# Patient Record
Sex: FEMALE | Race: WHITE | NOT HISPANIC OR LATINO | Employment: OTHER | URBAN - NONMETROPOLITAN AREA
[De-identification: names, ages, dates, MRNs, and addresses within clinical notes are randomized per-mention and may not be internally consistent; named-entity substitution may affect disease eponyms.]

---

## 2023-03-28 PROBLEM — M79.10 MYALGIA: Status: ACTIVE | Noted: 2023-03-28

## 2023-03-28 PROBLEM — M25.542 JOINT PAIN IN FINGERS OF BOTH HANDS: Status: ACTIVE | Noted: 2023-03-28

## 2023-03-28 PROBLEM — J30.9 ALLERGIC RHINITIS: Status: ACTIVE | Noted: 2023-03-28

## 2023-03-28 PROBLEM — M25.572 LEFT ANKLE PAIN: Status: ACTIVE | Noted: 2023-03-28

## 2023-03-28 PROBLEM — M81.0 OSTEOPOROSIS: Status: ACTIVE | Noted: 2023-03-28

## 2023-03-28 PROBLEM — R11.0 NAUSEA IN ADULT: Status: ACTIVE | Noted: 2023-03-28

## 2023-03-28 PROBLEM — E11.40 NEUROPATHY IN DIABETES (MULTI): Status: ACTIVE | Noted: 2023-03-28

## 2023-03-28 PROBLEM — M85.80 OSTEOPENIA: Status: ACTIVE | Noted: 2023-03-28

## 2023-03-28 PROBLEM — M19.019 ARTHRITIS OF SHOULDER: Status: ACTIVE | Noted: 2023-03-28

## 2023-03-28 PROBLEM — M79.605 PAIN OF LEFT LOWER EXTREMITY: Status: ACTIVE | Noted: 2023-03-28

## 2023-03-28 PROBLEM — M17.12 ARTHRITIS OF KNEE, LEFT: Status: ACTIVE | Noted: 2023-03-28

## 2023-03-28 PROBLEM — M54.16 LUMBAR RADICULITIS: Status: ACTIVE | Noted: 2023-03-28

## 2023-03-28 PROBLEM — E78.2 MIXED HYPERLIPIDEMIA: Status: ACTIVE | Noted: 2023-03-28

## 2023-03-28 PROBLEM — M54.50 BACK PAIN, LUMBOSACRAL: Status: ACTIVE | Noted: 2023-03-28

## 2023-03-28 PROBLEM — G56.03 BILATERAL CARPAL TUNNEL SYNDROME: Status: ACTIVE | Noted: 2023-03-28

## 2023-03-28 PROBLEM — E11.65 TYPE 2 DIABETES MELLITUS WITH HYPERGLYCEMIA, WITH LONG-TERM CURRENT USE OF INSULIN (MULTI): Status: ACTIVE | Noted: 2023-03-28

## 2023-03-28 PROBLEM — E11.9 DIABETES MELLITUS (MULTI): Status: ACTIVE | Noted: 2023-03-28

## 2023-03-28 PROBLEM — E03.9 HYPOTHYROIDISM, ADULT: Status: ACTIVE | Noted: 2023-03-28

## 2023-03-28 PROBLEM — E04.2 NONTOXIC MULTINODULAR GOITER: Status: ACTIVE | Noted: 2023-03-28

## 2023-03-28 PROBLEM — M50.20 CERVICAL DISC HERNIATION: Status: ACTIVE | Noted: 2023-03-28

## 2023-03-28 PROBLEM — R25.9 INVOLUNTARY MOVEMENTS: Status: ACTIVE | Noted: 2023-03-28

## 2023-03-28 PROBLEM — M75.51 BURSITIS OF BOTH SHOULDERS: Status: ACTIVE | Noted: 2023-03-28

## 2023-03-28 PROBLEM — M79.89 LEG SWELLING: Status: ACTIVE | Noted: 2023-03-28

## 2023-03-28 PROBLEM — M47.817 LUMBOSACRAL SPONDYLOSIS: Status: ACTIVE | Noted: 2023-03-28

## 2023-03-28 PROBLEM — R51.9 HEADACHE: Status: ACTIVE | Noted: 2023-03-28

## 2023-03-28 PROBLEM — M19.041 ARTHRITIS OF BOTH HANDS: Status: ACTIVE | Noted: 2023-03-28

## 2023-03-28 PROBLEM — F32.A DEPRESSION: Status: ACTIVE | Noted: 2023-03-28

## 2023-03-28 PROBLEM — I10 HTN (HYPERTENSION): Status: ACTIVE | Noted: 2023-03-28

## 2023-03-28 PROBLEM — M79.602 PAIN IN BOTH UPPER EXTREMITIES: Status: ACTIVE | Noted: 2023-03-28

## 2023-03-28 PROBLEM — H54.7 BLINDNESS: Status: ACTIVE | Noted: 2023-03-28

## 2023-03-28 PROBLEM — I10: Status: ACTIVE | Noted: 2023-03-28

## 2023-03-28 PROBLEM — M25.562 LEFT KNEE PAIN: Status: ACTIVE | Noted: 2023-03-28

## 2023-03-28 PROBLEM — M48.061 LUMBAR SPINAL STENOSIS: Status: ACTIVE | Noted: 2023-03-28

## 2023-03-28 PROBLEM — M50.10 CERVICAL DISC DISORDER WITH RADICULOPATHY: Status: ACTIVE | Noted: 2023-03-28

## 2023-03-28 PROBLEM — E66.812 CLASS 2 SEVERE OBESITY WITH SERIOUS COMORBIDITY AND BODY MASS INDEX (BMI) OF 36.0 TO 36.9 IN ADULT: Status: ACTIVE | Noted: 2023-03-28

## 2023-03-28 PROBLEM — F32.5 DEPRESSION, MAJOR, IN REMISSION (CMS-HCC): Status: ACTIVE | Noted: 2023-03-28

## 2023-03-28 PROBLEM — R06.83 PRIMARY SNORING: Status: ACTIVE | Noted: 2023-03-28

## 2023-03-28 PROBLEM — M25.541 JOINT PAIN IN FINGERS OF BOTH HANDS: Status: ACTIVE | Noted: 2023-03-28

## 2023-03-28 PROBLEM — H35.30 MACULAR DEGENERATION: Status: ACTIVE | Noted: 2023-03-28

## 2023-03-28 PROBLEM — M75.52 BURSITIS OF BOTH SHOULDERS: Status: ACTIVE | Noted: 2023-03-28

## 2023-03-28 PROBLEM — M79.89 RIGHT LEG SWELLING: Status: ACTIVE | Noted: 2023-03-28

## 2023-03-28 PROBLEM — E78.00 HYPERCHOLESTEROLEMIA: Status: ACTIVE | Noted: 2023-03-28

## 2023-03-28 PROBLEM — I26.99 PULMONARY EMBOLISM, BILATERAL (MULTI): Status: ACTIVE | Noted: 2023-03-28

## 2023-03-28 PROBLEM — M54.12 CERVICAL NEURITIS: Status: ACTIVE | Noted: 2023-03-28

## 2023-03-28 PROBLEM — E78.1 HYPERTRIGLYCERIDEMIA: Status: ACTIVE | Noted: 2023-03-28

## 2023-03-28 PROBLEM — M19.042 ARTHRITIS OF BOTH HANDS: Status: ACTIVE | Noted: 2023-03-28

## 2023-03-28 PROBLEM — M25.511 ARTHRALGIA OF RIGHT SHOULDER REGION: Status: ACTIVE | Noted: 2023-03-28

## 2023-03-28 PROBLEM — M54.16 RIGHT LUMBAR RADICULOPATHY: Status: ACTIVE | Noted: 2023-03-28

## 2023-03-28 PROBLEM — R07.89 ATYPICAL CHEST PAIN: Status: ACTIVE | Noted: 2023-03-28

## 2023-03-28 PROBLEM — T14.8XXA MUSCLE STRAIN: Status: ACTIVE | Noted: 2023-03-28

## 2023-03-28 PROBLEM — E16.2 HYPOGLYCEMIA: Status: ACTIVE | Noted: 2023-03-28

## 2023-03-28 PROBLEM — Z79.4 TYPE 2 DIABETES MELLITUS WITH HYPERGLYCEMIA, WITH LONG-TERM CURRENT USE OF INSULIN (MULTI): Status: ACTIVE | Noted: 2023-03-28

## 2023-03-28 PROBLEM — R07.1 CHEST PAIN VARYING WITH BREATHING: Status: ACTIVE | Noted: 2023-03-28

## 2023-03-28 PROBLEM — M25.552 LEFT HIP PAIN: Status: ACTIVE | Noted: 2023-03-28

## 2023-03-28 PROBLEM — M79.601 PAIN IN BOTH UPPER EXTREMITIES: Status: ACTIVE | Noted: 2023-03-28

## 2023-03-28 PROBLEM — R39.9 URINARY SYMPTOM OR SIGN: Status: ACTIVE | Noted: 2023-03-28

## 2023-03-28 PROBLEM — E53.8 VITAMIN B12 DEFICIENCY: Status: ACTIVE | Noted: 2023-03-28

## 2023-03-28 PROBLEM — R26.81 UNSTEADINESS: Status: ACTIVE | Noted: 2023-03-28

## 2023-03-28 PROBLEM — M43.9 WEDGE DEFORMITY ON X-RAY OF SPINE: Status: ACTIVE | Noted: 2023-03-28

## 2023-03-28 PROBLEM — H81.10 BPV (BENIGN POSITIONAL VERTIGO): Status: ACTIVE | Noted: 2023-03-28

## 2023-03-28 PROBLEM — E66.01 CLASS 2 SEVERE OBESITY WITH SERIOUS COMORBIDITY AND BODY MASS INDEX (BMI) OF 36.0 TO 36.9 IN ADULT (MULTI): Status: ACTIVE | Noted: 2023-03-28

## 2023-03-28 PROBLEM — G45.9 TIA (TRANSIENT ISCHEMIC ATTACK): Status: ACTIVE | Noted: 2023-03-28

## 2023-03-28 PROBLEM — K30 STOMACH BURNING: Status: ACTIVE | Noted: 2023-03-28

## 2023-03-28 PROBLEM — E55.9 VITAMIN D DEFICIENCY: Status: ACTIVE | Noted: 2023-03-28

## 2023-03-28 RX ORDER — FOLIC ACID 1 MG/1
1 TABLET ORAL DAILY
COMMUNITY
Start: 2015-03-31 | End: 2023-05-10 | Stop reason: SDUPTHER

## 2023-03-28 RX ORDER — GLIMEPIRIDE 2 MG/1
1 TABLET ORAL 2 TIMES DAILY
COMMUNITY
Start: 2012-07-31 | End: 2023-05-10 | Stop reason: SDUPTHER

## 2023-03-28 RX ORDER — CALCIUM CARB/VITAMIN D3/VIT K1 500-100-40
TABLET,CHEWABLE ORAL DAILY
COMMUNITY
End: 2023-05-10 | Stop reason: SDUPTHER

## 2023-03-28 RX ORDER — VIT C/E/ZN/COPPR/LUTEIN/ZEAXAN 250MG-90MG
CAPSULE ORAL
COMMUNITY
End: 2023-05-10 | Stop reason: SDUPTHER

## 2023-03-28 RX ORDER — LEVOTHYROXINE SODIUM 25 UG/1
1 TABLET ORAL DAILY
COMMUNITY
Start: 2018-02-16 | End: 2023-05-10 | Stop reason: SDUPTHER

## 2023-03-28 RX ORDER — ALENDRONATE SODIUM 70 MG/1
1 TABLET ORAL
COMMUNITY
Start: 2020-09-02 | End: 2023-05-10 | Stop reason: SDUPTHER

## 2023-03-28 RX ORDER — LISINOPRIL 20 MG/1
1 TABLET ORAL DAILY
COMMUNITY
Start: 2020-07-27 | End: 2023-05-10 | Stop reason: SDUPTHER

## 2023-03-28 RX ORDER — DULOXETIN HYDROCHLORIDE 30 MG/1
1 CAPSULE, DELAYED RELEASE ORAL DAILY
COMMUNITY
Start: 2020-02-17 | End: 2023-05-10 | Stop reason: SDUPTHER

## 2023-03-28 RX ORDER — INSULIN GLARGINE 100 [IU]/ML
30 INJECTION, SOLUTION SUBCUTANEOUS NIGHTLY
COMMUNITY
Start: 2018-05-21 | End: 2023-05-10 | Stop reason: SDUPTHER

## 2023-03-28 RX ORDER — ASPIRIN 325 MG
TABLET ORAL
COMMUNITY
Start: 2022-07-11 | End: 2023-05-10 | Stop reason: ALTCHOICE

## 2023-03-28 RX ORDER — BLOOD-GLUCOSE METER
EACH MISCELLANEOUS DAILY
COMMUNITY
Start: 2020-02-17 | End: 2023-11-16 | Stop reason: SDUPTHER

## 2023-03-28 RX ORDER — ROSUVASTATIN CALCIUM 20 MG/1
1 TABLET, COATED ORAL DAILY
COMMUNITY
Start: 2012-10-29 | End: 2023-05-10 | Stop reason: SDUPTHER

## 2023-03-28 RX ORDER — FLUTICASONE PROPIONATE 50 MCG
2 SPRAY, SUSPENSION (ML) NASAL DAILY
COMMUNITY
Start: 2017-01-05 | End: 2023-05-10 | Stop reason: SDUPTHER

## 2023-03-28 RX ORDER — AMLODIPINE BESYLATE 10 MG/1
1 TABLET ORAL DAILY
COMMUNITY
Start: 2016-02-18 | End: 2023-05-10 | Stop reason: SDUPTHER

## 2023-03-28 RX ORDER — INSULIN PUMP SYRINGE, 3 ML
1 EACH MISCELLANEOUS AS NEEDED
COMMUNITY

## 2023-03-28 RX ORDER — OMEPRAZOLE 20 MG/1
20 CAPSULE, DELAYED RELEASE ORAL DAILY
COMMUNITY
End: 2023-05-10 | Stop reason: SDUPTHER

## 2023-03-30 ENCOUNTER — APPOINTMENT (OUTPATIENT)
Dept: PRIMARY CARE | Facility: CLINIC | Age: 88
End: 2023-03-30
Payer: MEDICARE

## 2023-03-30 ENCOUNTER — OFFICE VISIT (OUTPATIENT)
Dept: PRIMARY CARE | Facility: CLINIC | Age: 88
End: 2023-03-30
Payer: MEDICARE

## 2023-03-30 VITALS
TEMPERATURE: 97.4 F | SYSTOLIC BLOOD PRESSURE: 126 MMHG | HEART RATE: 82 BPM | DIASTOLIC BLOOD PRESSURE: 64 MMHG | BODY MASS INDEX: 36.86 KG/M2 | WEIGHT: 164.4 LBS | OXYGEN SATURATION: 98 %

## 2023-03-30 DIAGNOSIS — E11.65 TYPE 2 DIABETES MELLITUS WITH HYPERGLYCEMIA, WITH LONG-TERM CURRENT USE OF INSULIN (MULTI): ICD-10-CM

## 2023-03-30 DIAGNOSIS — F32.5 DEPRESSION, MAJOR, IN REMISSION (CMS-HCC): ICD-10-CM

## 2023-03-30 DIAGNOSIS — E11.9 COMPREHENSIVE DIABETIC FOOT EXAMINATION, TYPE 2 DM, ENCOUNTER FOR (MULTI): Primary | ICD-10-CM

## 2023-03-30 DIAGNOSIS — M46.1 SACROILIITIS, NOT ELSEWHERE CLASSIFIED (CMS-HCC): ICD-10-CM

## 2023-03-30 DIAGNOSIS — E66.01 CLASS 2 SEVERE OBESITY DUE TO EXCESS CALORIES WITH SERIOUS COMORBIDITY AND BODY MASS INDEX (BMI) OF 36.0 TO 36.9 IN ADULT (MULTI): ICD-10-CM

## 2023-03-30 DIAGNOSIS — Z79.4 TYPE 2 DIABETES MELLITUS WITH HYPERGLYCEMIA, WITH LONG-TERM CURRENT USE OF INSULIN (MULTI): ICD-10-CM

## 2023-03-30 DIAGNOSIS — I26.99 PULMONARY EMBOLISM, BILATERAL (MULTI): ICD-10-CM

## 2023-03-30 PROCEDURE — 1036F TOBACCO NON-USER: CPT | Performed by: INTERNAL MEDICINE

## 2023-03-30 PROCEDURE — 99214 OFFICE O/P EST MOD 30 MIN: CPT | Performed by: INTERNAL MEDICINE

## 2023-03-30 PROCEDURE — 3078F DIAST BP <80 MM HG: CPT | Performed by: INTERNAL MEDICINE

## 2023-03-30 PROCEDURE — 1160F RVW MEDS BY RX/DR IN RCRD: CPT | Performed by: INTERNAL MEDICINE

## 2023-03-30 PROCEDURE — 3074F SYST BP LT 130 MM HG: CPT | Performed by: INTERNAL MEDICINE

## 2023-03-30 PROCEDURE — 1159F MED LIST DOCD IN RCRD: CPT | Performed by: INTERNAL MEDICINE

## 2023-03-30 ASSESSMENT — ENCOUNTER SYMPTOMS
WHEEZING: 0
DIARRHEA: 0
BRUISES/BLEEDS EASILY: 0
FATIGUE: 0
BLOOD IN STOOL: 0
DIZZINESS: 0
SORE THROAT: 0
PALPITATIONS: 0
FEVER: 0
DIFFICULTY URINATING: 0
SINUS PAIN: 0
ARTHRALGIAS: 0
ABDOMINAL PAIN: 0
COUGH: 0
UNEXPECTED WEIGHT CHANGE: 0
HEADACHES: 0

## 2023-03-30 NOTE — PROGRESS NOTES
Subjective   Patient ID: Zhanna Perez is a 89 y.o. female who presents for Diabetes (Need rx for diabetic shoes sent to Dr. Link).    Patient comes today for evaluation for diabetic foot exam  Physical exam noted for dry skin weak peripheral pulsations and deformities patient will benefit from diabetic shoes later obtain copy in the chart  - Diabetes not controlled counseled about diabetic control follow-up hemoglobin A1c next appointment continue current medication  - History of bilateral pulmonary embolism need to continue Eliquis indefinitely  - Hypertension controlled continue with current medication  -Hypercholesterolemia continue with current meds and low-fat diet  - Osteoporosis continue with current medication calcium and vitamin D  - History of macular degeneration stable  -Counseled about BMI and weight loss        Diabetes  Pertinent negatives for hypoglycemia include no dizziness or headaches. Pertinent negatives for diabetes include no chest pain and no fatigue.          Review of Systems   Constitutional:  Negative for fatigue, fever and unexpected weight change.   HENT:  Negative for congestion, ear discharge, ear pain, mouth sores, sinus pain and sore throat.    Eyes:  Negative for visual disturbance.   Respiratory:  Negative for cough and wheezing.    Cardiovascular:  Negative for chest pain, palpitations and leg swelling.   Gastrointestinal:  Negative for abdominal pain, blood in stool and diarrhea.   Genitourinary:  Negative for difficulty urinating.   Musculoskeletal:  Negative for arthralgias.   Skin:  Negative for rash.   Neurological:  Negative for dizziness and headaches.   Hematological:  Does not bruise/bleed easily.   Psychiatric/Behavioral:  Negative for behavioral problems.    All other systems reviewed and are negative.      Objective   No results found for: HGBA1C   /64   Pulse 82   Temp 36.3 °C (97.4 °F)   Wt 74.6 kg (164 lb 6.4 oz)   SpO2 98%   BMI 36.86 kg/m²      Physical Exam  Vitals and nursing note reviewed.   Constitutional:       Appearance: Normal appearance.   HENT:      Head: Normocephalic.      Nose: Nose normal.   Eyes:      Conjunctiva/sclera: Conjunctivae normal.      Pupils: Pupils are equal, round, and reactive to light.   Cardiovascular:      Rate and Rhythm: Regular rhythm.      Pulses:           Dorsalis pedis pulses are 1+ on the right side and 1+ on the left side.        Posterior tibial pulses are 1+ on the right side and 1+ on the left side.   Pulmonary:      Effort: Pulmonary effort is normal.      Breath sounds: Normal breath sounds.   Abdominal:      General: Abdomen is flat.      Palpations: Abdomen is soft.   Musculoskeletal:      Cervical back: Neck supple.      Right foot: Decreased range of motion. Deformity and bunion present.      Left foot: Decreased range of motion. Deformity and bunion present.   Feet:      Right foot:      Protective Sensation: 5 sites tested.  5 sites sensed.      Skin integrity: Dry skin present.      Toenail Condition: Right toenails are abnormally thick and ingrown.      Left foot:      Protective Sensation: 5 sites tested.  5 sites sensed.      Skin integrity: Dry skin present.      Toenail Condition: Left toenails are abnormally thick.   Skin:     General: Skin is warm.   Neurological:      General: No focal deficit present.      Mental Status: She is oriented to person, place, and time.   Psychiatric:         Mood and Affect: Mood normal.         Assessment/Plan   Zhanna was seen today for diabetes.  Diagnoses and all orders for this visit:  Comprehensive diabetic foot examination, type 2 DM, encounter for (CMS/[facility]) (Primary)  Sacroiliitis, not elsewhere classified (CMS/[facility])  Pulmonary embolism, bilateral (CMS/[facility])  Depression, major, in remission (CMS/[facility])  Type 2 diabetes mellitus with hyperglycemia, with long-term current use of insulin (CMS/[facility])  Class 2 severe obesity due to excess calories with serious  comorbidity and body mass index (BMI) of 36.0 to 36.9 in adult (CMS/[facility])   Patient comes today for evaluation for diabetic foot exam  Physical exam noted for dry skin weak peripheral pulsations and deformities patient will benefit from diabetic shoes later obtain copy in the chart  - Diabetes not controlled counseled about diabetic control follow-up hemoglobin A1c next appointment continue current medication  - History of bilateral pulmonary embolism need to continue Eliquis indefinitely  - Hypertension controlled continue with current medication  -Hypercholesterolemia continue with current meds and low-fat diet  - Osteoporosis continue with current medication calcium and vitamin D  - History of macular degeneration stable  -Counseled about BMI and weight loss

## 2023-05-10 ENCOUNTER — OFFICE VISIT (OUTPATIENT)
Dept: PRIMARY CARE | Facility: CLINIC | Age: 88
End: 2023-05-10
Payer: MEDICARE

## 2023-05-10 VITALS
BODY MASS INDEX: 38.29 KG/M2 | DIASTOLIC BLOOD PRESSURE: 72 MMHG | SYSTOLIC BLOOD PRESSURE: 138 MMHG | WEIGHT: 170.8 LBS | HEART RATE: 73 BPM | TEMPERATURE: 97.6 F | OXYGEN SATURATION: 99 %

## 2023-05-10 DIAGNOSIS — E55.9 VITAMIN D DEFICIENCY: ICD-10-CM

## 2023-05-10 DIAGNOSIS — F32.A DEPRESSION, UNSPECIFIED DEPRESSION TYPE: ICD-10-CM

## 2023-05-10 DIAGNOSIS — I10 PRIMARY HYPERTENSION: ICD-10-CM

## 2023-05-10 DIAGNOSIS — L03.119 CELLULITIS OF FOOT: ICD-10-CM

## 2023-05-10 DIAGNOSIS — I26.99 PULMONARY EMBOLISM, BILATERAL (MULTI): ICD-10-CM

## 2023-05-10 DIAGNOSIS — E03.9 HYPOTHYROIDISM, ADULT: ICD-10-CM

## 2023-05-10 DIAGNOSIS — Z79.4 TYPE 2 DIABETES MELLITUS WITH HYPERGLYCEMIA, WITH LONG-TERM CURRENT USE OF INSULIN (MULTI): Primary | ICD-10-CM

## 2023-05-10 DIAGNOSIS — M81.0 OSTEOPOROSIS, UNSPECIFIED OSTEOPOROSIS TYPE, UNSPECIFIED PATHOLOGICAL FRACTURE PRESENCE: ICD-10-CM

## 2023-05-10 DIAGNOSIS — E78.00 HYPERCHOLESTEROLEMIA: ICD-10-CM

## 2023-05-10 DIAGNOSIS — E11.65 TYPE 2 DIABETES MELLITUS WITH HYPERGLYCEMIA, WITH LONG-TERM CURRENT USE OF INSULIN (MULTI): Primary | ICD-10-CM

## 2023-05-10 DIAGNOSIS — E66.01 CLASS 2 SEVERE OBESITY WITH SERIOUS COMORBIDITY AND BODY MASS INDEX (BMI) OF 38.0 TO 38.9 IN ADULT, UNSPECIFIED OBESITY TYPE (MULTI): ICD-10-CM

## 2023-05-10 DIAGNOSIS — K21.9 GASTROESOPHAGEAL REFLUX DISEASE, UNSPECIFIED WHETHER ESOPHAGITIS PRESENT: ICD-10-CM

## 2023-05-10 DIAGNOSIS — T78.40XD ALLERGY, SUBSEQUENT ENCOUNTER: ICD-10-CM

## 2023-05-10 PROCEDURE — 99214 OFFICE O/P EST MOD 30 MIN: CPT | Performed by: NURSE PRACTITIONER

## 2023-05-10 PROCEDURE — 1036F TOBACCO NON-USER: CPT | Performed by: NURSE PRACTITIONER

## 2023-05-10 PROCEDURE — 3075F SYST BP GE 130 - 139MM HG: CPT | Performed by: NURSE PRACTITIONER

## 2023-05-10 PROCEDURE — 1159F MED LIST DOCD IN RCRD: CPT | Performed by: NURSE PRACTITIONER

## 2023-05-10 PROCEDURE — 3078F DIAST BP <80 MM HG: CPT | Performed by: NURSE PRACTITIONER

## 2023-05-10 PROCEDURE — 1160F RVW MEDS BY RX/DR IN RCRD: CPT | Performed by: NURSE PRACTITIONER

## 2023-05-10 RX ORDER — CALCIUM CARB/VITAMIN D3/VIT K1 500-100-40
1 TABLET,CHEWABLE ORAL DAILY
Qty: 90 EACH | Refills: 0 | Status: SHIPPED | OUTPATIENT
Start: 2023-05-10 | End: 2023-11-16 | Stop reason: WASHOUT

## 2023-05-10 RX ORDER — ALENDRONATE SODIUM 70 MG/1
70 TABLET ORAL
Qty: 12 TABLET | Refills: 0 | Status: SHIPPED | OUTPATIENT
Start: 2023-05-10 | End: 2023-08-10 | Stop reason: SDUPTHER

## 2023-05-10 RX ORDER — LISINOPRIL 20 MG/1
20 TABLET ORAL DAILY
Qty: 90 TABLET | Refills: 0 | Status: SHIPPED | OUTPATIENT
Start: 2023-05-10 | End: 2023-07-27 | Stop reason: SDUPTHER

## 2023-05-10 RX ORDER — FOLIC ACID 1 MG/1
1 TABLET ORAL DAILY
Qty: 90 TABLET | Refills: 0 | Status: SHIPPED | OUTPATIENT
Start: 2023-05-10 | End: 2023-08-10 | Stop reason: SDUPTHER

## 2023-05-10 RX ORDER — GLIMEPIRIDE 2 MG/1
2 TABLET ORAL 2 TIMES DAILY
Qty: 180 TABLET | Refills: 0 | Status: SHIPPED | OUTPATIENT
Start: 2023-05-10 | End: 2023-08-10 | Stop reason: SDUPTHER

## 2023-05-10 RX ORDER — OMEPRAZOLE 20 MG/1
20 CAPSULE, DELAYED RELEASE ORAL
Qty: 90 CAPSULE | Refills: 0 | Status: SHIPPED | OUTPATIENT
Start: 2023-05-10 | End: 2023-08-10 | Stop reason: SDUPTHER

## 2023-05-10 RX ORDER — AMLODIPINE BESYLATE 10 MG/1
10 TABLET ORAL DAILY
Qty: 90 TABLET | Refills: 0 | Status: SHIPPED | OUTPATIENT
Start: 2023-05-10 | End: 2023-06-30 | Stop reason: SDUPTHER

## 2023-05-10 RX ORDER — INSULIN GLARGINE 100 [IU]/ML
30 INJECTION, SOLUTION SUBCUTANEOUS NIGHTLY
Qty: 30 ML | Refills: 0 | Status: SHIPPED | OUTPATIENT
Start: 2023-05-10 | End: 2023-08-10 | Stop reason: SDUPTHER

## 2023-05-10 RX ORDER — LEVOTHYROXINE SODIUM 25 UG/1
25 TABLET ORAL DAILY
Qty: 90 TABLET | Refills: 0 | Status: SHIPPED | OUTPATIENT
Start: 2023-05-10 | End: 2023-08-10 | Stop reason: SDUPTHER

## 2023-05-10 RX ORDER — DULOXETIN HYDROCHLORIDE 30 MG/1
30 CAPSULE, DELAYED RELEASE ORAL DAILY
Qty: 90 CAPSULE | Refills: 0 | Status: SHIPPED | OUTPATIENT
Start: 2023-05-10 | End: 2023-08-10 | Stop reason: SDUPTHER

## 2023-05-10 RX ORDER — ROSUVASTATIN CALCIUM 20 MG/1
20 TABLET, COATED ORAL DAILY
Qty: 90 TABLET | Refills: 0 | Status: SHIPPED | OUTPATIENT
Start: 2023-05-10 | End: 2023-08-10 | Stop reason: SDUPTHER

## 2023-05-10 RX ORDER — DOXYCYCLINE 100 MG/1
100 TABLET ORAL 2 TIMES DAILY
Qty: 20 TABLET | Refills: 0 | Status: SHIPPED | OUTPATIENT
Start: 2023-05-10 | End: 2023-05-20

## 2023-05-10 RX ORDER — FLUTICASONE PROPIONATE 50 MCG
2 SPRAY, SUSPENSION (ML) NASAL DAILY
Qty: 16 G | Refills: 0 | Status: SHIPPED | OUTPATIENT
Start: 2023-05-10 | End: 2023-05-16

## 2023-05-10 RX ORDER — VIT C/E/ZN/COPPR/LUTEIN/ZEAXAN 250MG-90MG
25 CAPSULE ORAL DAILY
Qty: 90 CAPSULE | Refills: 0 | Status: SHIPPED | OUTPATIENT
Start: 2023-05-10 | End: 2023-11-16 | Stop reason: SDUPTHER

## 2023-05-10 ASSESSMENT — ENCOUNTER SYMPTOMS
PSYCHIATRIC NEGATIVE: 1
HEMATOLOGIC/LYMPHATIC NEGATIVE: 1
HEADACHES: 0
FATIGUE: 0
WOUND: 1
NAUSEA: 0
ENDOCRINE NEGATIVE: 1
WHEEZING: 0
COUGH: 0
CHEST TIGHTNESS: 0
CONSTIPATION: 0
SHORTNESS OF BREATH: 0
ABDOMINAL DISTENTION: 0
NUMBNESS: 0
ARTHRALGIAS: 1
VOMITING: 0
CHILLS: 0
DIZZINESS: 0
LIGHT-HEADEDNESS: 0
RHINORRHEA: 0
WEAKNESS: 0
DIARRHEA: 0
ACTIVITY CHANGE: 0
SORE THROAT: 0
ALLERGIC/IMMUNOLOGIC NEGATIVE: 1
PALPITATIONS: 0
FEVER: 0
SINUS PRESSURE: 0
ABDOMINAL PAIN: 0
SINUS PAIN: 0

## 2023-05-10 NOTE — ASSESSMENT & PLAN NOTE
-Avoid sweets and sugary drinks  -Drink plenty of water  -Avoid processed foods and refined foods  -Eat fruits, vegetables, lean protein, whole grains  -Increase your activity level

## 2023-05-10 NOTE — ASSESSMENT & PLAN NOTE
Continue current meds  -Low flat/cholesterol, low sodium, carbohydrate controlled diet  -Exercise as tolerated 150 minutes/week, gradually increase activity  -Weight loss  -Regular follow-up with ophthalmology and podiatry

## 2023-05-10 NOTE — PROGRESS NOTES
Subjective   Patient ID: Zhanna Perez is a 89 y.o. female who presents for Hypertension, Hyperlipidemia, Diabetes, and Foot Injury (Left foot pain, dropped boiling water 1 week ago, second toe severe pain ).    Burned dorsal left foot 1-1/2 weeks ago with boiling water. Had blister. Blister is healing but there is some surrounding cellulitis. Start doxycycline. Upcoming appointment with podiatry, Dr. Do.  Diabetes, not controlled, improving A1c 8.3%, taking Lantus to 30 units at bedtime, glimepiride 4 mg twice a day. Macular degeneration, she is having trouble seeing to prick her finger and check her blood sugar. She also has bilateral hand arthritis which makes it difficult for her to manipulate the lancets, meter, and test strips. Needs CGM with a large display in order for her to check her sugar daily without pricking her finger. She occasionally has low blood sugars and needs to be able to check at that time. Continue to check blood sugar daily and bring record to next visit.   GERD, improved with omeprazole 20 mg every morning.   Bilateral pulmonary emboli in August 2021. Patient saw hematology, further testing for coagulopathy was done. Hematology does not believe she has thrombophilia. Believes PE was provoked related to spinal stenosis and decreased mobility. Recommends staying on Eliquis long-term due to her risk of recurrent immobility. Hematology cleared her for spinal injections with holding Eliquis for 3 days prior to the procedure and resuming the day after.   Lumbar spondylosis, lumbar radiculitis. Was following with pain management, Dr. Banks. Has not seen a new provider since he left.  Hypertension, controlled, continue amlodipine 10 mg daily, lisinopril 20 mg daily.  Osteoporosis, taking alendronate 70 mg weekly. Taking Os-João daily. Vitamin D deficiency taking vitamin D 25 mcg daily.  Dyslipidemia, taking rosuvastatin 20 mg daily.  Levothyroxine, controlled, taking levothyroxine 25 mcg  daily.  Taking Cymbalta 30 mg daily for arthritis and depression, controlled    Preventive:  CRC screen: Aged out  Mammogram: 10/2022 negative  DEXA scan: 11/2022 osteoporosis   Ophthalmology: [facility]  Podiatry: [facility]  Urine albumin: 10/2022             Review of Systems   Constitutional:  Negative for activity change, chills, fatigue and fever.   HENT:  Negative for congestion, rhinorrhea, sinus pressure, sinus pain and sore throat.    Eyes:  Positive for visual disturbance.   Respiratory:  Negative for cough, chest tightness, shortness of breath and wheezing.    Cardiovascular:  Negative for chest pain, palpitations and leg swelling.   Gastrointestinal:  Negative for abdominal distention, abdominal pain, constipation, diarrhea, nausea and vomiting.   Endocrine: Negative.    Genitourinary: Negative.    Musculoskeletal:  Positive for arthralgias.   Skin:  Positive for wound.   Allergic/Immunologic: Negative.    Neurological:  Negative for dizziness, syncope, weakness, light-headedness, numbness and headaches.   Hematological: Negative.    Psychiatric/Behavioral: Negative.     All other systems reviewed and are negative.      Objective   /72   Pulse 73   Temp 36.4 °C (97.6 °F)   Wt 77.5 kg (170 lb 12.8 oz)   SpO2 99%   BMI 38.29 kg/m²     Physical Exam  Vitals and nursing note reviewed.   Constitutional:       General: She is not in acute distress.     Appearance: Normal appearance. She is obese. She is not ill-appearing.   HENT:      Head: Normocephalic and atraumatic.      Right Ear: Tympanic membrane, ear canal and external ear normal.      Left Ear: Tympanic membrane, ear canal and external ear normal.      Nose: Nose normal.      Mouth/Throat:      Mouth: Mucous membranes are moist.      Pharynx: Oropharynx is clear.   Eyes:      Pupils: Pupils are equal, round, and reactive to light.   Cardiovascular:      Rate and Rhythm: Normal rate and regular rhythm.      Pulses: Normal pulses.      Heart sounds:  Normal heart sounds. No murmur heard.  Pulmonary:      Effort: Pulmonary effort is normal. No respiratory distress.      Breath sounds: Normal breath sounds. No wheezing.   Abdominal:      General: Bowel sounds are normal. There is no distension.      Palpations: Abdomen is soft.      Tenderness: There is no abdominal tenderness.   Musculoskeletal:         General: No tenderness. Normal range of motion.      Cervical back: Normal range of motion and neck supple.      Right lower leg: No edema.      Left lower leg: No edema.      Comments: Uses walker   Skin:     General: Skin is warm and dry.      Capillary Refill: Capillary refill takes less than 2 seconds.      Coloration: Skin is not jaundiced.      Comments: Scabbed area dorsal L foot, healing blister, surrounding mild cellulitis   Neurological:      General: No focal deficit present.      Mental Status: She is alert and oriented to person, place, and time.      Motor: No weakness.   Psychiatric:         Mood and Affect: Mood normal.         Behavior: Behavior normal.         Thought Content: Thought content normal.         Judgment: Judgment normal.         Assessment/Plan   Problem List Items Addressed This Visit          Circulatory    HTN (hypertension)     Controlled. Continue current medications.  -Low fat/cholesterol, low sodium, low carbohydrate diet  -Exercise as tolerated 150 minutes/week, increase activity slowly  -Weight loss         Relevant Medications    amLODIPine (Norvasc) 10 mg tablet    lisinopril 20 mg tablet    Pulmonary embolism, bilateral (CMS/HCC)     Continue Eliquis indefinitely         Relevant Medications    apixaban (Eliquis) 5 mg tablet    Other Relevant Orders    Follow Up In Primary Care       Musculoskeletal    Osteoporosis     Controlled. Continue current medications.  Monitor bone density         Relevant Medications    alendronate (Fosamax) 70 mg tablet    Other Relevant Orders    Follow Up In Primary Care        "Endocrine/Metabolic    Hypothyroidism, adult     Controlled. Continue current medications.  Monitor TSH         Relevant Medications    levothyroxine (Synthroid, Levoxyl) 25 mcg tablet    Vitamin D deficiency    Relevant Medications    cholecalciferol (Vitamin D-3) 25 MCG (1000 UT) capsule    Class 2 severe obesity with serious comorbidity and body mass index (BMI) of 38.0 to 38.9 in adult (CMS/[facility])     -Avoid sweets and sugary drinks  -Drink plenty of water  -Avoid processed foods and refined foods  -Eat fruits, vegetables, lean protein, whole grains  -Increase your activity level         Type 2 diabetes mellitus with hyperglycemia, with long-term current use of insulin (CMS/[facility]) - Primary     Continue current meds  -Low flat/cholesterol, low sodium, carbohydrate controlled diet  -Exercise as tolerated 150 minutes/week, gradually increase activity  -Weight loss  -Regular follow-up with ophthalmology and podiatry         Relevant Medications    glimepiride (Amaryl) 2 mg tablet    insulin glargine (Lantus) 100 unit/mL (3 mL) pen    insulin syringe-needle U-100 31G X 5/16\" 0.3 mL syringe    Other Relevant Orders    Follow Up In Primary Care       Other    Depression     Controlled. Continue current medications.         Relevant Medications    DULoxetine (Cymbalta) 30 mg DR capsule    Hypercholesterolemia     Controlled. Continue current medications.  -Low fat/low cholesterol diet  -Eat more fresh foods  -Avoid processed/prepackaged/fast foods  -Gradually increase physical activity  -Weight loss         Relevant Medications    rosuvastatin (Crestor) 20 mg tablet     Other Visit Diagnoses       Gastroesophageal reflux disease, unspecified whether esophagitis present        Relevant Medications    omeprazole (PriLOSEC) 20 mg DR capsule    Allergy, subsequent encounter        Relevant Medications    fluticasone (Flonase) 50 mcg/actuation nasal spray    folic acid (Folvite) 1 mg tablet    Cellulitis of foot        Relevant " Medications    doxycycline (Adoxa) 100 mg tablet

## 2023-05-16 DIAGNOSIS — T78.40XD ALLERGY, SUBSEQUENT ENCOUNTER: ICD-10-CM

## 2023-05-16 RX ORDER — FLUTICASONE PROPIONATE 50 MCG
SPRAY, SUSPENSION (ML) NASAL
Qty: 48 ML | Refills: 1 | Status: SHIPPED | OUTPATIENT
Start: 2023-05-16 | End: 2023-12-15 | Stop reason: SDUPTHER

## 2023-05-19 LAB
AMPHETAMINE (PRESENCE) IN URINE BY SCREEN METHOD: NORMAL
BARBITURATES PRESENCE IN URINE BY SCREEN METHOD: NORMAL
BENZODIAZEPINE (PRESENCE) IN URINE BY SCREEN METHOD: NORMAL
CANNABINOIDS IN URINE BY SCREEN METHOD: NORMAL
COCAINE (PRESENCE) IN URINE BY SCREEN METHOD: NORMAL
DRUG SCREEN COMMENT URINE: NORMAL
FENTANYL URINE: NORMAL
METHADONE (PRESENCE) IN URINE BY SCREEN METHOD: NORMAL
OPIATES (PRESENCE) IN URINE BY SCREEN METHOD: NORMAL
OXYCODONE (PRESENCE) IN URINE BY SCREEN METHOD: NORMAL
PHENCYCLIDINE (PRESENCE) IN URINE BY SCREEN METHOD: NORMAL

## 2023-05-22 ENCOUNTER — OFFICE VISIT (OUTPATIENT)
Dept: PRIMARY CARE | Facility: CLINIC | Age: 88
End: 2023-05-22
Payer: MEDICARE

## 2023-05-22 VITALS
HEART RATE: 85 BPM | BODY MASS INDEX: 37.7 KG/M2 | SYSTOLIC BLOOD PRESSURE: 130 MMHG | DIASTOLIC BLOOD PRESSURE: 64 MMHG | TEMPERATURE: 97 F | HEIGHT: 56 IN | OXYGEN SATURATION: 96 % | WEIGHT: 167.6 LBS

## 2023-05-22 DIAGNOSIS — I10 PRIMARY HYPERTENSION: ICD-10-CM

## 2023-05-22 DIAGNOSIS — M46.1 SACROILIITIS, NOT ELSEWHERE CLASSIFIED (CMS-HCC): ICD-10-CM

## 2023-05-22 DIAGNOSIS — I26.99 PULMONARY EMBOLISM, BILATERAL (MULTI): ICD-10-CM

## 2023-05-22 DIAGNOSIS — M79.89 LEG SWELLING: Primary | ICD-10-CM

## 2023-05-22 PROCEDURE — 1159F MED LIST DOCD IN RCRD: CPT | Performed by: NURSE PRACTITIONER

## 2023-05-22 PROCEDURE — 1036F TOBACCO NON-USER: CPT | Performed by: NURSE PRACTITIONER

## 2023-05-22 PROCEDURE — 3078F DIAST BP <80 MM HG: CPT | Performed by: NURSE PRACTITIONER

## 2023-05-22 PROCEDURE — 1160F RVW MEDS BY RX/DR IN RCRD: CPT | Performed by: NURSE PRACTITIONER

## 2023-05-22 PROCEDURE — 3075F SYST BP GE 130 - 139MM HG: CPT | Performed by: NURSE PRACTITIONER

## 2023-05-22 PROCEDURE — 99214 OFFICE O/P EST MOD 30 MIN: CPT | Performed by: NURSE PRACTITIONER

## 2023-05-22 RX ORDER — FUROSEMIDE 20 MG/1
20 TABLET ORAL DAILY
Qty: 30 TABLET | Refills: 2 | Status: SHIPPED | OUTPATIENT
Start: 2023-05-22 | End: 2023-06-30 | Stop reason: SDUPTHER

## 2023-05-22 ASSESSMENT — PATIENT HEALTH QUESTIONNAIRE - PHQ9
1. LITTLE INTEREST OR PLEASURE IN DOING THINGS: NOT AT ALL
SUM OF ALL RESPONSES TO PHQ9 QUESTIONS 1 AND 2: 0
2. FEELING DOWN, DEPRESSED OR HOPELESS: NOT AT ALL

## 2023-05-22 ASSESSMENT — ENCOUNTER SYMPTOMS
DEPRESSION: 0
LOSS OF SENSATION IN FEET: 0
OCCASIONAL FEELINGS OF UNSTEADINESS: 0

## 2023-05-22 NOTE — ASSESSMENT & PLAN NOTE
Low-sodium diet  Elevate legs several times a day  Compression stockings  Instructed to call the office if symptoms worsen or do not improve.

## 2023-05-22 NOTE — PROGRESS NOTES
Subjective   Patient ID: Zhanna Perez is a 89 y.o. female who presents for Leg Swelling (X2 months/Pain in back/Needs a letter so she can stop her eliquis before pain shot with Dr Amezquita).    Bilateral lower leg edema worsening. Start furosemide 20 mg daily. Check BMP next week. If resolves, may cut back to as needed. Educated regarding low-sodium diet and compression stockings.  Upcoming appointment with podiatry, Dr. Do.  Diabetes, not controlled, improving A1c 8.3%, taking Lantus to 30 units at bedtime, glimepiride 4 mg twice a day. Macular degeneration, she is having trouble seeing to prick her finger and check her blood sugar. She also has bilateral hand arthritis which makes it difficult for her to manipulate the lancets, meter, and test strips. Needs CGM with a large display in order for her to check her sugar daily without pricking her finger. She occasionally has low blood sugars and needs to be able to check at that time. Continue to check blood sugar daily and bring record to next visit.   GERD, improved with omeprazole 20 mg every morning.   Bilateral pulmonary emboli in August 2021. Patient saw hematology, further testing for coagulopathy was done. Hematology does not believe she has thrombophilia. Believes PE was provoked related to spinal stenosis and decreased mobility. Recommends staying on Eliquis long-term due to her risk of recurrent immobility. Hematology cleared her for spinal injections with holding Eliquis for 3 days prior to the procedure and resuming the day after.   Lumbar spondylosis, lumbar radiculitis. Seeing new pain management, saw Rosaura Ferro CNP recently  Hypertension, controlled, continue amlodipine 10 mg daily, lisinopril 20 mg daily.  Osteoporosis, taking alendronate 70 mg weekly. Taking Os-João daily. Vitamin D deficiency taking vitamin D 25 mcg daily.  Dyslipidemia, taking rosuvastatin 20 mg daily.  Levothyroxine, controlled, taking levothyroxine 25 mcg daily.  Taking  "Cymbalta 30 mg daily for arthritis and depression, controlled    Preventive:  CRC screen: Aged out  Mammogram: 10/2022 negative  DEXA scan: 11/2022 osteoporosis   Ophthalmology: [facility]  Podiatry: [facility]  Urine albumin: 10/2022             Review of Systems   Constitutional:  Negative for activity change, chills, fatigue and fever.   HENT:  Negative for congestion, rhinorrhea, sinus pressure, sinus pain and sore throat.    Eyes:  Positive for visual disturbance.   Respiratory:  Negative for cough, chest tightness, shortness of breath and wheezing.    Cardiovascular:  Positive for leg swelling. Negative for chest pain and palpitations.   Gastrointestinal:  Negative for abdominal distention, abdominal pain, constipation, diarrhea, nausea and vomiting.   Endocrine: Negative.    Genitourinary: Negative.    Musculoskeletal:  Positive for arthralgias.   Allergic/Immunologic: Negative.    Neurological:  Negative for dizziness, syncope, weakness, light-headedness, numbness and headaches.   Hematological: Negative.    Psychiatric/Behavioral: Negative.     All other systems reviewed and are negative.      Objective   /64   Pulse 85   Temp 36.1 °C (97 °F)   Ht 1.422 m (4' 8\")   Wt 76 kg (167 lb 9.6 oz)   SpO2 96%   BMI 37.58 kg/m²     Physical Exam  Vitals and nursing note reviewed.   Constitutional:       General: She is not in acute distress.     Appearance: Normal appearance. She is obese. She is not ill-appearing.   HENT:      Head: Normocephalic and atraumatic.      Right Ear: Tympanic membrane, ear canal and external ear normal.      Left Ear: Tympanic membrane, ear canal and external ear normal.      Nose: Nose normal.      Mouth/Throat:      Mouth: Mucous membranes are moist.      Pharynx: Oropharynx is clear.   Eyes:      Pupils: Pupils are equal, round, and reactive to light.   Cardiovascular:      Rate and Rhythm: Normal rate and regular rhythm.      Pulses: Normal pulses.      Heart sounds: Normal heart " sounds. No murmur heard.  Pulmonary:      Effort: Pulmonary effort is normal. No respiratory distress.      Breath sounds: Normal breath sounds. No wheezing.   Abdominal:      General: Bowel sounds are normal. There is no distension.      Palpations: Abdomen is soft.      Tenderness: There is no abdominal tenderness.   Musculoskeletal:         General: No tenderness. Normal range of motion.      Cervical back: Normal range of motion and neck supple.      Right lower le+ Edema present.      Left lower le+ Edema present.      Comments: Uses walker   Skin:     General: Skin is warm and dry.      Capillary Refill: Capillary refill takes less than 2 seconds.      Coloration: Skin is not jaundiced.   Neurological:      General: No focal deficit present.      Mental Status: She is alert and oriented to person, place, and time.      Motor: No weakness.   Psychiatric:         Mood and Affect: Mood normal.         Behavior: Behavior normal.         Thought Content: Thought content normal.         Judgment: Judgment normal.         Assessment/Plan   Problem List Items Addressed This Visit          Circulatory    HTN (hypertension)     Controlled. Continue current medications.  -Low fat/cholesterol, low sodium, low carbohydrate diet  -Exercise as tolerated 150 minutes/week, increase activity slowly  -Weight loss         Pulmonary embolism, bilateral (CMS/HCC)     Controlled. Continue current medications.            Musculoskeletal    Leg swelling - Primary     Low-sodium diet  Elevate legs several times a day  Compression stockings  Instructed to call the office if symptoms worsen or do not improve.         Relevant Medications    furosemide (Lasix) 20 mg tablet    Other Relevant Orders    Basic Metabolic Panel    Sacroiliitis, not elsewhere classified (CMS/HCC)     Follow-up with pain management  Patient may hold Eliquis for 3 days prior to injections/procedures

## 2023-05-23 ASSESSMENT — ENCOUNTER SYMPTOMS
DIARRHEA: 0
VOMITING: 0
HEMATOLOGIC/LYMPHATIC NEGATIVE: 1
NAUSEA: 0
COUGH: 0
CHEST TIGHTNESS: 0
ACTIVITY CHANGE: 0
WEAKNESS: 0
ARTHRALGIAS: 1
SORE THROAT: 0
CHILLS: 0
ALLERGIC/IMMUNOLOGIC NEGATIVE: 1
SHORTNESS OF BREATH: 0
PSYCHIATRIC NEGATIVE: 1
ABDOMINAL PAIN: 0
DIZZINESS: 0
HEADACHES: 0
RHINORRHEA: 0
NUMBNESS: 0
FEVER: 0
ENDOCRINE NEGATIVE: 1
LIGHT-HEADEDNESS: 0
FATIGUE: 0
CONSTIPATION: 0
ABDOMINAL DISTENTION: 0
WHEEZING: 0
PALPITATIONS: 0
SINUS PAIN: 0
SINUS PRESSURE: 0

## 2023-05-23 NOTE — ASSESSMENT & PLAN NOTE
Follow-up with pain management  Patient may hold Eliquis for 3 days prior to injections/procedures

## 2023-05-31 ENCOUNTER — LAB (OUTPATIENT)
Dept: LAB | Facility: LAB | Age: 88
End: 2023-05-31
Payer: MEDICARE

## 2023-05-31 DIAGNOSIS — M79.89 LEG SWELLING: ICD-10-CM

## 2023-05-31 LAB
ANION GAP IN SER/PLAS: 14 MMOL/L (ref 10–20)
CALCIUM (MG/DL) IN SER/PLAS: 9.4 MG/DL (ref 8.6–10.3)
CARBON DIOXIDE, TOTAL (MMOL/L) IN SER/PLAS: 26 MMOL/L (ref 21–32)
CHLORIDE (MMOL/L) IN SER/PLAS: 102 MMOL/L (ref 98–107)
CREATININE (MG/DL) IN SER/PLAS: 0.98 MG/DL (ref 0.5–1.05)
GFR FEMALE: 55 ML/MIN/1.73M2
GLUCOSE (MG/DL) IN SER/PLAS: 217 MG/DL (ref 74–99)
POTASSIUM (MMOL/L) IN SER/PLAS: 4.3 MMOL/L (ref 3.5–5.3)
SODIUM (MMOL/L) IN SER/PLAS: 138 MMOL/L (ref 136–145)
UREA NITROGEN (MG/DL) IN SER/PLAS: 14 MG/DL (ref 6–23)

## 2023-05-31 PROCEDURE — 36415 COLL VENOUS BLD VENIPUNCTURE: CPT

## 2023-05-31 PROCEDURE — 80048 BASIC METABOLIC PNL TOTAL CA: CPT

## 2023-06-05 DIAGNOSIS — I26.99 PULMONARY EMBOLISM, BILATERAL (MULTI): ICD-10-CM

## 2023-06-05 RX ORDER — APIXABAN 5 MG/1
TABLET, FILM COATED ORAL
Qty: 180 TABLET | Refills: 0 | Status: SHIPPED | OUTPATIENT
Start: 2023-06-05 | End: 2023-06-30 | Stop reason: SDUPTHER

## 2023-06-30 ENCOUNTER — OFFICE VISIT (OUTPATIENT)
Dept: PRIMARY CARE | Facility: CLINIC | Age: 88
End: 2023-06-30
Payer: MEDICARE

## 2023-06-30 VITALS
BODY MASS INDEX: 37.69 KG/M2 | TEMPERATURE: 97.2 F | SYSTOLIC BLOOD PRESSURE: 134 MMHG | WEIGHT: 168.1 LBS | DIASTOLIC BLOOD PRESSURE: 60 MMHG | HEART RATE: 80 BPM | OXYGEN SATURATION: 97 %

## 2023-06-30 DIAGNOSIS — I26.99 PULMONARY EMBOLISM, BILATERAL (MULTI): ICD-10-CM

## 2023-06-30 DIAGNOSIS — M79.89 LEG SWELLING: Primary | ICD-10-CM

## 2023-06-30 DIAGNOSIS — I10 PRIMARY HYPERTENSION: ICD-10-CM

## 2023-06-30 PROCEDURE — 1159F MED LIST DOCD IN RCRD: CPT | Performed by: NURSE PRACTITIONER

## 2023-06-30 PROCEDURE — 99214 OFFICE O/P EST MOD 30 MIN: CPT | Performed by: NURSE PRACTITIONER

## 2023-06-30 PROCEDURE — 1036F TOBACCO NON-USER: CPT | Performed by: NURSE PRACTITIONER

## 2023-06-30 PROCEDURE — 3078F DIAST BP <80 MM HG: CPT | Performed by: NURSE PRACTITIONER

## 2023-06-30 PROCEDURE — 3075F SYST BP GE 130 - 139MM HG: CPT | Performed by: NURSE PRACTITIONER

## 2023-06-30 PROCEDURE — 1160F RVW MEDS BY RX/DR IN RCRD: CPT | Performed by: NURSE PRACTITIONER

## 2023-06-30 RX ORDER — ACETAMINOPHEN 500 MG
TABLET ORAL
Qty: 1 KIT | Refills: 0 | Status: SHIPPED | OUTPATIENT
Start: 2023-06-30

## 2023-06-30 RX ORDER — AMLODIPINE BESYLATE 5 MG/1
5 TABLET ORAL DAILY
Qty: 90 TABLET | Refills: 0 | Status: SHIPPED | OUTPATIENT
Start: 2023-06-30 | End: 2023-07-27 | Stop reason: ALTCHOICE

## 2023-06-30 RX ORDER — FUROSEMIDE 20 MG/1
TABLET ORAL
Qty: 180 TABLET | Refills: 0 | Status: SHIPPED | OUTPATIENT
Start: 2023-06-30 | End: 2023-08-10 | Stop reason: SDUPTHER

## 2023-06-30 ASSESSMENT — ENCOUNTER SYMPTOMS
WHEEZING: 0
HEMATOLOGIC/LYMPHATIC NEGATIVE: 1
VOMITING: 0
SINUS PRESSURE: 0
ABDOMINAL PAIN: 0
WEAKNESS: 0
NAUSEA: 0
ALLERGIC/IMMUNOLOGIC NEGATIVE: 1
SORE THROAT: 0
COUGH: 0
ACTIVITY CHANGE: 0
ENDOCRINE NEGATIVE: 1
ARTHRALGIAS: 1
FATIGUE: 0
HEADACHES: 0
PALPITATIONS: 0
LIGHT-HEADEDNESS: 0
CHILLS: 0
SHORTNESS OF BREATH: 0
CONSTIPATION: 0
CHEST TIGHTNESS: 0
ABDOMINAL DISTENTION: 0
PSYCHIATRIC NEGATIVE: 1
FEVER: 0
NUMBNESS: 0
DIZZINESS: 0
DIARRHEA: 0
RHINORRHEA: 0
SINUS PAIN: 0

## 2023-06-30 NOTE — PROGRESS NOTES
Subjective   Patient ID: Zhanna Perez is a 89 y.o. female who presents for Edema (Bilateral legs) and Med Refill.    Bilateral lower leg edema, no significant improvement with furosemide 20 mg daily. We will decrease amlodipine to 5 mg daily. Increase furosemide to 40 mg daily. Educated regarding low-sodium diet, elevating legs throughout the day. May need to increase lisinopril with decrease of amlodipine. BP monitor order sent to pharmacy so patient or family will be able to check BP at home. Follow-up in 1 month.  Lab Results       Component                Value               Date                       GLUCOSE                  217 (H)             05/31/2023                 CALCIUM                  9.4                 05/31/2023                 NA                       138                 05/31/2023                 K                        4.3                 05/31/2023                 CO2                      26                  05/31/2023                 CL                       102                 05/31/2023                 BUN                      14                  05/31/2023                 CREATININE               0.98                05/31/2023                      Review of Systems   Constitutional:  Negative for activity change, chills, fatigue and fever.   HENT:  Negative for congestion, rhinorrhea, sinus pressure, sinus pain and sore throat.    Eyes:  Positive for visual disturbance.   Respiratory:  Negative for cough, chest tightness, shortness of breath and wheezing.    Cardiovascular:  Positive for leg swelling. Negative for chest pain and palpitations.   Gastrointestinal:  Negative for abdominal distention, abdominal pain, constipation, diarrhea, nausea and vomiting.   Endocrine: Negative.    Genitourinary: Negative.    Musculoskeletal:  Positive for arthralgias.   Allergic/Immunologic: Negative.    Neurological:  Negative for dizziness, syncope, weakness, light-headedness, numbness and  headaches.   Hematological: Negative.    Psychiatric/Behavioral: Negative.     All other systems reviewed and are negative.      Objective   /60   Pulse 80   Temp 36.2 °C (97.2 °F)   Wt 76.2 kg (168 lb 1.6 oz)   SpO2 97%   BMI 37.69 kg/m²     Physical Exam  Vitals and nursing note reviewed.   Constitutional:       General: She is not in acute distress.     Appearance: Normal appearance. She is obese. She is not ill-appearing.   HENT:      Head: Normocephalic and atraumatic.      Right Ear: Tympanic membrane, ear canal and external ear normal.      Left Ear: Tympanic membrane, ear canal and external ear normal.      Nose: Nose normal.      Mouth/Throat:      Mouth: Mucous membranes are moist.      Pharynx: Oropharynx is clear.   Eyes:      Pupils: Pupils are equal, round, and reactive to light.   Cardiovascular:      Rate and Rhythm: Normal rate and regular rhythm.      Pulses: Normal pulses.      Heart sounds: Normal heart sounds. No murmur heard.  Pulmonary:      Effort: Pulmonary effort is normal. No respiratory distress.      Breath sounds: Normal breath sounds. No wheezing.   Abdominal:      General: Bowel sounds are normal. There is no distension.      Palpations: Abdomen is soft.      Tenderness: There is no abdominal tenderness.   Musculoskeletal:         General: No tenderness. Normal range of motion.      Cervical back: Normal range of motion and neck supple.      Right lower le+ Edema present.      Left lower le+ Edema present.      Comments: Uses cane   Skin:     General: Skin is warm and dry.      Capillary Refill: Capillary refill takes less than 2 seconds.      Coloration: Skin is not jaundiced.   Neurological:      General: No focal deficit present.      Mental Status: She is alert and oriented to person, place, and time.      Motor: No weakness.   Psychiatric:         Mood and Affect: Mood normal.         Behavior: Behavior normal.         Thought Content: Thought content normal.          Judgment: Judgment normal.         Assessment/Plan     # Lower leg edema  -Increase furosemide to 40 mg daily  -Decrease amlodipine to 5 mg daily  -Monitor blood pressure at home, bring record to next visit  -2 g sodium diet  -Elevate legs several times throughout the day  # Hypertension, controlled  -Continue lisinopril 20 mg daily, decrease amlodipine to 5 mg daily due to leg edema  -Lisinopril may need to be increased if BP elevates  -Increase furosemide to 40 mg daily  -Low fat/cholesterol, low sodium, low carbohydrate diet  -Exercise as tolerated 150 minutes/week, increase activity slowly  -Weight loss  -Monitor blood pressure at home, record and bring record to next visit    Follow-up in 1 month and as needed

## 2023-07-18 ENCOUNTER — HOSPITAL ENCOUNTER (OUTPATIENT)
Dept: DATA CONVERSION | Facility: HOSPITAL | Age: 88
End: 2023-07-18
Attending: ANESTHESIOLOGY | Admitting: ANESTHESIOLOGY
Payer: MEDICARE

## 2023-07-18 DIAGNOSIS — M54.17 RADICULOPATHY, LUMBOSACRAL REGION: ICD-10-CM

## 2023-07-18 DIAGNOSIS — M48.062 SPINAL STENOSIS, LUMBAR REGION WITH NEUROGENIC CLAUDICATION: ICD-10-CM

## 2023-07-27 ENCOUNTER — LAB (OUTPATIENT)
Dept: LAB | Facility: LAB | Age: 88
End: 2023-07-27
Payer: MEDICARE

## 2023-07-27 ENCOUNTER — OFFICE VISIT (OUTPATIENT)
Dept: PRIMARY CARE | Facility: CLINIC | Age: 88
End: 2023-07-27
Payer: MEDICARE

## 2023-07-27 VITALS
OXYGEN SATURATION: 96 % | WEIGHT: 165.5 LBS | DIASTOLIC BLOOD PRESSURE: 60 MMHG | HEART RATE: 88 BPM | BODY MASS INDEX: 37.1 KG/M2 | TEMPERATURE: 96.9 F | SYSTOLIC BLOOD PRESSURE: 136 MMHG

## 2023-07-27 DIAGNOSIS — R39.9 URINARY SYMPTOM OR SIGN: ICD-10-CM

## 2023-07-27 DIAGNOSIS — I10 PRIMARY HYPERTENSION: Primary | ICD-10-CM

## 2023-07-27 DIAGNOSIS — R61 NIGHT SWEATS: ICD-10-CM

## 2023-07-27 DIAGNOSIS — M79.89 LEG SWELLING: ICD-10-CM

## 2023-07-27 LAB
APPEARANCE, URINE: CLEAR
BILIRUBIN, URINE: NEGATIVE
BLOOD, URINE: NEGATIVE
COLOR, URINE: NORMAL
GLUCOSE, URINE: NEGATIVE MG/DL
KETONES, URINE: NEGATIVE MG/DL
LEUKOCYTE ESTERASE, URINE: NEGATIVE
NITRITE, URINE: NEGATIVE
PH, URINE: 7 (ref 5–8)
PROTEIN, URINE: NEGATIVE MG/DL
SPECIFIC GRAVITY, URINE: 1 (ref 1–1.03)
UROBILINOGEN, URINE: <2 MG/DL (ref 0–1.9)

## 2023-07-27 PROCEDURE — 3075F SYST BP GE 130 - 139MM HG: CPT | Performed by: NURSE PRACTITIONER

## 2023-07-27 PROCEDURE — 99213 OFFICE O/P EST LOW 20 MIN: CPT | Performed by: NURSE PRACTITIONER

## 2023-07-27 PROCEDURE — 1159F MED LIST DOCD IN RCRD: CPT | Performed by: NURSE PRACTITIONER

## 2023-07-27 PROCEDURE — 1160F RVW MEDS BY RX/DR IN RCRD: CPT | Performed by: NURSE PRACTITIONER

## 2023-07-27 PROCEDURE — 1125F AMNT PAIN NOTED PAIN PRSNT: CPT | Performed by: NURSE PRACTITIONER

## 2023-07-27 PROCEDURE — 1036F TOBACCO NON-USER: CPT | Performed by: NURSE PRACTITIONER

## 2023-07-27 PROCEDURE — 3078F DIAST BP <80 MM HG: CPT | Performed by: NURSE PRACTITIONER

## 2023-07-27 PROCEDURE — 81003 URINALYSIS AUTO W/O SCOPE: CPT

## 2023-07-27 RX ORDER — LISINOPRIL 30 MG/1
30 TABLET ORAL DAILY
Qty: 90 TABLET | Refills: 0 | Status: SHIPPED | OUTPATIENT
Start: 2023-07-27 | End: 2023-08-10 | Stop reason: SDUPTHER

## 2023-07-27 ASSESSMENT — ENCOUNTER SYMPTOMS
ACTIVITY CHANGE: 0
CONSTIPATION: 0
FATIGUE: 0
CHILLS: 0
HEADACHES: 0
DIZZINESS: 0
LIGHT-HEADEDNESS: 0
PALPITATIONS: 0
FEVER: 0
SINUS PAIN: 0
SORE THROAT: 0
WHEEZING: 0
CHEST TIGHTNESS: 0
HEMATOLOGIC/LYMPHATIC NEGATIVE: 1
PSYCHIATRIC NEGATIVE: 1
NUMBNESS: 0
WEAKNESS: 0
ABDOMINAL PAIN: 0
COUGH: 0
DIARRHEA: 0
VOMITING: 0
ABDOMINAL DISTENTION: 0
ENDOCRINE NEGATIVE: 1
RHINORRHEA: 0
ALLERGIC/IMMUNOLOGIC NEGATIVE: 1
SHORTNESS OF BREATH: 0
NAUSEA: 0
ARTHRALGIAS: 1
SINUS PRESSURE: 0

## 2023-07-27 NOTE — PROGRESS NOTES
Subjective   Patient ID: Zhanna Perez is a 89 y.o. female who presents for Edema (Continues in feet/ankles-bilateral).    Bilateral lower leg edema, improved.  Taking furosemide 20 mg 1 to 2 tablets depending on edema.  We will stop amlodipine 5 mg daily and increase lisinopril to 30 mg daily.  Educated regarding elevating legs.  C/o night sweats since last week.  Denies fever, chills, n/v, diarrhea, chest pain, dyspnea.  Denies sinus congestion or drainage, denies cough.  Needs UA today.  Follow-up as scheduled in August                     Review of Systems   Constitutional:  Negative for activity change, chills, fatigue and fever.   HENT:  Negative for congestion, rhinorrhea, sinus pressure, sinus pain and sore throat.    Eyes:  Positive for visual disturbance.   Respiratory:  Negative for cough, chest tightness, shortness of breath and wheezing.    Cardiovascular:  Positive for leg swelling. Negative for chest pain and palpitations.   Gastrointestinal:  Negative for abdominal distention, abdominal pain, constipation, diarrhea, nausea and vomiting.   Endocrine: Negative.    Genitourinary: Negative.    Musculoskeletal:  Positive for arthralgias.   Allergic/Immunologic: Negative.    Neurological:  Negative for dizziness, syncope, weakness, light-headedness, numbness and headaches.   Hematological: Negative.    Psychiatric/Behavioral: Negative.     All other systems reviewed and are negative.      Objective   /60   Pulse 88   Temp 36.1 °C (96.9 °F)   Wt 75.1 kg (165 lb 8 oz)   SpO2 96%   BMI 37.10 kg/m²     Physical Exam  Vitals and nursing note reviewed.   Constitutional:       General: She is not in acute distress.     Appearance: Normal appearance. She is obese. She is not ill-appearing.   HENT:      Head: Normocephalic and atraumatic.      Right Ear: Tympanic membrane, ear canal and external ear normal.      Left Ear: Tympanic membrane, ear canal and external ear normal.      Nose: Nose normal.       Mouth/Throat:      Mouth: Mucous membranes are moist.      Pharynx: Oropharynx is clear.   Eyes:      Pupils: Pupils are equal, round, and reactive to light.   Cardiovascular:      Rate and Rhythm: Normal rate and regular rhythm.      Pulses: Normal pulses.      Heart sounds: Normal heart sounds. No murmur heard.  Pulmonary:      Effort: Pulmonary effort is normal. No respiratory distress.      Breath sounds: Normal breath sounds. No wheezing.   Abdominal:      General: Bowel sounds are normal. There is no distension.      Palpations: Abdomen is soft.      Tenderness: There is no abdominal tenderness.   Musculoskeletal:         General: No tenderness. Normal range of motion.      Cervical back: Normal range of motion and neck supple.      Right lower le+ Edema present.      Left lower le+ Edema present.      Comments: Uses cane   Skin:     General: Skin is warm and dry.      Capillary Refill: Capillary refill takes less than 2 seconds.      Coloration: Skin is not jaundiced.   Neurological:      General: No focal deficit present.      Mental Status: She is alert and oriented to person, place, and time.      Motor: No weakness.   Psychiatric:         Mood and Affect: Mood normal.         Behavior: Behavior normal.         Thought Content: Thought content normal.         Judgment: Judgment normal.         Assessment/Plan     # Lower leg edema  -Continue furosemide 20 mg to 40 mg daily as needed  -Stop amlodipine 5 mg daily  -Monitor blood pressure at home, bring record to next visit  -2 g sodium diet  -Elevate legs several times throughout the day  # Hypertension, controlled  -Stop amlodipine  -Increase lisinopril to 30 mg daily  -Furosemide 20 to 40 mg as needed for leg swelling  -Low fat/cholesterol, low sodium, low carbohydrate diet  -Exercise as tolerated 150 minutes/week, increase activity slowly  -Weight loss  -Monitor blood pressure at home, record and bring record to next visit  #Night sweats  -Check  UA today     Follow-up as scheduled in August and as needed

## 2023-08-10 ENCOUNTER — OFFICE VISIT (OUTPATIENT)
Dept: PRIMARY CARE | Facility: CLINIC | Age: 88
End: 2023-08-10
Payer: MEDICARE

## 2023-08-10 VITALS
SYSTOLIC BLOOD PRESSURE: 140 MMHG | TEMPERATURE: 97.6 F | HEART RATE: 76 BPM | WEIGHT: 165.4 LBS | DIASTOLIC BLOOD PRESSURE: 80 MMHG | OXYGEN SATURATION: 98 % | BODY MASS INDEX: 37.08 KG/M2

## 2023-08-10 DIAGNOSIS — E78.00 HYPERCHOLESTEROLEMIA: ICD-10-CM

## 2023-08-10 DIAGNOSIS — I26.99 PULMONARY EMBOLISM, BILATERAL (MULTI): ICD-10-CM

## 2023-08-10 DIAGNOSIS — M79.89 LEG SWELLING: ICD-10-CM

## 2023-08-10 DIAGNOSIS — I10 PRIMARY HYPERTENSION: ICD-10-CM

## 2023-08-10 DIAGNOSIS — Z79.4 TYPE 2 DIABETES MELLITUS WITH HYPERGLYCEMIA, WITH LONG-TERM CURRENT USE OF INSULIN (MULTI): Primary | ICD-10-CM

## 2023-08-10 DIAGNOSIS — T78.40XD ALLERGY, SUBSEQUENT ENCOUNTER: ICD-10-CM

## 2023-08-10 DIAGNOSIS — F32.A DEPRESSION, UNSPECIFIED DEPRESSION TYPE: ICD-10-CM

## 2023-08-10 DIAGNOSIS — M25.432 PAIN AND SWELLING OF LEFT WRIST: ICD-10-CM

## 2023-08-10 DIAGNOSIS — E03.9 HYPOTHYROIDISM, ADULT: ICD-10-CM

## 2023-08-10 DIAGNOSIS — F32.5 DEPRESSION, MAJOR, IN REMISSION (CMS-HCC): ICD-10-CM

## 2023-08-10 DIAGNOSIS — M54.50 BACK PAIN, LUMBOSACRAL: ICD-10-CM

## 2023-08-10 DIAGNOSIS — K21.9 GASTROESOPHAGEAL REFLUX DISEASE, UNSPECIFIED WHETHER ESOPHAGITIS PRESENT: ICD-10-CM

## 2023-08-10 DIAGNOSIS — M25.532 PAIN AND SWELLING OF LEFT WRIST: ICD-10-CM

## 2023-08-10 DIAGNOSIS — E11.65 TYPE 2 DIABETES MELLITUS WITH HYPERGLYCEMIA, WITH LONG-TERM CURRENT USE OF INSULIN (MULTI): Primary | ICD-10-CM

## 2023-08-10 DIAGNOSIS — M81.0 OSTEOPOROSIS, UNSPECIFIED OSTEOPOROSIS TYPE, UNSPECIFIED PATHOLOGICAL FRACTURE PRESENCE: ICD-10-CM

## 2023-08-10 DIAGNOSIS — E55.9 VITAMIN D DEFICIENCY: ICD-10-CM

## 2023-08-10 DIAGNOSIS — E66.01 CLASS 2 SEVERE OBESITY WITH SERIOUS COMORBIDITY AND BODY MASS INDEX (BMI) OF 37.0 TO 37.9 IN ADULT, UNSPECIFIED OBESITY TYPE (MULTI): ICD-10-CM

## 2023-08-10 DIAGNOSIS — M54.16 LUMBAR RADICULITIS: ICD-10-CM

## 2023-08-10 PROBLEM — E66.812 CLASS 2 SEVERE OBESITY WITH SERIOUS COMORBIDITY AND BODY MASS INDEX (BMI) OF 38.0 TO 38.9 IN ADULT: Status: RESOLVED | Noted: 2023-03-28 | Resolved: 2023-08-10

## 2023-08-10 LAB
POC FINGERSTICK BLOOD GLUCOSE: 128 MG/DL (ref 70–100)
POC HEMOGLOBIN A1C: 8.3 % (ref 4.2–6.5)

## 2023-08-10 PROCEDURE — 82962 GLUCOSE BLOOD TEST: CPT | Performed by: NURSE PRACTITIONER

## 2023-08-10 PROCEDURE — 99214 OFFICE O/P EST MOD 30 MIN: CPT | Performed by: NURSE PRACTITIONER

## 2023-08-10 PROCEDURE — 1036F TOBACCO NON-USER: CPT | Performed by: NURSE PRACTITIONER

## 2023-08-10 PROCEDURE — 1159F MED LIST DOCD IN RCRD: CPT | Performed by: NURSE PRACTITIONER

## 2023-08-10 PROCEDURE — 83036 HEMOGLOBIN GLYCOSYLATED A1C: CPT | Performed by: NURSE PRACTITIONER

## 2023-08-10 PROCEDURE — 3077F SYST BP >= 140 MM HG: CPT | Performed by: NURSE PRACTITIONER

## 2023-08-10 PROCEDURE — 1125F AMNT PAIN NOTED PAIN PRSNT: CPT | Performed by: NURSE PRACTITIONER

## 2023-08-10 PROCEDURE — 3079F DIAST BP 80-89 MM HG: CPT | Performed by: NURSE PRACTITIONER

## 2023-08-10 PROCEDURE — 1160F RVW MEDS BY RX/DR IN RCRD: CPT | Performed by: NURSE PRACTITIONER

## 2023-08-10 RX ORDER — ROSUVASTATIN CALCIUM 20 MG/1
20 TABLET, COATED ORAL DAILY
Qty: 90 TABLET | Refills: 0 | Status: SHIPPED | OUTPATIENT
Start: 2023-08-10 | End: 2023-11-16 | Stop reason: SDUPTHER

## 2023-08-10 RX ORDER — GLIMEPIRIDE 2 MG/1
2 TABLET ORAL 2 TIMES DAILY
Qty: 180 TABLET | Refills: 0 | Status: SHIPPED | OUTPATIENT
Start: 2023-08-10 | End: 2023-11-16 | Stop reason: SDUPTHER

## 2023-08-10 RX ORDER — FOLIC ACID 1 MG/1
1 TABLET ORAL DAILY
Qty: 90 TABLET | Refills: 0 | Status: SHIPPED | OUTPATIENT
Start: 2023-08-10 | End: 2023-11-16 | Stop reason: SDUPTHER

## 2023-08-10 RX ORDER — LEVOTHYROXINE SODIUM 25 UG/1
25 TABLET ORAL DAILY
Qty: 90 TABLET | Refills: 0 | Status: SHIPPED | OUTPATIENT
Start: 2023-08-10 | End: 2023-11-16 | Stop reason: SDUPTHER

## 2023-08-10 RX ORDER — OMEPRAZOLE 20 MG/1
20 CAPSULE, DELAYED RELEASE ORAL
Qty: 90 CAPSULE | Refills: 0 | Status: SHIPPED | OUTPATIENT
Start: 2023-08-10 | End: 2023-11-06

## 2023-08-10 RX ORDER — ALENDRONATE SODIUM 70 MG/1
70 TABLET ORAL
Qty: 12 TABLET | Refills: 0 | Status: SHIPPED | OUTPATIENT
Start: 2023-08-10 | End: 2023-11-16 | Stop reason: SDUPTHER

## 2023-08-10 RX ORDER — FUROSEMIDE 20 MG/1
TABLET ORAL
Qty: 180 TABLET | Refills: 0 | Status: SHIPPED | OUTPATIENT
Start: 2023-08-10 | End: 2023-10-02

## 2023-08-10 RX ORDER — DULOXETIN HYDROCHLORIDE 30 MG/1
30 CAPSULE, DELAYED RELEASE ORAL DAILY
Qty: 90 CAPSULE | Refills: 0 | Status: SHIPPED | OUTPATIENT
Start: 2023-08-10 | End: 2023-11-16 | Stop reason: SDUPTHER

## 2023-08-10 RX ORDER — INSULIN GLARGINE 100 [IU]/ML
30 INJECTION, SOLUTION SUBCUTANEOUS NIGHTLY
Qty: 30 ML | Refills: 0 | Status: SHIPPED | OUTPATIENT
Start: 2023-08-10 | End: 2023-08-24 | Stop reason: CLARIF

## 2023-08-10 RX ORDER — LISINOPRIL 30 MG/1
30 TABLET ORAL DAILY
Qty: 90 TABLET | Refills: 0 | Status: SHIPPED | OUTPATIENT
Start: 2023-08-10 | End: 2023-11-16 | Stop reason: SDUPTHER

## 2023-08-10 ASSESSMENT — ENCOUNTER SYMPTOMS
PALPITATIONS: 0
ALLERGIC/IMMUNOLOGIC NEGATIVE: 1
CHILLS: 0
ABDOMINAL DISTENTION: 0
WHEEZING: 0
FATIGUE: 1
COUGH: 0
ARTHRALGIAS: 1
SINUS PRESSURE: 0
SINUS PAIN: 0
ENDOCRINE NEGATIVE: 1
SHORTNESS OF BREATH: 0
RHINORRHEA: 0
JOINT SWELLING: 1
DIZZINESS: 0
PSYCHIATRIC NEGATIVE: 1
ACTIVITY CHANGE: 0
DIARRHEA: 0
LIGHT-HEADEDNESS: 0
SORE THROAT: 0
HEMATOLOGIC/LYMPHATIC NEGATIVE: 1
CONSTIPATION: 0
WEAKNESS: 0
CHEST TIGHTNESS: 0

## 2023-08-10 NOTE — PATIENT INSTRUCTIONS

## 2023-08-10 NOTE — PROGRESS NOTES
Subjective   Patient ID: Zhanna Perez is a 89 y.o. female who presents for Diabetes (A1C, random), Hyperlipidemia, Hypertension, Hypothyroidism, Anxiety, and Fall (Fell last week, hurt left wrist painful, swollen when happened, hit head knot on left side).    Fall 1 week ago.  Patient was sitting on her walker watching her kitchen floor.  Leaned too much to reach, fell from walker onto her left side.  Hit the left side of her head and her left wrist.  Patient takes Eliquis.  Did not lose consciousness.  No headache, no nausea vomiting.  Had a small bump on her left head which is now resolved.  She declines CT of her head.  Left wrist swollen, tender.  Needs x-ray wrist.  Offered home health care for PT, balance training, skilled nursing.  Patient declines home health.  Bilateral lower leg edema improved with furosemide and stopping amlodipine.  Educated regarding low-sodium diet, elevating legs throughout the day, compression socks.  Diabetes, not controlled, improving A1c 8.3%, taking Lantus to 30 units at bedtime, glimepiride 4 mg twice a day. Continue to check blood sugar daily and bring record to next visit.   GERD, controlled with omeprazole 20 mg every morning.   Bilateral pulmonary emboli in August 2021. Patient saw hematology, further testing for coagulopathy was done. Hematology does not believe she has thrombophilia. Believes PE was provoked related to spinal stenosis and decreased mobility. Recommends staying on Eliquis long-term due to her risk of recurrent immobility. Hematology cleared her for spinal injections with holding Eliquis for 3 days prior to the procedure and resuming the day after.   Lumbar spondylosis, lumbar radiculitis.  Following with pain management, receiving injections.  Hypertension, controlled, continue lisinopril 30 mg daily.  Osteoporosis, taking alendronate 70 mg weekly. Taking Os-João daily. Vitamin D deficiency taking vitamin D 25 mcg daily.  DEXA due 2024.  Dyslipidemia,  taking rosuvastatin 20 mg daily.  Levothyroxine, controlled, taking levothyroxine 25 mcg daily.  Taking Cymbalta 30 mg daily for arthritis and depression, controlled  Due for complete labs before next visit    Preventive:  CRC screen: Aged out  Mammogram: 10/2022 negative, declines  DEXA scan: 11/2022 osteoporosis   Ophthalmology: [email or web address]  Podiatry: [facility] [[email or web address]]  Urine albumin: 10/2022       Review of Systems   Constitutional:  Positive for fatigue. Negative for activity change and chills.   HENT:  Negative for congestion, rhinorrhea, sinus pressure, sinus pain and sore throat.    Eyes:  Positive for visual disturbance.   Respiratory:  Negative for cough, chest tightness, shortness of breath and wheezing.    Cardiovascular:  Positive for leg swelling. Negative for chest pain and palpitations.   Gastrointestinal:  Negative for abdominal distention, constipation and diarrhea.   Endocrine: Negative.    Genitourinary: Negative.    Musculoskeletal:  Positive for arthralgias and joint swelling (L wrist pain and swelling).   Allergic/Immunologic: Negative.    Neurological:  Negative for dizziness, syncope, weakness and light-headedness.   Hematological: Negative.    Psychiatric/Behavioral: Negative.     All other systems reviewed and are negative.      Objective   /80   Pulse 76   Temp 36.4 °C (97.6 °F)   Wt 75 kg (165 lb 6.4 oz)   SpO2 98%   BMI 37.08 kg/m²     Physical Exam  Vitals and nursing note reviewed.   Constitutional:       General: She is not in acute distress.     Appearance: Normal appearance. She is obese. She is not ill-appearing.   HENT:      Head: Normocephalic and atraumatic.      Right Ear: Tympanic membrane, ear canal and external ear normal.      Left Ear: Tympanic membrane, ear canal and external ear normal.      Nose: Nose normal.      Mouth/Throat:      Mouth: Mucous membranes are moist.      Pharynx: Oropharynx is clear.   Eyes:      Pupils: Pupils are equal, round, and reactive to light.    Cardiovascular:      Rate and Rhythm: Normal rate and regular rhythm.      Pulses: Normal pulses.      Heart sounds: Normal heart sounds. No murmur heard.  Pulmonary:      Effort: Pulmonary effort is normal. No respiratory distress.      Breath sounds: Normal breath sounds. No wheezing.   Abdominal:      General: Bowel sounds are normal. There is no distension.      Palpations: Abdomen is soft.      Tenderness: There is no abdominal tenderness.   Musculoskeletal:         General: Normal range of motion.      Left wrist: Swelling and tenderness present.      Cervical back: Normal range of motion and neck supple.      Right lower le+ Edema present.      Left lower le+ Edema present.      Comments: Uses walker   Skin:     General: Skin is warm and dry.      Capillary Refill: Capillary refill takes less than 2 seconds.      Coloration: Skin is not jaundiced.   Neurological:      General: No focal deficit present.      Mental Status: She is alert and oriented to person, place, and time.      Motor: No weakness.   Psychiatric:         Mood and Affect: Mood normal.         Behavior: Behavior normal.         Thought Content: Thought content normal.         Judgment: Judgment normal.         Assessment/Plan     # Fall  -declines PT and HHC  # Left wrist pain and swelling, injury  -X-ray left wrist  -Follow-up results for further recommendations  -Tylenol 650-1000 mg every 8 hours as needed for pain  -Rest, elevation  -Call the office if symptoms worsen or do not improve  # Diabetes, not controlled, A1c 8.3%  -Continue glimepiride 4 mg 2 x daily and increase Lantus to 30 units at bedtime  -Check FBS daily, bring record to next visit; check BS as needed for low BS or other concerns  -Low fat/cholesterol, low sodium, carbohydrate controlled diet  # Bilateral PEs  -Continue Eliquis 5 mg twice a day  -follow with hematology as needed  # Hypertension, controlled  -taking lisinopril 30 mg daily  -Low fat/cholesterol, low  sodium, low carbohydrate diet  -Exercise as tolerated 150 minutes/week, increase activity slowly  -Weight loss  # Osteoporosis  -continue alendronate 70 mg weekly  -weight bearing exercises  -DEXA due 2024  # Lumbar stenosis, back pain, Neuropathy  -follow with pain management  # Macular degeneration, legally blind  -follow with opthalmology  # Depression, chronic musculoskeletal pain, controlled  -Continue Cymbalta 30 mg daily  # Hypothyroidism  -Continue levothyroxine 25 Âµg daily  -monitor TSH  # Obesity, BMI 37  -Avoid sweets and sugary drinks  -Drink plenty of water  -Avoid processed foods and refined foods  -Eat fruits, vegetables, lean protein, whole grains  -Increase your activity level  # GERD, proved  -Continue omeprazole 20 mg daily  -avoid alcohol, caffeine, acidic foods, other foods that tend to bother your stomach  -Elevate head of bed 4 to 6 inches on blocks  -Avoid eating 2 to 3 hours prior to bedtime  -Do not lay down after eating  -Do not wear constricting clothing around your middle     Follow-up in 3 months for Medicare Physical and as needed     Patient was identified as a fall risk. Risk prevention instructions provided.

## 2023-08-24 DIAGNOSIS — Z79.4 TYPE 2 DIABETES MELLITUS WITH HYPERGLYCEMIA, WITH LONG-TERM CURRENT USE OF INSULIN (MULTI): Primary | ICD-10-CM

## 2023-08-24 DIAGNOSIS — E11.65 TYPE 2 DIABETES MELLITUS WITH HYPERGLYCEMIA, WITH LONG-TERM CURRENT USE OF INSULIN (MULTI): Primary | ICD-10-CM

## 2023-08-24 RX ORDER — INSULIN GLARGINE 100 [IU]/ML
30 INJECTION, SOLUTION SUBCUTANEOUS NIGHTLY
Qty: 30 ML | Refills: 0 | Status: SHIPPED | OUTPATIENT
Start: 2023-08-24 | End: 2023-11-16 | Stop reason: SDUPTHER

## 2023-08-25 LAB
AMPHETAMINE (PRESENCE) IN URINE BY SCREEN METHOD: NORMAL
BARBITURATES PRESENCE IN URINE BY SCREEN METHOD: NORMAL
BENZODIAZEPINE (PRESENCE) IN URINE BY SCREEN METHOD: NORMAL
CANNABINOIDS IN URINE BY SCREEN METHOD: NORMAL
COCAINE (PRESENCE) IN URINE BY SCREEN METHOD: NORMAL
DRUG SCREEN COMMENT URINE: NORMAL
FENTANYL URINE: NORMAL
OPIATES (PRESENCE) IN URINE BY SCREEN METHOD: NORMAL
OXYCODONE (PRESENCE) IN URINE BY SCREEN METHOD: NORMAL
PHENCYCLIDINE (PRESENCE) IN URINE BY SCREEN METHOD: NORMAL

## 2023-09-18 ENCOUNTER — OFFICE VISIT (OUTPATIENT)
Dept: PRIMARY CARE | Facility: CLINIC | Age: 88
End: 2023-09-18
Payer: MEDICARE

## 2023-09-18 VITALS
HEART RATE: 84 BPM | TEMPERATURE: 97.3 F | SYSTOLIC BLOOD PRESSURE: 132 MMHG | OXYGEN SATURATION: 95 % | DIASTOLIC BLOOD PRESSURE: 58 MMHG

## 2023-09-18 DIAGNOSIS — S01.01XS LACERATION OF SCALP, SEQUELA: Primary | ICD-10-CM

## 2023-09-18 DIAGNOSIS — Z79.4 TYPE 2 DIABETES MELLITUS WITH HYPERGLYCEMIA, WITH LONG-TERM CURRENT USE OF INSULIN (MULTI): ICD-10-CM

## 2023-09-18 DIAGNOSIS — E11.65 TYPE 2 DIABETES MELLITUS WITH HYPERGLYCEMIA, WITH LONG-TERM CURRENT USE OF INSULIN (MULTI): ICD-10-CM
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
## 2023-09-18 DIAGNOSIS — R53.81 PHYSICAL DECONDITIONING: ICD-10-CM

## 2023-09-18 DIAGNOSIS — I10 PRIMARY HYPERTENSION: ICD-10-CM

## 2023-09-18 DIAGNOSIS — H54.3 BLINDNESS OF BOTH EYES: ICD-10-CM

## 2023-09-18 DIAGNOSIS — R26.81 UNSTEADINESS: ICD-10-CM

## 2023-09-18 DIAGNOSIS — M54.50 BACK PAIN, LUMBOSACRAL: ICD-10-CM

## 2023-09-18 PROBLEM — S01.01XA SCALP LACERATION: Status: ACTIVE | Noted: 2023-09-18

## 2023-09-18 PROCEDURE — 1159F MED LIST DOCD IN RCRD: CPT | Performed by: NURSE PRACTITIONER

## 2023-09-18 PROCEDURE — 1036F TOBACCO NON-USER: CPT | Performed by: NURSE PRACTITIONER

## 2023-09-18 PROCEDURE — 99214 OFFICE O/P EST MOD 30 MIN: CPT | Performed by: NURSE PRACTITIONER

## 2023-09-18 PROCEDURE — 1125F AMNT PAIN NOTED PAIN PRSNT: CPT | Performed by: NURSE PRACTITIONER

## 2023-09-18 PROCEDURE — 3075F SYST BP GE 130 - 139MM HG: CPT | Performed by: NURSE PRACTITIONER

## 2023-09-18 PROCEDURE — 3078F DIAST BP <80 MM HG: CPT | Performed by: NURSE PRACTITIONER

## 2023-09-18 PROCEDURE — 1160F RVW MEDS BY RX/DR IN RCRD: CPT | Performed by: NURSE PRACTITIONER

## 2023-09-18 RX ORDER — CEPHALEXIN 500 MG/1
TABLET ORAL
COMMUNITY
Start: 2023-09-16 | End: 2023-11-16 | Stop reason: ALTCHOICE

## 2023-09-18 NOTE — PROGRESS NOTES
Subjective   Patient ID: Zhanna Perez is a 89 y.o. female who presents for Follow-up ( x 4 staples, went back to er on Friday for swelling and pain had an infection did not  antibiotic yet), clearance for steroid injection tomorrow, and requests home health.    Here for ER follow up.  Went to ED 9/12 after falling out of bed and striking the back of her head on the bedside stand.  Laceration, needed for staples to close.  Eliquis for history of PE.  CT the head was negative for acute intracranial process, no C-spine fracture.  She return to the ED 9/15 for pain in her head.  CT of the head was again negative for acute intracranial process.  She was prescribed antibiotics for localized infection in the area of the laceration.  She has not picked up the antibiotics yet.  Needs to get the antibiotics today.  She is scheduled for steroid injection with Dr. Way tomorrow.  She has been holding her Eliquis for 3 days.  She may proceed with the injection.  Patient would like home health care, she is homebound, she has blind does not drive.  Uses a walker.  Patient needs nursing for disease management and teaching, PT for chronic back pain, physical deconditioning.  Patient will return on Thursday for staple removal.         Review of Systems   Constitutional:  Positive for fatigue. Negative for activity change and chills.   HENT:  Negative for congestion, rhinorrhea, sinus pressure, sinus pain and sore throat.    Eyes:  Positive for visual disturbance.   Respiratory:  Negative for cough, chest tightness, shortness of breath and wheezing.    Cardiovascular:  Positive for leg swelling. Negative for chest pain and palpitations.   Gastrointestinal:  Negative for abdominal distention, constipation and diarrhea.   Endocrine: Negative.    Genitourinary: Negative.    Musculoskeletal:  Positive for arthralgias and back pain.   Skin:  Positive for wound.   Allergic/Immunologic: Negative.    Neurological:  Positive for  weakness. Negative for dizziness, syncope and light-headedness.   Hematological: Negative.    Psychiatric/Behavioral: Negative.     All other systems reviewed and are negative.      Objective   /58   Pulse 84   Temp 36.3 °C (97.3 °F)   SpO2 95%     Physical Exam  Vitals and nursing note reviewed.   Constitutional:       General: She is not in acute distress.     Appearance: Normal appearance. She is obese. She is not ill-appearing.   HENT:      Head: Normocephalic and atraumatic.      Right Ear: Tympanic membrane, ear canal and external ear normal.      Left Ear: Tympanic membrane, ear canal and external ear normal.      Nose: Nose normal.      Mouth/Throat:      Mouth: Mucous membranes are moist.      Pharynx: Oropharynx is clear.   Eyes:      Pupils: Pupils are equal, round, and reactive to light.   Cardiovascular:      Rate and Rhythm: Normal rate and regular rhythm.      Pulses: Normal pulses.      Heart sounds: Normal heart sounds. No murmur heard.  Pulmonary:      Effort: Pulmonary effort is normal. No respiratory distress.      Breath sounds: Normal breath sounds. No wheezing.   Abdominal:      General: Bowel sounds are normal. There is no distension.      Palpations: Abdomen is soft.      Tenderness: There is no abdominal tenderness.   Musculoskeletal:         General: Normal range of motion.      Left wrist: Swelling and tenderness present.      Cervical back: Normal range of motion and neck supple.      Right lower le+ Edema present.      Left lower le+ Edema present.      Comments: Uses walker   Skin:     General: Skin is warm and dry.      Capillary Refill: Capillary refill takes less than 2 seconds.      Coloration: Skin is not jaundiced.      Findings: Wound (posterior scalp, 4 staples intact, hematoma, large scab) present.   Neurological:      General: No focal deficit present.      Mental Status: She is alert and oriented to person, place, and time.      Motor: No weakness.    Psychiatric:         Mood and Affect: Mood normal.         Behavior: Behavior normal.         Thought Content: Thought content normal.         Judgment: Judgment normal.         Assessment/Plan     #Scalp laceration  -Return Thursday for staple removal  #Physical deconditioning  -Needs home health care for PT  # Diabetes, not controlled, A1c 8.3%  -Continue glimepiride 4 mg 2 x daily and increase Lantus to 30 units at bedtime  -Check FBS daily, bring record to next visit; check BS as needed for low BS or other concerns  -Low fat/cholesterol, low sodium, carbohydrate controlled diet  # Bilateral PEs  -Continue Eliquis 5 mg twice a day  -follow with hematology as needed  # Hypertension, controlled  -taking lisinopril 30 mg daily  -Low fat/cholesterol, low sodium, low carbohydrate diet  -Exercise as tolerated 150 minutes/week, increase activity slowly  -Weight loss  # Osteoporosis  -continue alendronate 70 mg weekly  -weight bearing exercises  -DEXA due 2024  # Lumbar stenosis, back pain, Neuropathy  -Patient may proceed with injection tomorrow  -follow with pain management  # Macular degeneration, legally blind  -follow with opthalmology  # Depression, chronic musculoskeletal pain, controlled  -Continue Cymbalta 30 mg daily  -Needs PT  # Hypothyroidism  -Continue levothyroxine 25 mcg daily  -monitor TSH  # Obesity, BMI 37  -Avoid sweets and sugary drinks  -Drink plenty of water  -Avoid processed foods and refined foods  -Eat fruits, vegetables, lean protein, whole grains  -Increase your activity level  # GERD, improved  -Continue omeprazole 20 mg daily  -avoid alcohol, caffeine, acidic foods, other foods that tend to bother your stomach  -Elevate head of bed 4 to 6 inches on blocks  -Avoid eating 2 to 3 hours prior to bedtime  -Do not lay down after eating  -Do not wear constricting clothing around your middle     Follow-up Thursday for staple removal and in November for Medicare Physical and as needed     Patient  was identified as a fall risk. Risk prevention instructions provided.

## 2023-09-18 NOTE — PATIENT INSTRUCTIONS

## 2023-09-19 ASSESSMENT — ENCOUNTER SYMPTOMS
PSYCHIATRIC NEGATIVE: 1
SHORTNESS OF BREATH: 0
ABDOMINAL DISTENTION: 0
CHEST TIGHTNESS: 0
RHINORRHEA: 0
SORE THROAT: 0
COUGH: 0
ALLERGIC/IMMUNOLOGIC NEGATIVE: 1
ARTHRALGIAS: 1
PALPITATIONS: 0
DIZZINESS: 0
LIGHT-HEADEDNESS: 0
ENDOCRINE NEGATIVE: 1
CONSTIPATION: 0
DIARRHEA: 0
CHILLS: 0
SINUS PRESSURE: 0
WHEEZING: 0
BACK PAIN: 1
HEMATOLOGIC/LYMPHATIC NEGATIVE: 1
FATIGUE: 1
WEAKNESS: 1
WOUND: 1
SINUS PAIN: 0
ACTIVITY CHANGE: 0

## 2023-09-21 ENCOUNTER — OFFICE VISIT (OUTPATIENT)
Dept: PRIMARY CARE | Facility: CLINIC | Age: 88
End: 2023-09-21
Payer: MEDICARE

## 2023-09-21 VITALS
HEART RATE: 80 BPM | OXYGEN SATURATION: 96 % | DIASTOLIC BLOOD PRESSURE: 72 MMHG | SYSTOLIC BLOOD PRESSURE: 164 MMHG | TEMPERATURE: 97.3 F

## 2023-09-21 DIAGNOSIS — H54.3 BLINDNESS OF BOTH EYES: ICD-10-CM

## 2023-09-21 DIAGNOSIS — H35.30 MACULAR DEGENERATION OF BOTH EYES, UNSPECIFIED TYPE: ICD-10-CM

## 2023-09-21 DIAGNOSIS — S01.01XS LACERATION OF SCALP, SEQUELA: Primary | ICD-10-CM

## 2023-09-21 DIAGNOSIS — M46.1 SACROILIITIS, NOT ELSEWHERE CLASSIFIED (CMS-HCC): ICD-10-CM

## 2023-09-21 PROCEDURE — 3078F DIAST BP <80 MM HG: CPT | Performed by: NURSE PRACTITIONER

## 2023-09-21 PROCEDURE — 1125F AMNT PAIN NOTED PAIN PRSNT: CPT | Performed by: NURSE PRACTITIONER

## 2023-09-21 PROCEDURE — 3077F SYST BP >= 140 MM HG: CPT | Performed by: NURSE PRACTITIONER

## 2023-09-21 PROCEDURE — 1160F RVW MEDS BY RX/DR IN RCRD: CPT | Performed by: NURSE PRACTITIONER

## 2023-09-21 PROCEDURE — 1159F MED LIST DOCD IN RCRD: CPT | Performed by: NURSE PRACTITIONER

## 2023-09-21 PROCEDURE — 1036F TOBACCO NON-USER: CPT | Performed by: NURSE PRACTITIONER

## 2023-09-21 PROCEDURE — 99213 OFFICE O/P EST LOW 20 MIN: CPT | Performed by: NURSE PRACTITIONER

## 2023-09-21 ASSESSMENT — ENCOUNTER SYMPTOMS
SINUS PAIN: 0
LIGHT-HEADEDNESS: 0
CONSTIPATION: 0
FATIGUE: 1
CHEST TIGHTNESS: 0
DIZZINESS: 0
ENDOCRINE NEGATIVE: 1
WEAKNESS: 1
ACTIVITY CHANGE: 0
HEMATOLOGIC/LYMPHATIC NEGATIVE: 1
COUGH: 0
ARTHRALGIAS: 1
SINUS PRESSURE: 0
SORE THROAT: 0
PALPITATIONS: 0
ABDOMINAL DISTENTION: 0
SHORTNESS OF BREATH: 0
WOUND: 1
RHINORRHEA: 0
ALLERGIC/IMMUNOLOGIC NEGATIVE: 1
DIARRHEA: 0
CHILLS: 0
WHEEZING: 0
PSYCHIATRIC NEGATIVE: 1
BACK PAIN: 1

## 2023-09-21 NOTE — PATIENT INSTRUCTIONS

## 2023-09-21 NOTE — PROGRESS NOTES
Subjective   Patient ID: Zhanna Perez is a 89 y.o. female who presents for Suture / Staple Removal (4 staples on back of scalp), Eye Problem (Eye sight worsening), and needs note for Rosaura Ferro/Siddhartha to have bilateral facet (Had to cancel Tues. inj).    Removed 4 staples posterior scalp without incident.  Patient educated to keep the wound clean and dry, do not remove scabs.  Patient's eyesight is worsening.  Her family is concerned about preparing meals.  I suggested meal delivery such as Meals on Wheels.  She states she is not interested.  States she is fine it just takes her longer to prepare her food.  Patient may proceed with injections by pain management after holding Eliquis for 3 days.           Review of Systems   Constitutional:  Positive for fatigue. Negative for activity change and chills.   HENT:  Negative for congestion, rhinorrhea, sinus pressure, sinus pain and sore throat.    Eyes:  Positive for visual disturbance.   Respiratory:  Negative for cough, chest tightness, shortness of breath and wheezing.    Cardiovascular:  Positive for leg swelling. Negative for chest pain and palpitations.   Gastrointestinal:  Negative for abdominal distention, constipation and diarrhea.   Endocrine: Negative.    Genitourinary: Negative.    Musculoskeletal:  Positive for arthralgias and back pain.   Skin:  Positive for wound.   Allergic/Immunologic: Negative.    Neurological:  Positive for weakness. Negative for dizziness, syncope and light-headedness.   Hematological: Negative.    Psychiatric/Behavioral: Negative.     All other systems reviewed and are negative.      Objective   /72   Pulse 80   Temp 36.3 °C (97.3 °F)   SpO2 96%     Physical Exam  Vitals and nursing note reviewed.   Constitutional:       General: She is not in acute distress.     Appearance: Normal appearance. She is obese. She is not ill-appearing.   HENT:      Head: Normocephalic and atraumatic.      Right Ear: Tympanic membrane,  ear canal and external ear normal.      Left Ear: Tympanic membrane, ear canal and external ear normal.      Nose: Nose normal.      Mouth/Throat:      Mouth: Mucous membranes are moist.      Pharynx: Oropharynx is clear.   Eyes:      Pupils: Pupils are equal, round, and reactive to light.   Cardiovascular:      Rate and Rhythm: Normal rate and regular rhythm.      Pulses: Normal pulses.      Heart sounds: Normal heart sounds. No murmur heard.  Pulmonary:      Effort: Pulmonary effort is normal. No respiratory distress.      Breath sounds: Normal breath sounds. No wheezing.   Abdominal:      General: Bowel sounds are normal. There is no distension.      Palpations: Abdomen is soft.      Tenderness: There is no abdominal tenderness.   Musculoskeletal:         General: Normal range of motion.      Left wrist: Swelling and tenderness present.      Cervical back: Normal range of motion and neck supple.      Right lower le+ Edema present.      Left lower le+ Edema present.      Comments: Uses walker   Skin:     General: Skin is warm and dry.      Capillary Refill: Capillary refill takes less than 2 seconds.      Coloration: Skin is not jaundiced.      Findings: Wound (posterior scalp, hematoma, large scab) present.   Neurological:      General: No focal deficit present.      Mental Status: She is alert and oriented to person, place, and time.      Motor: No weakness.   Psychiatric:         Mood and Affect: Mood normal.         Behavior: Behavior normal.         Thought Content: Thought content normal.         Judgment: Judgment normal.         Assessment/Plan     #Scalp laceration  -Staples removed  -Keep the wound clean and dry  -Do not remove scabs  #Physical deconditioning  -Needs home health care for PT  # Diabetes, not controlled, A1c 8.3%  -Continue glimepiride 4 mg 2 x daily and increase Lantus to 30 units at bedtime  -Check FBS daily, bring record to next visit; check BS as needed for low BS or other  concerns  -Low fat/cholesterol, low sodium, carbohydrate controlled diet  # Bilateral PEs  -Continue Eliquis 5 mg twice a day  -follow with hematology as needed  # Hypertension, controlled  -taking lisinopril 30 mg daily  -Low fat/cholesterol, low sodium, low carbohydrate diet  -Exercise as tolerated 150 minutes/week, increase activity slowly  -Weight loss  # Osteoporosis  -continue alendronate 70 mg weekly  -weight bearing exercises  -DEXA due 2024  # Lumbar stenosis, back pain, Neuropathy  -Patient may proceed with injection after holding Eliquis for 3 days  -follow with pain management  # Macular degeneration, legally blind  -follow with opthalmology  # Depression, chronic musculoskeletal pain, controlled  -Continue Cymbalta 30 mg daily  -Needs PT  # Hypothyroidism  -Continue levothyroxine 25 mcg daily  -monitor TSH  # Obesity, BMI 37  -Avoid sweets and sugary drinks  -Drink plenty of water  -Avoid processed foods and refined foods  -Eat fruits, vegetables, lean protein, whole grains  -Increase your activity level  # GERD, improved  -Continue omeprazole 20 mg daily  -avoid alcohol, caffeine, acidic foods, other foods that tend to bother your stomach  -Elevate head of bed 4 to 6 inches on blocks  -Avoid eating 2 to 3 hours prior to bedtime  -Do not lay down after eating  -Do not wear constricting clothing around your middle     Follow-up in November for Medicare Physical and as needed     Patient was identified as a fall risk. Risk prevention instructions provided.

## 2023-09-22 ENCOUNTER — APPOINTMENT (OUTPATIENT)
Dept: PRIMARY CARE | Facility: CLINIC | Age: 88
End: 2023-09-22
Payer: MEDICARE

## 2023-09-29 VITALS — WEIGHT: 166.45 LBS | BODY MASS INDEX: 37.44 KG/M2 | HEIGHT: 56 IN

## 2023-10-02 DIAGNOSIS — M79.89 LEG SWELLING: ICD-10-CM

## 2023-10-02 RX ORDER — FUROSEMIDE 20 MG/1
TABLET ORAL
Qty: 90 TABLET | Refills: 0 | Status: SHIPPED | OUTPATIENT
Start: 2023-10-02 | End: 2023-11-16 | Stop reason: SDUPTHER

## 2023-10-05 DIAGNOSIS — M46.1 SACROILIITIS, NOT ELSEWHERE CLASSIFIED (CMS-HCC): Primary | ICD-10-CM

## 2023-10-06 DIAGNOSIS — M47.817 FACET ARTHRITIS OF LUMBOSACRAL REGION: Primary | ICD-10-CM

## 2023-10-10 ENCOUNTER — APPOINTMENT (OUTPATIENT)
Dept: PAIN MEDICINE | Facility: HOSPITAL | Age: 88
End: 2023-10-10
Payer: MEDICARE

## 2023-10-10 ENCOUNTER — HOSPITAL ENCOUNTER (OUTPATIENT)
Dept: PAIN MEDICINE | Facility: HOSPITAL | Age: 88
Discharge: HOME | End: 2023-10-10
Payer: MEDICARE

## 2023-10-10 ENCOUNTER — HOSPITAL ENCOUNTER (OUTPATIENT)
Dept: RADIOLOGY | Facility: HOSPITAL | Age: 88
Discharge: HOME | End: 2023-10-10
Payer: MEDICARE

## 2023-10-10 VITALS
RESPIRATION RATE: 16 BRPM | BODY MASS INDEX: 34.52 KG/M2 | WEIGHT: 160 LBS | TEMPERATURE: 96.8 F | HEIGHT: 57 IN | HEART RATE: 74 BPM | DIASTOLIC BLOOD PRESSURE: 82 MMHG | SYSTOLIC BLOOD PRESSURE: 178 MMHG | OXYGEN SATURATION: 95 %

## 2023-10-10 DIAGNOSIS — M54.50 BACK PAIN, LUMBOSACRAL: Primary | ICD-10-CM

## 2023-10-10 DIAGNOSIS — M46.1 SACROILIITIS, NOT ELSEWHERE CLASSIFIED (CMS-HCC): ICD-10-CM

## 2023-10-10 DIAGNOSIS — R52 PAIN: ICD-10-CM

## 2023-10-10 PROCEDURE — 27096 INJECT SACROILIAC JOINT: CPT | Mod: 50

## 2023-10-10 PROCEDURE — G0260 INJ FOR SACROILIAC JT ANESTH: HCPCS | Mod: 50

## 2023-10-10 PROCEDURE — 7100000010 HC PHASE TWO TIME - EACH INCREMENTAL 1 MINUTE: Performed by: ANESTHESIOLOGY

## 2023-10-10 PROCEDURE — 77003 FLUOROGUIDE FOR SPINE INJECT: CPT

## 2023-10-10 PROCEDURE — 77003 FLUOROGUIDE FOR SPINE INJECT: CPT | Performed by: ANESTHESIOLOGY

## 2023-10-10 PROCEDURE — 2500000001 HC RX 250 WO HCPCS SELF ADMINISTERED DRUGS (ALT 637 FOR MEDICARE OP): Performed by: PHYSICIAN ASSISTANT

## 2023-10-10 PROCEDURE — G0260 INJ FOR SACROILIAC JT ANESTH: HCPCS | Performed by: ANESTHESIOLOGY

## 2023-10-10 PROCEDURE — 27096 INJECT SACROILIAC JOINT: CPT | Performed by: ANESTHESIOLOGY

## 2023-10-10 PROCEDURE — 7100000009 HC PHASE TWO TIME - INITIAL BASE CHARGE: Performed by: ANESTHESIOLOGY

## 2023-10-10 RX ORDER — FENTANYL CITRATE 50 UG/ML
100 INJECTION, SOLUTION INTRAMUSCULAR; INTRAVENOUS ONCE
Status: DISCONTINUED | OUTPATIENT
Start: 2023-10-10 | End: 2023-10-20 | Stop reason: HOSPADM

## 2023-10-10 RX ORDER — MIDAZOLAM HYDROCHLORIDE 1 MG/ML
2 INJECTION INTRAMUSCULAR; INTRAVENOUS AS NEEDED
Status: DISCONTINUED | OUTPATIENT
Start: 2023-10-10 | End: 2023-10-20 | Stop reason: HOSPADM

## 2023-10-10 RX ORDER — CIPROFLOXACIN 500 MG/1
TABLET ORAL
Status: DISCONTINUED
Start: 2023-10-10 | End: 2023-10-10 | Stop reason: HOSPADM

## 2023-10-10 RX ORDER — CIPROFLOXACIN 500 MG/1
500 TABLET ORAL ONCE
Status: COMPLETED | OUTPATIENT
Start: 2023-10-10 | End: 2023-10-10

## 2023-10-10 RX ADMIN — CIPROFLOXACIN 500 MG: 500 TABLET, FILM COATED ORAL at 10:16

## 2023-10-10 ASSESSMENT — PAIN SCALES - GENERAL
PAINLEVEL_OUTOF10: 7
PAINLEVEL_OUTOF10: 0 - NO PAIN

## 2023-10-10 ASSESSMENT — PAIN - FUNCTIONAL ASSESSMENT
PAIN_FUNCTIONAL_ASSESSMENT: 0-10
PAIN_FUNCTIONAL_ASSESSMENT: 0-10

## 2023-10-10 ASSESSMENT — COLUMBIA-SUICIDE SEVERITY RATING SCALE - C-SSRS
1. IN THE PAST MONTH, HAVE YOU WISHED YOU WERE DEAD OR WISHED YOU COULD GO TO SLEEP AND NOT WAKE UP?: NO
2. HAVE YOU ACTUALLY HAD ANY THOUGHTS OF KILLING YOURSELF?: NO
2. HAVE YOU ACTUALLY HAD ANY THOUGHTS OF KILLING YOURSELF?: NO
6. HAVE YOU EVER DONE ANYTHING, STARTED TO DO ANYTHING, OR PREPARED TO DO ANYTHING TO END YOUR LIFE?: NO
6. HAVE YOU EVER DONE ANYTHING, STARTED TO DO ANYTHING, OR PREPARED TO DO ANYTHING TO END YOUR LIFE?: NO
1. IN THE PAST MONTH, HAVE YOU WISHED YOU WERE DEAD OR WISHED YOU COULD GO TO SLEEP AND NOT WAKE UP?: NO

## 2023-10-10 NOTE — DISCHARGE INSTRUCTIONS
No heavy lifting or strenuous activity today.    Keep bandage on for 24 hours.  Keep injection site clean and dry, it may be sore for up to 48 hours.  For relief of pain and swelling, you may apply ice to the injection site area.  If pain persist you may apply moist heat.  Common side effects of steroids include insomnia, facial flushing, increased appetite, headaches, sweating, fluid retention. If you have diabetes your blood sugar may increase. Please monitor your diet and your blood sugar should return to normal in a few days. If your sugar becomes dangerously high, please contact your physician that manages your diabetes for further guidance.  Rare side effects include infection, bleeding, nerve damage. If you have fever, redness or swelling near site, severe pain, please go to the nearest emergency room. For other questions please call office at [phone]. Local anesthetics wear off in several hours and duration of relief vary from person to person.

## 2023-10-10 NOTE — PROCEDURES
Sacroiliac joint injection  Preoperative/postop diagnosis: Sacroiliitis; osteoarthritis lumbosacral spine; lumbago  Anesthesia: local  Complications: None  Estimated blood loss: None  Preoperative/postop diagnosis: Sacroiliitis; osteoarthritis lumbosacral spine; lumbago  Patient is a 89 y.o. year-old  female who is here today to undergo a sacroiliac joint injection with fluoroscopy.  A sterile prep and drape ×3 was done with Betadine and ChloraPrep.  I then used a sterile 8 inch ring forceps and identified mid and the inferior border of the SI joint on the right.  3 cc 1% Xylocaine plain was then injected via 25-gauge 1-1/2 sterile needle for local anesthesia. This was followed by placement of two sterile 22-gauge 3-1/2  spinal needles in the mid point and inferior border of the joint space. Negative aspiration was noted of fluids/blood and there were no paresthesias.  A total of 2 cc of Isovue outlined the joint space which was immediately followed by injection of 40 mg of Kenalog +3 cc of 0.25% Marcaine plain. The needles were then removed and a sterile bandage and Neosporin ointment applied over the puncture sites . A total of two needles were used per side.   The patient tolerated the procedure well and was taken to the recovery room in awake stable condition with no neurologic deficit or pain.  A followup visit is scheduled in 4-6 weeks.    Joint Aspiration/Injection    Date/Time: 10/10/2023 10:31 AM    Performed by: Todd Way DO  Authorized by: Todd Way DO    Consent:     Consent obtained:  Written and verbal    Consent given by:  Patient    Risks, benefits, and alternatives were discussed: yes      Risks discussed:  Bleeding, infection, pain and nerve damage    Alternatives discussed:  No treatment  Universal protocol:     Procedure explained and questions answered to patient or proxy's satisfaction: yes      Relevant documents present and verified: yes      Test results  available: yes      Imaging studies available: yes      Required blood products, implants, devices, and special equipment available: no      Site/side marked: yes      Immediately prior to procedure, a time out was called: yes      Patient identity confirmed:  Verbally with patient, arm band and hospital-assigned identification number  Location:     Location: Lumbar spine.  Anesthesia:     Anesthesia method:  Local infiltration    Local anesthetic:  Lidocaine 2% w/o epi  Procedure details:     Preparation: Patient was prepped and draped in usual sterile fashion      Needle gauge:  22 G    Approach:  Posterior    Steroid injected: yes      Specimen collected: no    Post-procedure details:     Dressing:  Sterile dressing and adhesive bandage    Procedure completion:  Tolerated well, no immediate complications

## 2023-10-10 NOTE — Clinical Note
Prepped with ChloraPrep, a minimum of 3 minute dry time, longer if needed, no pooling noted, patient draped in sterile fashion. Back

## 2023-10-11 ASSESSMENT — PAIN SCALES - GENERAL: PAINLEVEL_OUTOF10: 0 - NO PAIN

## 2023-10-23 ENCOUNTER — DOCUMENTATION (OUTPATIENT)
Dept: PAIN MEDICINE | Facility: HOSPITAL | Age: 88
End: 2023-10-23
Payer: MEDICARE

## 2023-10-23 DIAGNOSIS — M47.817 FACET ARTHROPATHY, LUMBOSACRAL: Primary | ICD-10-CM

## 2023-10-23 NOTE — PROGRESS NOTES
Zhanna is a 89-year-old  female who continues to have lower back pain.  She has pain to both sides but with the right worse than the left.  Back pain increases with certain activities.  Prolonged standing or walking aggravates back pain.  Doing household chores aggravates her pain.  She continues to deny pain, and numbness or tingling sensation to her legs.  She denies leg weakness or change in balance.    She takes Tylenol for pain relief.  She continues to take duloxetine.  She is taking gabapentin 100 mg at bedtime.  She continues to be on Eliquis.  She denies side effects from her medications.    Patient was scheduled for bilateral L4-L5, L5-S1 facet injection.  However this was denied by her insurance.  Patient had right L4-L5, L5-S1 RFA with Dr. Banks on 9/16/2022.  We will schedule her for bilateral L4-L5, L5-S1 RFA pending insurance approval.      Patient will need to stop Eliquis for 2 days and we have clearance from her provider.  She needs to continue taking aspirin and stop on 10/31/2023.  The office will call her for further instructions. She will receive Cipro before your procedure due to history of right total knee replacement. I will then see her for her postprocedure follow-up in 4 to 6 weeks in [place].

## 2023-10-24 ENCOUNTER — APPOINTMENT (OUTPATIENT)
Dept: PAIN MEDICINE | Facility: HOSPITAL | Age: 88
End: 2023-10-24
Payer: MEDICARE

## 2023-10-30 RX ORDER — CIPROFLOXACIN 500 MG/1
500 TABLET ORAL ONCE
Status: CANCELLED | OUTPATIENT
Start: 2023-10-30 | End: 2023-10-30

## 2023-10-31 ENCOUNTER — APPOINTMENT (OUTPATIENT)
Dept: PAIN MEDICINE | Facility: HOSPITAL | Age: 88
End: 2023-10-31
Payer: MEDICARE

## 2023-10-31 DIAGNOSIS — M46.1 SACROILIITIS, NOT ELSEWHERE CLASSIFIED (CMS-HCC): Primary | ICD-10-CM

## 2023-11-04 DIAGNOSIS — K21.9 GASTROESOPHAGEAL REFLUX DISEASE, UNSPECIFIED WHETHER ESOPHAGITIS PRESENT: ICD-10-CM

## 2023-11-04 DIAGNOSIS — E11.9 TYPE 2 DIABETES MELLITUS WITHOUT COMPLICATIONS (MULTI): ICD-10-CM

## 2023-11-06 RX ORDER — PEN NEEDLE, DIABETIC 31 GX5/16"
NEEDLE, DISPOSABLE MISCELLANEOUS
Qty: 90 EACH | Refills: 0 | Status: SHIPPED | OUTPATIENT
Start: 2023-11-06 | End: 2023-11-16 | Stop reason: SDUPTHER

## 2023-11-06 RX ORDER — OMEPRAZOLE 20 MG/1
20 CAPSULE, DELAYED RELEASE ORAL
Qty: 90 CAPSULE | Refills: 0 | Status: SHIPPED | OUTPATIENT
Start: 2023-11-06 | End: 2023-11-16 | Stop reason: SDUPTHER

## 2023-11-14 ENCOUNTER — APPOINTMENT (OUTPATIENT)
Dept: PAIN MEDICINE | Facility: HOSPITAL | Age: 88
End: 2023-11-14
Payer: MEDICARE

## 2023-11-16 ENCOUNTER — LAB (OUTPATIENT)
Dept: LAB | Facility: LAB | Age: 88
End: 2023-11-16
Payer: MEDICARE

## 2023-11-16 ENCOUNTER — OFFICE VISIT (OUTPATIENT)
Dept: PRIMARY CARE | Facility: CLINIC | Age: 88
End: 2023-11-16
Payer: MEDICARE

## 2023-11-16 VITALS
HEART RATE: 76 BPM | SYSTOLIC BLOOD PRESSURE: 160 MMHG | HEIGHT: 57 IN | OXYGEN SATURATION: 96 % | DIASTOLIC BLOOD PRESSURE: 80 MMHG | WEIGHT: 164.7 LBS | BODY MASS INDEX: 35.53 KG/M2 | TEMPERATURE: 97.6 F

## 2023-11-16 DIAGNOSIS — Z79.4 TYPE 2 DIABETES MELLITUS WITHOUT COMPLICATION, WITH LONG-TERM CURRENT USE OF INSULIN (MULTI): ICD-10-CM

## 2023-11-16 DIAGNOSIS — E03.9 HYPOTHYROIDISM, ADULT: ICD-10-CM

## 2023-11-16 DIAGNOSIS — R41.3 MEMORY DEFICIT: ICD-10-CM

## 2023-11-16 DIAGNOSIS — M54.50 BACK PAIN, LUMBOSACRAL: ICD-10-CM

## 2023-11-16 DIAGNOSIS — E11.42 DIABETIC POLYNEUROPATHY ASSOCIATED WITH TYPE 2 DIABETES MELLITUS (MULTI): ICD-10-CM

## 2023-11-16 DIAGNOSIS — E11.65 TYPE 2 DIABETES MELLITUS WITH HYPERGLYCEMIA, WITH LONG-TERM CURRENT USE OF INSULIN (MULTI): ICD-10-CM

## 2023-11-16 DIAGNOSIS — Z79.4 TYPE 2 DIABETES MELLITUS WITH HYPERGLYCEMIA, WITH LONG-TERM CURRENT USE OF INSULIN (MULTI): ICD-10-CM

## 2023-11-16 DIAGNOSIS — K21.9 GASTROESOPHAGEAL REFLUX DISEASE, UNSPECIFIED WHETHER ESOPHAGITIS PRESENT: ICD-10-CM

## 2023-11-16 DIAGNOSIS — M79.89 LEG SWELLING: ICD-10-CM

## 2023-11-16 DIAGNOSIS — M19.041 ARTHRITIS OF BOTH HANDS: ICD-10-CM

## 2023-11-16 DIAGNOSIS — Z00.00 ROUTINE GENERAL MEDICAL EXAMINATION AT HEALTH CARE FACILITY: Primary | ICD-10-CM

## 2023-11-16 DIAGNOSIS — T78.40XD ALLERGY, SUBSEQUENT ENCOUNTER: ICD-10-CM

## 2023-11-16 DIAGNOSIS — Z13.31 DEPRESSION SCREENING: ICD-10-CM

## 2023-11-16 DIAGNOSIS — M19.042 ARTHRITIS OF BOTH HANDS: ICD-10-CM

## 2023-11-16 DIAGNOSIS — E66.01 CLASS 2 SEVERE OBESITY WITH SERIOUS COMORBIDITY AND BODY MASS INDEX (BMI) OF 35.0 TO 35.9 IN ADULT, UNSPECIFIED OBESITY TYPE (MULTI): ICD-10-CM

## 2023-11-16 DIAGNOSIS — E55.9 VITAMIN D DEFICIENCY: ICD-10-CM

## 2023-11-16 DIAGNOSIS — E11.9 TYPE 2 DIABETES MELLITUS WITHOUT COMPLICATION, WITH LONG-TERM CURRENT USE OF INSULIN (MULTI): ICD-10-CM

## 2023-11-16 DIAGNOSIS — M81.0 OSTEOPOROSIS, UNSPECIFIED OSTEOPOROSIS TYPE, UNSPECIFIED PATHOLOGICAL FRACTURE PRESENCE: ICD-10-CM

## 2023-11-16 DIAGNOSIS — E11.9 TYPE 2 DIABETES MELLITUS WITHOUT COMPLICATIONS (MULTI): ICD-10-CM

## 2023-11-16 DIAGNOSIS — H35.30 MACULAR DEGENERATION OF BOTH EYES, UNSPECIFIED TYPE: ICD-10-CM

## 2023-11-16 DIAGNOSIS — F32.A DEPRESSION, UNSPECIFIED DEPRESSION TYPE: ICD-10-CM

## 2023-11-16 DIAGNOSIS — E78.00 HYPERCHOLESTEROLEMIA: ICD-10-CM

## 2023-11-16 DIAGNOSIS — I10 PRIMARY HYPERTENSION: ICD-10-CM

## 2023-11-16 DIAGNOSIS — Z23 FLU VACCINE NEED: ICD-10-CM

## 2023-11-16 DIAGNOSIS — H54.3 BLINDNESS OF BOTH EYES: ICD-10-CM

## 2023-11-16 DIAGNOSIS — Z71.89 ADVANCE DIRECTIVE DISCUSSED WITH PATIENT: ICD-10-CM

## 2023-11-16 DIAGNOSIS — R26.81 UNSTEADINESS: ICD-10-CM

## 2023-11-16 LAB
25(OH)D3 SERPL-MCNC: 33 NG/ML (ref 30–100)
ALBUMIN SERPL BCP-MCNC: 4.2 G/DL (ref 3.4–5)
ALP SERPL-CCNC: 74 U/L (ref 33–136)
ALT SERPL W P-5'-P-CCNC: 13 U/L (ref 7–45)
ANION GAP SERPL CALC-SCNC: 13 MMOL/L (ref 10–20)
AST SERPL W P-5'-P-CCNC: 16 U/L (ref 9–39)
BASOPHILS # BLD AUTO: 0.05 X10*3/UL (ref 0–0.1)
BASOPHILS NFR BLD AUTO: 0.7 %
BILIRUB SERPL-MCNC: 0.3 MG/DL (ref 0–1.2)
BUN SERPL-MCNC: 15 MG/DL (ref 6–23)
CALCIUM SERPL-MCNC: 9.8 MG/DL (ref 8.6–10.3)
CHLORIDE SERPL-SCNC: 105 MMOL/L (ref 98–107)
CHOLEST SERPL-MCNC: 145 MG/DL (ref 0–199)
CHOLESTEROL/HDL RATIO: 3.3
CO2 SERPL-SCNC: 30 MMOL/L (ref 21–32)
CREAT SERPL-MCNC: 0.84 MG/DL (ref 0.5–1.05)
CREAT UR-MCNC: 39.5 MG/DL (ref 20–320)
EOSINOPHIL # BLD AUTO: 0.14 X10*3/UL (ref 0–0.4)
EOSINOPHIL NFR BLD AUTO: 1.8 %
ERYTHROCYTE [DISTWIDTH] IN BLOOD BY AUTOMATED COUNT: 16 % (ref 11.5–14.5)
GFR SERPL CREATININE-BSD FRML MDRD: 66 ML/MIN/1.73M*2
GLUCOSE SERPL-MCNC: 104 MG/DL (ref 74–99)
HCT VFR BLD AUTO: 37.2 % (ref 36–46)
HDLC SERPL-MCNC: 44.6 MG/DL
HGB BLD-MCNC: 10.8 G/DL (ref 12–16)
IMM GRANULOCYTES # BLD AUTO: 0.03 X10*3/UL (ref 0–0.5)
IMM GRANULOCYTES NFR BLD AUTO: 0.4 % (ref 0–0.9)
LDLC SERPL CALC-MCNC: 53 MG/DL
LYMPHOCYTES # BLD AUTO: 1.85 X10*3/UL (ref 0.8–3)
LYMPHOCYTES NFR BLD AUTO: 24.3 %
MCH RBC QN AUTO: 25.3 PG (ref 26–34)
MCHC RBC AUTO-ENTMCNC: 29 G/DL (ref 32–36)
MCV RBC AUTO: 87 FL (ref 80–100)
MICROALBUMIN UR-MCNC: 24.6 MG/L
MICROALBUMIN/CREAT UR: 62.3 UG/MG CREAT
MONOCYTES # BLD AUTO: 0.65 X10*3/UL (ref 0.05–0.8)
MONOCYTES NFR BLD AUTO: 8.5 %
NEUTROPHILS # BLD AUTO: 4.89 X10*3/UL (ref 1.6–5.5)
NEUTROPHILS NFR BLD AUTO: 64.3 %
NON HDL CHOLESTEROL: 100 MG/DL (ref 0–149)
NRBC BLD-RTO: 0 /100 WBCS (ref 0–0)
PLATELET # BLD AUTO: 305 X10*3/UL (ref 150–450)
POC FINGERSTICK BLOOD GLUCOSE: 123 MG/DL (ref 70–100)
POC HEMOGLOBIN A1C: 8.1 % (ref 4.2–6.5)
POTASSIUM SERPL-SCNC: 4.6 MMOL/L (ref 3.5–5.3)
PROT SERPL-MCNC: 6.7 G/DL (ref 6.4–8.2)
RBC # BLD AUTO: 4.27 X10*6/UL (ref 4–5.2)
SODIUM SERPL-SCNC: 143 MMOL/L (ref 136–145)
TRIGL SERPL-MCNC: 236 MG/DL (ref 0–149)
TSH SERPL-ACNC: 2.75 MIU/L (ref 0.44–3.98)
VLDL: 47 MG/DL (ref 0–40)
WBC # BLD AUTO: 7.6 X10*3/UL (ref 4.4–11.3)

## 2023-11-16 PROCEDURE — 82306 VITAMIN D 25 HYDROXY: CPT

## 2023-11-16 PROCEDURE — G0439 PPPS, SUBSEQ VISIT: HCPCS | Performed by: NURSE PRACTITIONER

## 2023-11-16 PROCEDURE — 1170F FXNL STATUS ASSESSED: CPT | Performed by: NURSE PRACTITIONER

## 2023-11-16 PROCEDURE — 1123F ACP DISCUSS/DSCN MKR DOCD: CPT | Performed by: NURSE PRACTITIONER

## 2023-11-16 PROCEDURE — 83036 HEMOGLOBIN GLYCOSYLATED A1C: CPT | Performed by: NURSE PRACTITIONER

## 2023-11-16 PROCEDURE — G0444 DEPRESSION SCREEN ANNUAL: HCPCS | Performed by: NURSE PRACTITIONER

## 2023-11-16 PROCEDURE — 82570 ASSAY OF URINE CREATININE: CPT

## 2023-11-16 PROCEDURE — 99214 OFFICE O/P EST MOD 30 MIN: CPT | Performed by: NURSE PRACTITIONER

## 2023-11-16 PROCEDURE — 3077F SYST BP >= 140 MM HG: CPT | Performed by: NURSE PRACTITIONER

## 2023-11-16 PROCEDURE — 36415 COLL VENOUS BLD VENIPUNCTURE: CPT

## 2023-11-16 PROCEDURE — 90662 IIV NO PRSV INCREASED AG IM: CPT | Performed by: NURSE PRACTITIONER

## 2023-11-16 PROCEDURE — G0447 BEHAVIOR COUNSEL OBESITY 15M: HCPCS | Performed by: NURSE PRACTITIONER

## 2023-11-16 PROCEDURE — G0008 ADMIN INFLUENZA VIRUS VAC: HCPCS | Performed by: NURSE PRACTITIONER

## 2023-11-16 PROCEDURE — 1125F AMNT PAIN NOTED PAIN PRSNT: CPT | Performed by: NURSE PRACTITIONER

## 2023-11-16 PROCEDURE — 3079F DIAST BP 80-89 MM HG: CPT | Performed by: NURSE PRACTITIONER

## 2023-11-16 PROCEDURE — 82962 GLUCOSE BLOOD TEST: CPT | Performed by: NURSE PRACTITIONER

## 2023-11-16 PROCEDURE — 1159F MED LIST DOCD IN RCRD: CPT | Performed by: NURSE PRACTITIONER

## 2023-11-16 PROCEDURE — 82043 UR ALBUMIN QUANTITATIVE: CPT

## 2023-11-16 PROCEDURE — 1160F RVW MEDS BY RX/DR IN RCRD: CPT | Performed by: NURSE PRACTITIONER

## 2023-11-16 PROCEDURE — 1036F TOBACCO NON-USER: CPT | Performed by: NURSE PRACTITIONER

## 2023-11-16 RX ORDER — DULOXETIN HYDROCHLORIDE 30 MG/1
30 CAPSULE, DELAYED RELEASE ORAL DAILY
Qty: 90 CAPSULE | Refills: 0 | Status: SHIPPED | OUTPATIENT
Start: 2023-11-16 | End: 2024-02-12 | Stop reason: SDUPTHER

## 2023-11-16 RX ORDER — FOLIC ACID 1 MG/1
1 TABLET ORAL DAILY
Qty: 90 TABLET | Refills: 0 | Status: SHIPPED | OUTPATIENT
Start: 2023-11-16 | End: 2024-02-12 | Stop reason: SDUPTHER

## 2023-11-16 RX ORDER — LEVOTHYROXINE SODIUM 25 UG/1
25 TABLET ORAL DAILY
Qty: 90 TABLET | Refills: 0 | Status: SHIPPED | OUTPATIENT
Start: 2023-11-16 | End: 2024-02-12 | Stop reason: SDUPTHER

## 2023-11-16 RX ORDER — VIT C/E/ZN/COPPR/LUTEIN/ZEAXAN 250MG-90MG
25 CAPSULE ORAL DAILY
Qty: 90 CAPSULE | Refills: 0 | Status: SHIPPED | OUTPATIENT
Start: 2023-11-16 | End: 2024-02-12 | Stop reason: SDUPTHER

## 2023-11-16 RX ORDER — FUROSEMIDE 20 MG/1
TABLET ORAL
Qty: 90 TABLET | Refills: 0 | Status: SHIPPED | OUTPATIENT
Start: 2023-11-16 | End: 2023-11-27 | Stop reason: ALTCHOICE

## 2023-11-16 RX ORDER — ROSUVASTATIN CALCIUM 20 MG/1
20 TABLET, COATED ORAL DAILY
Qty: 90 TABLET | Refills: 0 | Status: SHIPPED | OUTPATIENT
Start: 2023-11-16 | End: 2024-02-12 | Stop reason: SDUPTHER

## 2023-11-16 RX ORDER — INSULIN GLARGINE 100 [IU]/ML
30 INJECTION, SOLUTION SUBCUTANEOUS NIGHTLY
Qty: 30 ML | Refills: 0 | Status: SHIPPED | OUTPATIENT
Start: 2023-11-16 | End: 2024-02-12 | Stop reason: SDUPTHER

## 2023-11-16 RX ORDER — GLIMEPIRIDE 2 MG/1
2 TABLET ORAL 2 TIMES DAILY
Qty: 180 TABLET | Refills: 0 | Status: SHIPPED | OUTPATIENT
Start: 2023-11-16 | End: 2024-02-12 | Stop reason: SDUPTHER

## 2023-11-16 RX ORDER — ALENDRONATE SODIUM 70 MG/1
70 TABLET ORAL
Qty: 12 TABLET | Refills: 0 | Status: SHIPPED | OUTPATIENT
Start: 2023-11-16 | End: 2024-02-12 | Stop reason: SDUPTHER

## 2023-11-16 RX ORDER — BLOOD-GLUCOSE METER
EACH MISCELLANEOUS
Qty: 100 EACH | Refills: 0 | Status: SHIPPED | OUTPATIENT
Start: 2023-11-16 | End: 2024-02-12 | Stop reason: SDUPTHER

## 2023-11-16 RX ORDER — PEN NEEDLE, DIABETIC 30 GX3/16"
NEEDLE, DISPOSABLE MISCELLANEOUS
Qty: 90 EACH | Refills: 0 | Status: SHIPPED | OUTPATIENT
Start: 2023-11-16 | End: 2024-02-12 | Stop reason: SDUPTHER

## 2023-11-16 RX ORDER — OMEPRAZOLE 20 MG/1
20 CAPSULE, DELAYED RELEASE ORAL
Qty: 90 CAPSULE | Refills: 0 | Status: SHIPPED | OUTPATIENT
Start: 2023-11-16 | End: 2024-02-12 | Stop reason: SDUPTHER

## 2023-11-16 RX ORDER — LISINOPRIL 40 MG/1
40 TABLET ORAL DAILY
Qty: 90 TABLET | Refills: 0 | Status: SHIPPED | OUTPATIENT
Start: 2023-11-16 | End: 2024-02-12 | Stop reason: SDUPTHER

## 2023-11-16 ASSESSMENT — ACTIVITIES OF DAILY LIVING (ADL)
DOING_HOUSEWORK: NEEDS ASSISTANCE
GROCERY_SHOPPING: NEEDS ASSISTANCE
MANAGING_FINANCES: TOTAL CARE
BATHING: INDEPENDENT
TAKING_MEDICATION: NEEDS ASSISTANCE
DRESSING: INDEPENDENT

## 2023-11-16 ASSESSMENT — PATIENT HEALTH QUESTIONNAIRE - PHQ9
1. LITTLE INTEREST OR PLEASURE IN DOING THINGS: NOT AT ALL
2. FEELING DOWN, DEPRESSED OR HOPELESS: MORE THAN HALF THE DAYS
SUM OF ALL RESPONSES TO PHQ9 QUESTIONS 1 AND 2: 2
10. IF YOU CHECKED OFF ANY PROBLEMS, HOW DIFFICULT HAVE THESE PROBLEMS MADE IT FOR YOU TO DO YOUR WORK, TAKE CARE OF THINGS AT HOME, OR GET ALONG WITH OTHER PEOPLE: NOT DIFFICULT AT ALL

## 2023-11-16 ASSESSMENT — ENCOUNTER SYMPTOMS
LOSS OF SENSATION IN FEET: 0
OCCASIONAL FEELINGS OF UNSTEADINESS: 1
DEPRESSION: 1

## 2023-11-16 NOTE — PATIENT INSTRUCTIONS
Recommend RV vaccine       Ways to Help Prevent Falls at Home    Quick Tips   ? Ask for help if you need it. Most people want to help!   ? Get up slowly after sitting or laying down   ? Wear a medical alert device or keep cell phone in your pocket   ? Use night lights, especially areas near a bathroom   ? Keep the items you use often within reach on a small stool or end table   ? Use an assistive device such as walker or cane, as directed by provider/physical therapy   ? Use a non-slip mat and grab bars in your bathroom. Look for home health sections for best options     Other Areas to Focus On   ? Exercise and nutrition: Regular exercise or taking a falls prevention class are great ways improve strength and balance. Don’t forget to stay hydrated and bring a snack!   ? Medicine side effects: Some medicines can make you sleepy or dizzy, which could cause a fall. Ask your healthcare provider about the side effects your medicines could cause. Be sure to let them know if you take any vitamins or supplements as well.   ? Tripping hazards: Remove items you could trip on, such as loose mats, rugs, cords, and clutter. Wear closed toe shoes with rubber soles.   ? Health and wellness: Get regular checkups with your healthcare provider, plus routine vision and hearing screenings. Talk with your healthcare provider about:   o Your medicines and the possible side effects - bring them in a bag if that is easier!   o Problems with balance or feeling dizzy   o Ways to promote bone health, such as Vitamin D and calcium supplements   o Questions or concerns about falling     *Ask your healthcare team if you have questions     [facility], 2022

## 2023-11-16 NOTE — PROGRESS NOTES
Subjective   Reason for Visit: Zhanna Perez is an 89 y.o. female here for a Medicare Wellness visit.     Past Medical, Surgical, and Family History reviewed and updated in chart.    Reviewed all medications by prescribing practitioner or clinical pharmacist (such as prescriptions, OTCs, herbal therapies and supplements) and documented in the medical record.    Medicare physical  Patient will be moving to the [facility].  Medical evaluation/provider form completed today.  Bilateral lower leg edema improved with furosemide and stopping amlodipine.  Educated regarding low-sodium diet, elevating legs throughout the day, compression socks.  Diabetes, not controlled, improving A1c 8.1%, taking Lantus to 30 units at bedtime, glimepiride 4 mg twice a day. Continue to check blood sugar daily and bring record to next visit.   GERD, controlled with omeprazole 20 mg every morning.   Bilateral pulmonary emboli in August 2021. Patient saw hematology, further testing for coagulopathy was done. Hematology does not believe she has thrombophilia. Believes PE was provoked related to spinal stenosis and decreased mobility. Recommends staying on Eliquis long-term due to her risk of recurrent immobility. Hematology cleared her for spinal injections with holding Eliquis for 3 days prior to the procedure and resuming the day after.   Lumbar spondylosis, lumbar radiculitis.  Following with pain management, receiving injections.  Hypertension, not controlled, increase lisinopril to 40 mg daily.  Osteoporosis, taking alendronate 70 mg weekly. Taking Os-João daily. Vitamin D deficiency taking vitamin D 25 mcg daily.  DEXA due 2024.  Dyslipidemia, taking rosuvastatin 20 mg daily.  Levothyroxine, controlled, taking levothyroxine 25 mcg daily.  Taking Cymbalta 30 mg daily for arthritis and depression, controlled  Due for complete labs, plans to complete this morning  I spent 15 minutes obtaining and discussing  depression screening using PHQ-2 questions with results documented in the chart.  I spent greater than 15 minutes face-to-face with individual providing recommendations for nutrition choices and exercise plan to help achieve weight reduction.  I spent greater than 16 minutes discussing advance care planning including the explanation and discussion of advanced directives. If patient does not have current up to date documents, examples and information provided on how to create both Living Will and Power of . Patient was encouraged to work on completing these documents. Patient requests DNR [employer]. [place] DNR order form completed.  Discussed influenza vaccine, risks and benefits. VIS 08/06/2021 given to patient. Patient consents to flu vaccine. Flu vaccine given today.    Preventive:  CRC screen: Aged out  Mammogram: 10/2022 negative, declines  DEXA scan: 11/2022 osteoporosis   Ophthalmology: UTD  Podiatry: UTD  Urine albumin: 10/2022    Results for orders placed or performed in visit on 11/16/23 (from the past 96 hour(s))  -POCT fingerstick glucose manually resulted:        Result                      Value             Ref Range           POC Fingerstick Blood *     123 (A)           70 - 100 mg/*  -POCT glycosylated hemoglobin (Hb A1C) manually resulted:        Result                      Value             Ref Range           POC HEMOGLOBIN A1c          8.1 (A)           4.2 - 6.5 %           Patient Care Team:  HUDSON Lopes DNP as PCP - General  HUDSON Lopes DNP as PCP - [facility] Attributed Provider     Review of Systems   Constitutional:  Negative for activity change.   HENT:  Negative for rhinorrhea, sinus pressure and sinus pain.    Eyes:  Positive for visual disturbance.   Respiratory:  Negative for chest tightness, shortness of breath and wheezing.    Cardiovascular:  Positive for leg swelling. Negative for palpitations.   Gastrointestinal:  Negative for abdominal  "distention, constipation and diarrhea.   Endocrine: Negative.    Genitourinary: Negative.    Allergic/Immunologic: Negative.    Neurological:  Negative for dizziness, syncope and light-headedness.   Hematological: Negative.    Psychiatric/Behavioral: Negative.     All other systems reviewed and are negative.      Objective   Vitals:  /80   Pulse 76   Temp 36.4 °C (97.6 °F)   Ht 1.448 m (4' 9\")   Wt 74.7 kg (164 lb 11.2 oz)   SpO2 96%   BMI 35.64 kg/m²       Physical Exam  Vitals and nursing note reviewed.   Constitutional:       General: She is not in acute distress.     Appearance: Normal appearance. She is obese. She is not ill-appearing.   HENT:      Head: Normocephalic and atraumatic.      Right Ear: Tympanic membrane, ear canal and external ear normal.      Left Ear: Tympanic membrane, ear canal and external ear normal.      Nose: Nose normal.      Mouth/Throat:      Mouth: Mucous membranes are moist.      Pharynx: Oropharynx is clear.   Eyes:      Pupils: Pupils are equal, round, and reactive to light.   Cardiovascular:      Rate and Rhythm: Normal rate and regular rhythm.      Pulses: Normal pulses.      Heart sounds: Normal heart sounds. No murmur heard.  Pulmonary:      Effort: Pulmonary effort is normal. No respiratory distress.      Breath sounds: Normal breath sounds. No wheezing.   Abdominal:      General: Bowel sounds are normal. There is no distension.      Palpations: Abdomen is soft.      Tenderness: There is no abdominal tenderness.   Musculoskeletal:         General: Normal range of motion.      Left wrist: Swelling and tenderness present.      Cervical back: Normal range of motion and neck supple.      Right lower le+ Edema present.      Left lower le+ Edema present.      Comments: Uses walker   Skin:     General: Skin is warm and dry.      Capillary Refill: Capillary refill takes less than 2 seconds.      Coloration: Skin is not jaundiced.   Neurological:      General: No " focal deficit present.      Mental Status: She is alert and oriented to person, place, and time.      Motor: No weakness.   Psychiatric:         Mood and Affect: Mood normal.         Behavior: Behavior normal.         Thought Content: Thought content normal.         Judgment: Judgment normal.         Assessment/Plan     # Hypertension, not controlled  -Increase lisinopril to 40 mg daily  -Low fat/cholesterol, low sodium, low carbohydrate diet  -Exercise as tolerated 150 minutes/week, increase activity slowly  -Weight loss  #Physical deconditioning  -Needs home health care for PT  # Diabetes, not controlled, A1c 8.1%  -Continue glimepiride 4 mg 2 x daily and increase Lantus to 30 units at bedtime  -Check FBS daily, bring record to next visit; check BS as needed for low BS or other concerns  -Low fat/cholesterol, low sodium, carbohydrate controlled diet  # Bilateral PEs  -Continue Eliquis 5 mg twice a day  -follow with hematology as needed  # Osteoporosis  -continue alendronate 70 mg weekly  -weight bearing exercises  -DEXA due 2024  # Lumbar stenosis, back pain, Neuropathy  -Patient may proceed with injection after holding Eliquis for 3 days  -follow with pain management  # Macular degeneration, legally blind  -follow with opthalmology  # Depression, chronic musculoskeletal pain, controlled  -Continue Cymbalta 30 mg daily  -Needs PT  # Hypothyroidism  -Continue levothyroxine 25 mcg daily  -monitor TSH  # Obesity, BMI 35  -Avoid sweets and sugary drinks  -Drink plenty of water  -Avoid processed foods and refined foods  -Eat fruits, vegetables, lean protein, whole grains  -Increase your activity level  # GERD, improved  -Continue omeprazole 20 mg daily  -avoid alcohol, caffeine, acidic foods, other foods that tend to bother your stomach  -Elevate head of bed 4 to 6 inches on blocks  -Avoid eating 2 to 3 hours prior to bedtime  -Do not lay down after eating  -Do not wear constricting clothing around your  middle    Follow-up 3 months and as needed  Patient was identified as a fall risk. Risk prevention instructions provided.

## 2023-11-17 DIAGNOSIS — R41.3 MEMORY DEFICIT: Primary | ICD-10-CM

## 2023-11-17 ASSESSMENT — ENCOUNTER SYMPTOMS
ABDOMINAL DISTENTION: 0
CHEST TIGHTNESS: 0
DIARRHEA: 0
ALLERGIC/IMMUNOLOGIC NEGATIVE: 1
DIZZINESS: 0
SINUS PRESSURE: 0
HEMATOLOGIC/LYMPHATIC NEGATIVE: 1
PSYCHIATRIC NEGATIVE: 1
RHINORRHEA: 0
SINUS PAIN: 0
WHEEZING: 0
CONSTIPATION: 0
ENDOCRINE NEGATIVE: 1
PALPITATIONS: 0
ACTIVITY CHANGE: 0
SHORTNESS OF BREATH: 0
LIGHT-HEADEDNESS: 0

## 2023-11-21 ENCOUNTER — APPOINTMENT (OUTPATIENT)
Dept: PAIN MEDICINE | Facility: HOSPITAL | Age: 88
End: 2023-11-21
Payer: MEDICARE

## 2023-11-27 ENCOUNTER — TELEPHONE (OUTPATIENT)
Dept: PAIN MEDICINE | Facility: HOSPITAL | Age: 88
End: 2023-11-27
Payer: MEDICARE

## 2023-11-27 ENCOUNTER — APPOINTMENT (OUTPATIENT)
Dept: PAIN MEDICINE | Facility: HOSPITAL | Age: 88
End: 2023-11-27
Payer: MEDICARE

## 2023-11-27 DIAGNOSIS — R42 VERTIGO: ICD-10-CM

## 2023-11-27 DIAGNOSIS — R11.0 NAUSEA: Primary | ICD-10-CM

## 2023-11-27 RX ORDER — ONDANSETRON 4 MG/1
4 TABLET, ORALLY DISINTEGRATING ORAL EVERY 8 HOURS PRN
Qty: 20 TABLET | Refills: 0 | COMMUNITY
Start: 2023-11-27 | End: 2024-02-12 | Stop reason: WASHOUT

## 2023-11-27 RX ORDER — MECLIZINE HYDROCHLORIDE 25 MG/1
25 TABLET ORAL 3 TIMES DAILY PRN
Qty: 30 TABLET | Refills: 0 | Status: SHIPPED | OUTPATIENT
Start: 2023-11-27 | End: 2024-02-12 | Stop reason: SDUPTHER

## 2023-12-01 ENCOUNTER — APPOINTMENT (OUTPATIENT)
Dept: PAIN MEDICINE | Facility: HOSPITAL | Age: 88
End: 2023-12-01
Payer: MEDICARE

## 2023-12-04 ENCOUNTER — OFFICE VISIT (OUTPATIENT)
Dept: PAIN MEDICINE | Facility: HOSPITAL | Age: 88
End: 2023-12-04
Payer: MEDICARE

## 2023-12-04 ENCOUNTER — HOSPITAL ENCOUNTER (OUTPATIENT)
Dept: RADIOLOGY | Facility: HOSPITAL | Age: 88
Discharge: HOME | End: 2023-12-04
Payer: MEDICARE

## 2023-12-04 VITALS
WEIGHT: 156 LBS | TEMPERATURE: 97.9 F | DIASTOLIC BLOOD PRESSURE: 76 MMHG | SYSTOLIC BLOOD PRESSURE: 162 MMHG | BODY MASS INDEX: 33.66 KG/M2 | HEART RATE: 77 BPM | HEIGHT: 57 IN

## 2023-12-04 DIAGNOSIS — M46.1 SACROILIITIS (CMS-HCC): ICD-10-CM

## 2023-12-04 DIAGNOSIS — Z79.899 MEDICATION MANAGEMENT: ICD-10-CM

## 2023-12-04 DIAGNOSIS — M47.817 FACET ARTHROPATHY, LUMBOSACRAL: Primary | ICD-10-CM

## 2023-12-04 DIAGNOSIS — R05.1 ACUTE COUGH: ICD-10-CM

## 2023-12-04 DIAGNOSIS — M54.50 CHRONIC BILATERAL LOW BACK PAIN WITHOUT SCIATICA: ICD-10-CM

## 2023-12-04 DIAGNOSIS — G89.29 CHRONIC BILATERAL LOW BACK PAIN WITHOUT SCIATICA: ICD-10-CM

## 2023-12-04 LAB
AMPHETAMINES UR QL SCN: NORMAL
BARBITURATES UR QL SCN: NORMAL
BENZODIAZ UR QL SCN: NORMAL
BZE UR QL SCN: NORMAL
CANNABINOIDS UR QL SCN: NORMAL
FENTANYL+NORFENTANYL UR QL SCN: NORMAL
OPIATES UR QL SCN: NORMAL
OXYCODONE+OXYMORPHONE UR QL SCN: NORMAL
PCP UR QL SCN: NORMAL

## 2023-12-04 PROCEDURE — 71046 X-RAY EXAM CHEST 2 VIEWS: CPT | Performed by: RADIOLOGY

## 2023-12-04 PROCEDURE — 1160F RVW MEDS BY RX/DR IN RCRD: CPT | Performed by: NURSE PRACTITIONER

## 2023-12-04 PROCEDURE — 99214 OFFICE O/P EST MOD 30 MIN: CPT | Performed by: NURSE PRACTITIONER

## 2023-12-04 PROCEDURE — 1125F AMNT PAIN NOTED PAIN PRSNT: CPT | Performed by: NURSE PRACTITIONER

## 2023-12-04 PROCEDURE — 71046 X-RAY EXAM CHEST 2 VIEWS: CPT | Mod: FY

## 2023-12-04 PROCEDURE — 1036F TOBACCO NON-USER: CPT | Performed by: NURSE PRACTITIONER

## 2023-12-04 PROCEDURE — 99214 OFFICE O/P EST MOD 30 MIN: CPT | Mod: ZK | Performed by: NURSE PRACTITIONER

## 2023-12-04 PROCEDURE — 3078F DIAST BP <80 MM HG: CPT | Performed by: NURSE PRACTITIONER

## 2023-12-04 PROCEDURE — 80307 DRUG TEST PRSMV CHEM ANLYZR: CPT | Performed by: NURSE PRACTITIONER

## 2023-12-04 PROCEDURE — 1159F MED LIST DOCD IN RCRD: CPT | Performed by: NURSE PRACTITIONER

## 2023-12-04 PROCEDURE — 3077F SYST BP >= 140 MM HG: CPT | Performed by: NURSE PRACTITIONER

## 2023-12-04 ASSESSMENT — ENCOUNTER SYMPTOMS
CONSTITUTIONAL NEGATIVE: 1
JOINT SWELLING: 0
PSYCHIATRIC NEGATIVE: 1
CARDIOVASCULAR NEGATIVE: 1
MYALGIAS: 1
RESPIRATORY NEGATIVE: 1
GASTROINTESTINAL NEGATIVE: 1
NECK STIFFNESS: 0
NECK PAIN: 0
NEUROLOGICAL NEGATIVE: 1
EYES NEGATIVE: 1
ARTHRALGIAS: 1
ALLERGIC/IMMUNOLOGIC NEGATIVE: 1
BACK PAIN: 1
ENDOCRINE NEGATIVE: 1

## 2023-12-04 ASSESSMENT — PAIN SCALES - GENERAL: PAINLEVEL: 8

## 2023-12-04 NOTE — PROGRESS NOTES
I have personally reviewed the [place] report for Zhanna Perez   . I have considered the risks of abuse, dependence, addiction and diversion.   Is the patient prescribed a combination of a benzodiazepine and opioid? No  Patient tolerating without AE. Benefit outweighs the risk. Discussed risks/benefits with patient. All questions answered. States understanding.     Date of the last Controlled Substance Agreement: 12/4/2023    Last urine drug screening date/ordered today: 12/04/23  Results of last screen: Results as expected.     OPIOID   What is the patient’s goal of therapy? PAIN CONTROL.  Is this being achieved with current treatment? Yes  Attestation statement: I feel that it is clinically indicated to continue this current medication regimen after consideration of alternative therapies, and other non-opioid treatments.     Opioid Risk Screening:   THE OPIOID RISK TOOL (ORT)                               Female                     Male    Alcohol                            [1] = 0                         [3] =   Illegal Drugs                           [2] =  0                         [3]  =     1. Family History of Substance Abuse Prescription Drugs                           [4]=   0                        [4]  =   Alcohol                           [3] =  0                        [3]   =  Illicit Drugs                           [4]=    0                       [4]   =    2. Personal History of Substance Abuse Prescription Drugs                          [5]=0                            [5]   =    3. Age (If between 16 to 45)                          [1]=  0                         [1]   =    4. History of Preadolescent Sexual Abuse                         [3]=  00                          [0]   =    ADD, OCD, Bipolar, Schizophrenia                         [2]=   0                         [2]   =    5. Psychological Disease Depression                        [1]=    1                         [1]   =    TOTAL Score  =  1     Last opioid risk screening date/ordered today: 12/4/2023  Patient's total score is 1    Reference :  Low Score = 0 to 3  Moderate Score = 4 to 7  High Score = =8       Pain Scale Screening:   Pain Assessment and Documentation Tool (PADT)   Date of Assessment: 12/4/2023  Analgesia:   Patient reports her pain level on average during the past week is 8on a 0 - 10 scale.   Patient reports that her pain level at its worst during the past week was 8 on a 0 -10 scale.   50% of pain has been relieved during the past week per patient   Patient states that the amount of pain relief she is now obtaining from her current pain reliever(s) is enough to make a real difference in her life.     Activities of Daily Living:   Physical functioning: worse  Family relationships: worse  Social relationships: worse  Mood: worse  Sleep patterns: worse  Overall functioning: worse  Adverse Events: Zhanna Smith is not experiencing side effects from current pain reliever.  Patients overall severity of side effect:none  Specific Analgesic Plan: Continue present regimen.

## 2023-12-04 NOTE — PROGRESS NOTES
"Subjective   Patient ID: Zhanna Perez \"Juan Alberto\" is a 89 y.o. female who presents for Back Pain (Lower back pain ).    Zhanna is a pleasant 89-year-old  female who is here for postprocedure follow-up.  Patient presents in a wheelchair.  She arrives with her daughter.    Patient had right SI joint injection done on 10/10/2023.  The injection has improved her back pain, leg pain and hip pain.  It provided 80% relief for 3 weeks.  Patient reports that the injection relief is now at 50%.  The injection has improved some of her pain and her ability to participate in her daily activities.    Patient continues to have chronic lower back pain.  Pain is prominent on the right with minimal pain to the left.  She rates her pain as 7-8 out of 10.  It is constant.  She describes it as sharp.   She reports since she missed several appointments that she has more pain to her back.  Back pain interferes with her physical functioning.  Patient is not able to straighten her back due to the pain. Patient now lives at the [facility]. She denies pain, numbness or tingling sensation to her legs.  She denies increasing leg weakness or change in balance.  She denies recent injury.    Patient has occasional moist cough during this visit.  Per daughter that she has been coughing for a week.  She is not on any medication.  I discussed alternative treatment for her cough.  We will also do chest x-ray.    Patient continues to take duloxetine prescribed by her PCP.  She is on Eliquis.  She denies side effects from her medications.      Patient had right L4-L5, L5-S1 RFA on 9/16/2022 with Dr. Banks.  She reports she received more than 80% relief for a year.  I discussed the plan of care including pharmacologic and joint interventional procedure.  I discussed right-sided lumbar RFA.  I also discussed medial branch block for the left side.  Patient is in agreement to proceed with RFA.  This is done under " fluoroscopy.  We will address the left medial branch block with her follow-up visit.  Questions were answered during this encounter.     HEMAL was done today and she scored 38.  The form was scanned and included in this note.    -------------  10/10/2023: Right SI injection  9/16/2022: Right L4-L5, L5-S1 RFA with Dr. Banks  -------        Past Medical History  She has a past medical history of Age-related cognitive decline (03/31/2015), Body mass index (BMI) 32.0-32.9, adult (10/28/2020), Bursitis of right shoulder (08/21/2018), Encounter for general adult medical examination without abnormal findings (04/01/2016), Encounter for other preprocedural examination (08/30/2016), Erythematous condition, unspecified (05/09/2018), Mastitis without abscess (05/09/2018), Mastodynia (05/09/2018), Mastodynia (05/09/2018), Obesity, unspecified (08/02/2021), Obesity, unspecified (10/28/2020), Obesity, unspecified (09/15/2021), Obesity, unspecified (05/03/2021), Occipital neuralgia (05/12/2016), Other conditions influencing health status (01/28/2020), Other conditions influencing health status (04/07/2017), Pain in right shoulder (07/05/2018), Pain in unspecified hip (12/29/2014), Personal history of other diseases of the female genital tract (05/09/2018), Personal history of other diseases of the musculoskeletal system and connective tissue, Personal history of other diseases of the nervous system and sense organs (08/23/2017), Personal history of other diseases of the nervous system and sense organs (03/27/2014), Personal history of other diseases of the respiratory system (06/27/2016), Personal history of other diseases of the respiratory system (06/19/2020), Personal history of other infectious and parasitic diseases (02/25/2016), Trigger finger, right middle finger (03/19/2020), Trochanteric bursitis, left hip (06/03/2019), 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    Surgical History  Past Surgical History:   Procedure Laterality Date    BREAST SURGERY  03/02/2015    Breast Surgery    EYE SURGERY  03/02/2015    Eye Surgery    JOINT REPLACEMENT Right     knee replacement    KNEE ARTHROSCOPY W/ DEBRIDEMENT  04/30/2013    Knee Arthroscopy (Therapeutic)    MR NECK ANGIO WO IV CONTRAST  02/26/2016    MR NECK ANGIO WO IV CONTRAST 2/26/2016 [facility]    OTHER SURGICAL HISTORY  04/30/2013    Supracervical Hysterectomy Laparoscopic Uterus 250g Or Less    TONSILLECTOMY  04/30/2013    Tonsillectomy        Social History  She reports that she quit smoking about 72 years ago. Her smoking use included cigarettes. She has never used smokeless tobacco. She reports that she does not drink alcohol and does not use drugs.    Family History  Family History   Problem Relation Name Age of Onset    Brain cancer Father      Coronary artery disease Brother          Allergies  Codeine, Darvocet a500 [propoxyphene n-acetaminophen], Dilaudid [hydromorphone], Gabapentin, and Propoxyphene-acetaminophen      Current Outpatient Medications:     acetaminophen (TYLENOL ORAL), Take by mouth., Disp: , Rfl:     alendronate (Fosamax) 70 mg tablet, Take 1 tablet (70 mg) by mouth every 7 days. IN AM W/ 6-8OZ PLAIN WATER. DO NOT EAT/LIE DOWN FOR 30 MIN AFTER, Disp: 12 tablet, Rfl: 0    apixaban (Eliquis) 5 mg tablet, Take 1 tablet (5 mg) by mouth 2 times a day., Disp: 180 tablet, Rfl: 0    blood pressure monitor kit, Use to check BP daily and as needed, Disp: 1 kit, Rfl: 0    blood sugar diagnostic (Premier Test Strip) strip, TEST BLOOD SUGARonce daily. E11.9, Disp: 100 each, Rfl: 0    cholecalciferol (Vitamin D-3) 25 MCG (1000 UT) capsule, Take 1 capsule (25 mcg) by mouth once daily., Disp: 90 capsule, Rfl: 0    DULoxetine (Cymbalta) 30 mg DR capsule, Take 1 capsule (30 mg) by mouth once daily., Disp: 90  "capsule, Rfl: 0    fluticasone (Flonase) 50 mcg/actuation nasal spray, SPRAY 2 SPRAYS INTO EACH NOSTRIL EVERY DAY, Disp: 48 mL, Rfl: 1    folic acid (Folvite) 1 mg tablet, Take 1 tablet (1 mg) by mouth once daily., Disp: 90 tablet, Rfl: 0    FreeStyle glucose monitoring kit, 1 each if needed., Disp: , Rfl:     glimepiride (Amaryl) 2 mg tablet, Take 1 tablet (2 mg) by mouth 2 times a day., Disp: 180 tablet, Rfl: 0    insulin glargine (Lantus Solostar U-100 Insulin) 100 unit/mL (3 mL) pen, Inject 30 Units under the skin once daily at bedtime. Take as directed per insulin instructions., Disp: 30 mL, Rfl: 0    levothyroxine (Synthroid, Levoxyl) 25 mcg tablet, Take 1 tablet (25 mcg) by mouth once daily., Disp: 90 tablet, Rfl: 0    lisinopril 40 mg tablet, Take 1 tablet (40 mg) by mouth once daily., Disp: 90 tablet, Rfl: 0    meclizine (Antivert) 25 mg tablet, Take 1 tablet (25 mg) by mouth 3 times a day as needed for dizziness., Disp: 30 tablet, Rfl: 0    omeprazole (PriLOSEC) 20 mg DR capsule, Take 1 capsule (20 mg) by mouth once daily in the morning. Take before meals. Do not crush or chew., Disp: 90 capsule, Rfl: 0    ondansetron ODT (Zofran-ODT) 4 mg disintegrating tablet, Take 1 tablet (4 mg) by mouth every 8 hours if needed for nausea or vomiting., Disp: 20 tablet, Rfl: 0    rosuvastatin (Crestor) 20 mg tablet, Take 1 tablet (20 mg) by mouth once daily., Disp: 90 tablet, Rfl: 0    pen needle, diabetic (BD Ultra-Fine Mini Pen Needle) 31 gauge x 3/16\" needle, INJECT 1 EACH UNDER THE SKIN ONCE DAILY. USE AS INSTRUCTED. USE TO INJECT INSULIN, Disp: 90 each, Rfl: 0     Review of Systems   Constitutional: Negative.    HENT: Negative.     Eyes: Negative.    Respiratory: Negative.     Cardiovascular: Negative.    Gastrointestinal: Negative.    Endocrine: Negative.    Genitourinary: Negative.    Musculoskeletal:  Positive for arthralgias, back pain and myalgias. Negative for gait problem, joint swelling, neck pain and neck " stiffness.   Skin: Negative.    Allergic/Immunologic: Negative.    Neurological: Negative.    Psychiatric/Behavioral: Negative.          Physical Exam  Vitals and nursing note reviewed.   HENT:      Head: Normocephalic.      Nose: Nose normal.   Eyes:      Extraocular Movements: Extraocular movements intact.      Conjunctiva/sclera: Conjunctivae normal.      Pupils: Pupils are equal, round, and reactive to light.   Cardiovascular:      Rate and Rhythm: Normal rate and regular rhythm.   Pulmonary:      Effort: Pulmonary effort is normal.      Breath sounds: Normal breath sounds.   Musculoskeletal:         General: Tenderness present. No swelling, deformity or signs of injury.      Cervical back: No rigidity or tenderness.      Lumbar back: Spasms and tenderness present.      Right lower leg: No edema.      Left lower leg: No edema.      Comments: Negative leg raise.  Positive for Fabere's test eliciting back pain.  Positive for minimal bilateral SI joint pain on palpation.  Positive for prominent paraspinal tenderness at the lumbar region at the right L4-L5, L5-S1 with rotation.  Positive for minimal paraspinal tenderness at the lumbar region at the left L4-L5, L5-S1 with rotation.  No radicular symptoms.  BUE 4/5, BLE 3-4/5.   Skin:     General: Skin is warm and dry.   Neurological:      General: No focal deficit present.      Mental Status: She is alert and oriented to person, place, and time.   Psychiatric:         Mood and Affect: Mood normal.         Behavior: Behavior normal.          Last Recorded Vitals  /76   Pulse 77   Temp 36.6 °C (97.9 °F) (Temporal)   Wt 70.8 kg (156 lb)     Relevant Results      Pain Management Panel  More data exists         Latest Ref Rng & Units 12/4/2023 8/25/2023   Pain Management Panel   Amphetamine Screen, Urine Presumptive Negative Presumptive Negative  PRESUMPTIVE NEGATIVE    Barbiturate Screen, Urine Presumptive Negative Presumptive Negative  PRESUMPTIVE NEGATIVE     Benzodiazepines Screen, Urine Presumptive Negative Presumptive Negative  -   Fentanyl Screen, Urine Presumptive Negative Presumptive Negative  PRESUMPTIVE NEGATIVE                  ------------------  * *Final Report* * *     DATE OF EXAM: Apr 8 2022  9:14AM      HCX   5231  -  XR LUMBAR 4V AP/LAT/ FLEX/EXT  / ACCESSION #  [id number]     PROCEDURE REASON: multiple diagnoses          * * * * Physician Interpretation * * * *     RESULT: EXAMINATION:  XR LUMBAR 4V AP/LAT/ FLEX/EXT     PATIENT/TECHNOLOGIST PROVIDED HISTORY:   PT STATES: LOWER PAIN CHRONIC   PAIN     CLINICAL INFORMATION:  Lumbar radiculopathy Spinal stenosis, lumbar   region, without neurogenic claudication     TECHNIQUE: Lumbar spine, 4 views     COMPARISON: None     RESULT:     Counting reference:  Lumbosacral junction.  For the purposes of this   report,  L4-5 is considered the level of the iliac crest and assume there   are 5 lumbar-type vertebrae.  Anatomic variant:  None.     Mild stepwise grade 1 anterolisthesis of L2 on L3, L3 on L4, and L4 on   L5.  Stepwise anterolisthesis appears to be reduced to near anatomic   sagittal alignment on extension view (see with exception of some   persistent slight grade 1 anterolisthesis of L4 and L5).  Anterolisthesis   of L3 on L4 is slightly exaggerated on flexion view of approximately 1-2   mm.  Lumbar levoscoliosis centered at L3.  Normal vertebral body heights.    Normal disc space heights.  Moderate to severe multilevel degenerative   changes of the lumbar spine with endplate spurring and multilevel disc   space narrowing.  Degenerative changes are most pronounced at L3-L4 with   severe disc space narrowing at the right lateral aspect.  Moderate   multilevel facet joint sclerosis, most pronounced at L5-S1.  Diffuse   osteopenia, somewhat limiting evaluation of bony detail.  Vascular   calcifications.   -----------------------      Assessment/Plan   Problem List Items Addressed This Visit    None  Visit  Diagnoses         Codes    Facet arthropathy, lumbosacral    -  Primary M47.817    Relevant Orders    Radiofrequency Ablation    Chronic bilateral low back pain without sciatica     M54.50, G89.29    Relevant Orders    Radiofrequency Ablation    Sacroiliitis (CMS/HCC)     M46.1    Acute cough     R05.1    Relevant Orders    XR chest 2 views (Completed)    Medication management     Z79.899    Relevant Orders    Drug Screen, Urine With Reflex to Confirmation (Completed)                   1. Facet arthropathy, lumbosacral  - Radiofrequency Ablation; Future    2. Chronic bilateral low back pain without sciatica  - Radiofrequency Ablation; Future    3. Sacroiliitis (CMS/HCC)    4. Acute cough    - XR chest 2 views    5. Medication management  - Drug Screen, Urine With Reflex to Confirmation; Future  - Drug Screen, Urine With Reflex to Confirmation       Plan/Follow-up Instructions:     You are scheduled for right L4-L5, L5-S1 RFA on 12/11/2023 in [place] with Dr. Way.  Stop Eliquis for 2 days.  The office will call you for further instructions.  You will receive Cipro before your procedure due to history of right total knee.  I will then see you for your postprocedure follow-up in 3 to 4 weeks.    I ordered chest x-ray and you may have it done anytime.      Disclaimer: This note was created using voice recognition software. It was not corrected for typographical or grammatical errors, inadvertent word insertion, or any unintended errors. Please feel free to contact me for clarification.        Rosaura Ferro, LIZBETH, APRN, FNP-C   [facility] [place] [facility]  Office #: [phone]  Fax # [phone]

## 2023-12-04 NOTE — PATIENT INSTRUCTIONS
You are scheduled for right L4-L5, L5-S1 RFA on 12/11/2023 in [place] with Dr. Way.  Stop Eliquis for 2 days.  The office will call you for further instructions.  You will receive Cipro before your procedure due to history of right total knee.  I will then see you for your postprocedure follow-up in 3 to 4 weeks.    I ordered chest x-ray and you may have it done anytime.

## 2023-12-04 NOTE — H&P (VIEW-ONLY)
"Subjective   Patient ID: Zhanna Perez \"Juan Alberto\" is a 89 y.o. female who presents for Back Pain (Lower back pain ).    Zhanna is a pleasant 89-year-old  female who is here for postprocedure follow-up.  Patient presents in a wheelchair.  She arrives with her daughter.    Patient had right SI joint injection done on 10/10/2023.  The injection has improved her back pain, leg pain and hip pain.  It provided 80% relief for 3 weeks.  Patient reports that the injection relief is now at 50%.  The injection has improved some of her pain and her ability to participate in her daily activities.    Patient continues to have chronic lower back pain.  Pain is prominent on the right with minimal pain to the left.  She rates her pain as 7-8 out of 10.  It is constant.  She describes it as sharp.   She reports since she missed several appointments that she has more pain to her back.  Back pain interferes with her physical functioning.  Patient is not able to straighten her back due to the pain. Patient now lives at the [facility]. She denies pain, numbness or tingling sensation to her legs.  She denies increasing leg weakness or change in balance.  She denies recent injury.    Patient has occasional moist cough during this visit.  Per daughter that she has been coughing for a week.  She is not on any medication.  I discussed alternative treatment for her cough.  We will also do chest x-ray.    Patient continues to take duloxetine prescribed by her PCP.  She is on Eliquis.  She denies side effects from her medications.      Patient had right L4-L5, L5-S1 RFA on 9/16/2022 with Dr. Banks.  She reports she received more than 80% relief for a year.  I discussed the plan of care including pharmacologic and joint interventional procedure.  I discussed right-sided lumbar RFA.  I also discussed medial branch block for the left side.  Patient is in agreement to proceed with RFA.  This is done under " fluoroscopy.  We will address the left medial branch block with her follow-up visit.  Questions were answered during this encounter.     HEMAL was done today and she scored 38.  The form was scanned and included in this note.    -------------  10/10/2023: Right SI injection  9/16/2022: Right L4-L5, L5-S1 RFA with Dr. Banks  -------        Past Medical History  She has a past medical history of Age-related cognitive decline (03/31/2015), Body mass index (BMI) 32.0-32.9, adult (10/28/2020), Bursitis of right shoulder (08/21/2018), Encounter for general adult medical examination without abnormal findings (04/01/2016), Encounter for other preprocedural examination (08/30/2016), Erythematous condition, unspecified (05/09/2018), Mastitis without abscess (05/09/2018), Mastodynia (05/09/2018), Mastodynia (05/09/2018), Obesity, unspecified (08/02/2021), Obesity, unspecified (10/28/2020), Obesity, unspecified (09/15/2021), Obesity, unspecified (05/03/2021), Occipital neuralgia (05/12/2016), Other conditions influencing health status (01/28/2020), Other conditions influencing health status (04/07/2017), Pain in right shoulder (07/05/2018), Pain in unspecified hip (12/29/2014), Personal history of other diseases of the female genital tract (05/09/2018), Personal history of other diseases of the musculoskeletal system and connective tissue, Personal history of other diseases of the nervous system and sense organs (08/23/2017), Personal history of other diseases of the nervous system and sense organs (03/27/2014), Personal history of other diseases of the respiratory system (06/27/2016), Personal history of other diseases of the respiratory system (06/19/2020), Personal history of other infectious and parasitic diseases (02/25/2016), Trigger finger, right middle finger (03/19/2020), Trochanteric bursitis, left hip (06/03/2019), 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    Surgical History  Past Surgical History:   Procedure Laterality Date    BREAST SURGERY  03/02/2015    Breast Surgery    EYE SURGERY  03/02/2015    Eye Surgery    JOINT REPLACEMENT Right     knee replacement    KNEE ARTHROSCOPY W/ DEBRIDEMENT  04/30/2013    Knee Arthroscopy (Therapeutic)    MR NECK ANGIO WO IV CONTRAST  02/26/2016    MR NECK ANGIO WO IV CONTRAST 2/26/2016 [facility]    OTHER SURGICAL HISTORY  04/30/2013    Supracervical Hysterectomy Laparoscopic Uterus 250g Or Less    TONSILLECTOMY  04/30/2013    Tonsillectomy        Social History  She reports that she quit smoking about 72 years ago. Her smoking use included cigarettes. She has never used smokeless tobacco. She reports that she does not drink alcohol and does not use drugs.    Family History  Family History   Problem Relation Name Age of Onset    Brain cancer Father      Coronary artery disease Brother          Allergies  Codeine, Darvocet a500 [propoxyphene n-acetaminophen], Dilaudid [hydromorphone], Gabapentin, and Propoxyphene-acetaminophen      Current Outpatient Medications:     acetaminophen (TYLENOL ORAL), Take by mouth., Disp: , Rfl:     alendronate (Fosamax) 70 mg tablet, Take 1 tablet (70 mg) by mouth every 7 days. IN AM W/ 6-8OZ PLAIN WATER. DO NOT EAT/LIE DOWN FOR 30 MIN AFTER, Disp: 12 tablet, Rfl: 0    apixaban (Eliquis) 5 mg tablet, Take 1 tablet (5 mg) by mouth 2 times a day., Disp: 180 tablet, Rfl: 0    blood pressure monitor kit, Use to check BP daily and as needed, Disp: 1 kit, Rfl: 0    blood sugar diagnostic (Premier Test Strip) strip, TEST BLOOD SUGARonce daily. E11.9, Disp: 100 each, Rfl: 0    cholecalciferol (Vitamin D-3) 25 MCG (1000 UT) capsule, Take 1 capsule (25 mcg) by mouth once daily., Disp: 90 capsule, Rfl: 0    DULoxetine (Cymbalta) 30 mg DR capsule, Take 1 capsule (30 mg) by mouth once daily., Disp: 90  "capsule, Rfl: 0    fluticasone (Flonase) 50 mcg/actuation nasal spray, SPRAY 2 SPRAYS INTO EACH NOSTRIL EVERY DAY, Disp: 48 mL, Rfl: 1    folic acid (Folvite) 1 mg tablet, Take 1 tablet (1 mg) by mouth once daily., Disp: 90 tablet, Rfl: 0    FreeStyle glucose monitoring kit, 1 each if needed., Disp: , Rfl:     glimepiride (Amaryl) 2 mg tablet, Take 1 tablet (2 mg) by mouth 2 times a day., Disp: 180 tablet, Rfl: 0    insulin glargine (Lantus Solostar U-100 Insulin) 100 unit/mL (3 mL) pen, Inject 30 Units under the skin once daily at bedtime. Take as directed per insulin instructions., Disp: 30 mL, Rfl: 0    levothyroxine (Synthroid, Levoxyl) 25 mcg tablet, Take 1 tablet (25 mcg) by mouth once daily., Disp: 90 tablet, Rfl: 0    lisinopril 40 mg tablet, Take 1 tablet (40 mg) by mouth once daily., Disp: 90 tablet, Rfl: 0    meclizine (Antivert) 25 mg tablet, Take 1 tablet (25 mg) by mouth 3 times a day as needed for dizziness., Disp: 30 tablet, Rfl: 0    omeprazole (PriLOSEC) 20 mg DR capsule, Take 1 capsule (20 mg) by mouth once daily in the morning. Take before meals. Do not crush or chew., Disp: 90 capsule, Rfl: 0    ondansetron ODT (Zofran-ODT) 4 mg disintegrating tablet, Take 1 tablet (4 mg) by mouth every 8 hours if needed for nausea or vomiting., Disp: 20 tablet, Rfl: 0    rosuvastatin (Crestor) 20 mg tablet, Take 1 tablet (20 mg) by mouth once daily., Disp: 90 tablet, Rfl: 0    pen needle, diabetic (BD Ultra-Fine Mini Pen Needle) 31 gauge x 3/16\" needle, INJECT 1 EACH UNDER THE SKIN ONCE DAILY. USE AS INSTRUCTED. USE TO INJECT INSULIN, Disp: 90 each, Rfl: 0     Review of Systems   Constitutional: Negative.    HENT: Negative.     Eyes: Negative.    Respiratory: Negative.     Cardiovascular: Negative.    Gastrointestinal: Negative.    Endocrine: Negative.    Genitourinary: Negative.    Musculoskeletal:  Positive for arthralgias, back pain and myalgias. Negative for gait problem, joint swelling, neck pain and neck " stiffness.   Skin: Negative.    Allergic/Immunologic: Negative.    Neurological: Negative.    Psychiatric/Behavioral: Negative.          Physical Exam  Vitals and nursing note reviewed.   HENT:      Head: Normocephalic.      Nose: Nose normal.   Eyes:      Extraocular Movements: Extraocular movements intact.      Conjunctiva/sclera: Conjunctivae normal.      Pupils: Pupils are equal, round, and reactive to light.   Cardiovascular:      Rate and Rhythm: Normal rate and regular rhythm.   Pulmonary:      Effort: Pulmonary effort is normal.      Breath sounds: Normal breath sounds.   Musculoskeletal:         General: Tenderness present. No swelling, deformity or signs of injury.      Cervical back: No rigidity or tenderness.      Lumbar back: Spasms and tenderness present.      Right lower leg: No edema.      Left lower leg: No edema.      Comments: Negative leg raise.  Positive for Fabere's test eliciting back pain.  Positive for minimal bilateral SI joint pain on palpation.  Positive for prominent paraspinal tenderness at the lumbar region at the right L4-L5, L5-S1 with rotation.  Positive for minimal paraspinal tenderness at the lumbar region at the left L4-L5, L5-S1 with rotation.  No radicular symptoms.  BUE 4/5, BLE 3-4/5.   Skin:     General: Skin is warm and dry.   Neurological:      General: No focal deficit present.      Mental Status: She is alert and oriented to person, place, and time.   Psychiatric:         Mood and Affect: Mood normal.         Behavior: Behavior normal.          Last Recorded Vitals  /76   Pulse 77   Temp 36.6 °C (97.9 °F) (Temporal)   Wt 70.8 kg (156 lb)     Relevant Results      Pain Management Panel  More data exists         Latest Ref Rng & Units 12/4/2023 8/25/2023   Pain Management Panel   Amphetamine Screen, Urine Presumptive Negative Presumptive Negative  PRESUMPTIVE NEGATIVE    Barbiturate Screen, Urine Presumptive Negative Presumptive Negative  PRESUMPTIVE NEGATIVE     Benzodiazepines Screen, Urine Presumptive Negative Presumptive Negative  -   Fentanyl Screen, Urine Presumptive Negative Presumptive Negative  PRESUMPTIVE NEGATIVE                  ------------------  * *Final Report* * *     DATE OF EXAM: Apr 8 2022  9:14AM      HCX   5231  -  XR LUMBAR 4V AP/LAT/ FLEX/EXT  / ACCESSION #  [id number]     PROCEDURE REASON: multiple diagnoses          * * * * Physician Interpretation * * * *     RESULT: EXAMINATION:  XR LUMBAR 4V AP/LAT/ FLEX/EXT     PATIENT/TECHNOLOGIST PROVIDED HISTORY:   PT STATES: LOWER PAIN CHRONIC   PAIN     CLINICAL INFORMATION:  Lumbar radiculopathy Spinal stenosis, lumbar   region, without neurogenic claudication     TECHNIQUE: Lumbar spine, 4 views     COMPARISON: None     RESULT:     Counting reference:  Lumbosacral junction.  For the purposes of this   report,  L4-5 is considered the level of the iliac crest and assume there   are 5 lumbar-type vertebrae.  Anatomic variant:  None.     Mild stepwise grade 1 anterolisthesis of L2 on L3, L3 on L4, and L4 on   L5.  Stepwise anterolisthesis appears to be reduced to near anatomic   sagittal alignment on extension view (see with exception of some   persistent slight grade 1 anterolisthesis of L4 and L5).  Anterolisthesis   of L3 on L4 is slightly exaggerated on flexion view of approximately 1-2   mm.  Lumbar levoscoliosis centered at L3.  Normal vertebral body heights.    Normal disc space heights.  Moderate to severe multilevel degenerative   changes of the lumbar spine with endplate spurring and multilevel disc   space narrowing.  Degenerative changes are most pronounced at L3-L4 with   severe disc space narrowing at the right lateral aspect.  Moderate   multilevel facet joint sclerosis, most pronounced at L5-S1.  Diffuse   osteopenia, somewhat limiting evaluation of bony detail.  Vascular   calcifications.   -----------------------      Assessment/Plan   Problem List Items Addressed This Visit    None  Visit  Diagnoses         Codes    Facet arthropathy, lumbosacral    -  Primary M47.817    Relevant Orders    Radiofrequency Ablation    Chronic bilateral low back pain without sciatica     M54.50, G89.29    Relevant Orders    Radiofrequency Ablation    Sacroiliitis (CMS/HCC)     M46.1    Acute cough     R05.1    Relevant Orders    XR chest 2 views (Completed)    Medication management     Z79.899    Relevant Orders    Drug Screen, Urine With Reflex to Confirmation (Completed)                   1. Facet arthropathy, lumbosacral  - Radiofrequency Ablation; Future    2. Chronic bilateral low back pain without sciatica  - Radiofrequency Ablation; Future    3. Sacroiliitis (CMS/HCC)    4. Acute cough    - XR chest 2 views    5. Medication management  - Drug Screen, Urine With Reflex to Confirmation; Future  - Drug Screen, Urine With Reflex to Confirmation       Plan/Follow-up Instructions:     You are scheduled for right L4-L5, L5-S1 RFA on 12/11/2023 in [place] with Dr. Way.  Stop Eliquis for 2 days.  The office will call you for further instructions.  You will receive Cipro before your procedure due to history of right total knee.  I will then see you for your postprocedure follow-up in 3 to 4 weeks.    I ordered chest x-ray and you may have it done anytime.      Disclaimer: This note was created using voice recognition software. It was not corrected for typographical or grammatical errors, inadvertent word insertion, or any unintended errors. Please feel free to contact me for clarification.        Rosaura Ferro, LIZBETH, APRN, FNP-C   Critical access hospital/[facility]  Office #: [phone]  Fax # [phone]

## 2023-12-07 DIAGNOSIS — M47.817 FACET ARTHROPATHY, LUMBOSACRAL: Primary | ICD-10-CM

## 2023-12-08 ENCOUNTER — APPOINTMENT (OUTPATIENT)
Dept: PAIN MEDICINE | Facility: HOSPITAL | Age: 88
End: 2023-12-08
Payer: MEDICARE

## 2023-12-11 ENCOUNTER — HOSPITAL ENCOUNTER (OUTPATIENT)
Dept: RADIOLOGY | Facility: HOSPITAL | Age: 88
Discharge: HOME | End: 2023-12-11
Payer: MEDICARE

## 2023-12-11 ENCOUNTER — HOSPITAL ENCOUNTER (OUTPATIENT)
Dept: PAIN MEDICINE | Facility: HOSPITAL | Age: 88
Discharge: HOME | End: 2023-12-11
Payer: MEDICARE

## 2023-12-11 VITALS
SYSTOLIC BLOOD PRESSURE: 171 MMHG | RESPIRATION RATE: 16 BRPM | DIASTOLIC BLOOD PRESSURE: 77 MMHG | HEART RATE: 73 BPM | OXYGEN SATURATION: 97 % | TEMPERATURE: 98.1 F

## 2023-12-11 DIAGNOSIS — M47.817 SPONDYLOSIS OF LUMBOSACRAL REGION WITHOUT MYELOPATHY OR RADICULOPATHY: ICD-10-CM

## 2023-12-11 DIAGNOSIS — M46.1 SACROILIITIS, NOT ELSEWHERE CLASSIFIED (CMS-HCC): ICD-10-CM

## 2023-12-11 DIAGNOSIS — M47.817 FACET ARTHROPATHY, LUMBOSACRAL: ICD-10-CM

## 2023-12-11 DIAGNOSIS — M54.50 BACK PAIN, LUMBOSACRAL: Primary | ICD-10-CM

## 2023-12-11 DIAGNOSIS — G89.29 CHRONIC BILATERAL LOW BACK PAIN WITHOUT SCIATICA: ICD-10-CM

## 2023-12-11 DIAGNOSIS — M54.50 CHRONIC BILATERAL LOW BACK PAIN WITHOUT SCIATICA: ICD-10-CM

## 2023-12-11 LAB — GLUCOSE BLD MANUAL STRIP-MCNC: 65 MG/DL (ref 74–99)

## 2023-12-11 PROCEDURE — 64636 DESTROY L/S FACET JNT ADDL: CPT | Performed by: ANESTHESIOLOGY

## 2023-12-11 PROCEDURE — 2500000001 HC RX 250 WO HCPCS SELF ADMINISTERED DRUGS (ALT 637 FOR MEDICARE OP)

## 2023-12-11 PROCEDURE — 99153 MOD SED SAME PHYS/QHP EA: CPT | Performed by: ANESTHESIOLOGY

## 2023-12-11 PROCEDURE — 77003 FLUOROGUIDE FOR SPINE INJECT: CPT

## 2023-12-11 PROCEDURE — 99152 MOD SED SAME PHYS/QHP 5/>YRS: CPT | Performed by: ANESTHESIOLOGY

## 2023-12-11 PROCEDURE — 64635 DESTROY LUMB/SAC FACET JNT: CPT | Performed by: ANESTHESIOLOGY

## 2023-12-11 PROCEDURE — 82947 ASSAY GLUCOSE BLOOD QUANT: CPT

## 2023-12-11 RX ORDER — SODIUM CHLORIDE, SODIUM LACTATE, POTASSIUM CHLORIDE, CALCIUM CHLORIDE 600; 310; 30; 20 MG/100ML; MG/100ML; MG/100ML; MG/100ML
INJECTION, SOLUTION INTRAVENOUS
Status: DISCONTINUED
Start: 2023-12-11 | End: 2023-12-11 | Stop reason: WASHOUT

## 2023-12-11 RX ORDER — CIPROFLOXACIN 500 MG/1
TABLET ORAL
Status: COMPLETED
Start: 2023-12-11 | End: 2023-12-11

## 2023-12-11 RX ORDER — CIPROFLOXACIN 500 MG/1
500 TABLET ORAL ONCE
Status: DISCONTINUED | OUTPATIENT
Start: 2023-12-11 | End: 2023-12-12 | Stop reason: HOSPADM

## 2023-12-11 RX ADMIN — CIPROFLOXACIN 500 MG: 500 TABLET, FILM COATED ORAL at 12:56

## 2023-12-11 ASSESSMENT — PAIN - FUNCTIONAL ASSESSMENT
PAIN_FUNCTIONAL_ASSESSMENT: 0-10
PAIN_FUNCTIONAL_ASSESSMENT: 0-10

## 2023-12-11 ASSESSMENT — PAIN SCALES - GENERAL
PAINLEVEL_OUTOF10: 7
PAINLEVEL_OUTOF10: 0 - NO PAIN

## 2023-12-11 ASSESSMENT — COLUMBIA-SUICIDE SEVERITY RATING SCALE - C-SSRS
2. HAVE YOU ACTUALLY HAD ANY THOUGHTS OF KILLING YOURSELF?: NO
1. IN THE PAST MONTH, HAVE YOU WISHED YOU WERE DEAD OR WISHED YOU COULD GO TO SLEEP AND NOT WAKE UP?: NO
6. HAVE YOU EVER DONE ANYTHING, STARTED TO DO ANYTHING, OR PREPARED TO DO ANYTHING TO END YOUR LIFE?: NO

## 2023-12-11 NOTE — DISCHARGE INSTRUCTIONS
Discharge Instructions:  Keep band-aid on for the next 24 hours.  No showering/bathing for the next 24 hours. You may notice soreness or increased pain in the area of your injection, which may continue for the first 48 hours.  Avoid driving or operating any heavy machinery until the next day after the procedure.  You should resume any medications and your regular diet after the procedure. Some of the side affects you may experience from the steroids are: Insomnia (inability to sleep), Increased sweating, Headaches, Increased fluid retention (swelling of your extremities), Increased appetite, Face flushing, If you are a diabetic, your blood sugars may go up.  Closely monitor your diet.  Your blood sugar should return to normal in a few days.  Complications: If the discomfort persists, apply moist heat to the area.  Serious complications are very uncommon but may include bleeding, infection or nerve damage. If sever pain, fever, redness or swelling near the injection site, have someone take you to the nearest emergency room to be evaluated for procedure complications or infection. Expectations: Local anesthetics wear off in several hours but duration of relief varies from individual to individual.  If you have any questions, please call the office at [phone].  If this is an emergency, please go to the nearest emergency room.

## 2023-12-11 NOTE — INTERVAL H&P NOTE
H&P reviewed. The patient was examined and there are no changes to the H&P.  Patient denies any new c/o or concerns, denies any recent falls, denies sob, cp, n/v/d, difficulty with urination or bm's.

## 2023-12-11 NOTE — OP NOTE
"Date: 2023  OR Location: CON NON-OR PROCEDURES    Name: Zhanna Perez \"Juan Alberto\", : 1932, Age: 89 y.o., MRN: [id number], Sex: female    Diagnosis   1. Back pain, lumbosacral        2. Facet arthropathy, lumbosacral  Radiofrequency Ablation    Radiofrequency Ablation      3. Chronic bilateral low back pain without sciatica  Radiofrequency Ablation    Radiofrequency Ablation      4. Sacroiliitis, not elsewhere classified (CMS/HCC)  ciprofloxacin (Cipro) tablet 500 mg    Vital Signs    Vital Signs      5. Spondylosis of lumbosacral region without myelopathy or radiculopathy          Preoperative Diagnosis: Lumbar spondylosis without myelopathy 4 5 L5-S1    Operation Performed:   1 Right percutaneous localization of medial branch nerves of L4-5 and L5-S1 right with fluoroscopy/neural stimulation.    2. Lumbar radiofrequency ablation of the above-mentioned medial branch nerves.      Procedures     Medical necessity was determined by the above-diagnosis.     Anesthesia: Local    Estimated Blood Loss: none    Complications: none    CLINICAL NOTE: This patient was admitted to the pain center to undergo bilateral radiofrequency ablation of the lumbar medial branch nerves L4-5 and L5 5 S1 on the right.  The patient has had a history of severe low back pain for the past 10 months. The pain is axial in nature, involving the buttocks and posterior thigh. Prior to being taken to the operating room, all risks and potential outcomes, side effects and complications to include dural puncture, nerve damage, bleeding, increased pain, and infection, were re-explained to the patient, along with the rationale to perform the procedure. The patient voluntarily signed a consent form and no guarantees were made.      PROCEDURE: The 89 y.o. female patient was taken to the operating room and placed in the prone position with proper monitoring of vital signs. Monitored anesthesia care was administered so that the patient could " interact with me during the procedure.  No IV conscious sedation was used to allay anxiety. The patient was prepped and draped in the usual sterile fashion. This skin and subcutaneous tissue were anesthetized with a 25-gauge 1-1/4 inch needle using 8 cc's of 1% lidocaine. Using multiplanar fluoroscopy guidance, the medial branch nerves (MBN's) were individually needle-localized at L4-5 L5-S1 on the right with a 18-gauge angulated Dataslide 10 mm exposed tip venom needle. I correctly placed the needle tip at the junction of the superior articular process and the transverse process, where the mammilloaccessory ligament is located. Each RFA was performed with the exposed needle tip safely over and contacting bony margins. To further clarify and localize the anatomical targets, neurosensory stimulation of the medial branch nerves was carried out between 50 and 75 cycles per second. I detected paraspinal muscle twitching without radicular or motor responses when stimulating for motor nerves at 2 cps up to 2 V. Appropriate sensory responses without radicular motor response confirmed the needle tip was away from respective nerve root elements. After this was confirmed at each individual level being treated, 1 mL of 2% lidocaine was injected; Then each of the 2 medial branch nerves was individually ablated at 85°C for 60 seconds. The needles were then rotated 90° and a second RF a was performed at 85° for 60 seconds. This was followed by injecting 20 mg of Depo-Medrol +1 mL of 2% lidocaine. This was performed and both levels post ablation to overt the potential for postoperative neuritis. The needles were then removed and sterile bandages with Neosporin ointment were applied to the puncture sites. A total of 2 sites were ablated with radiofrequency ablation.    The patient tolerated the procedure well and remained both hemodynamically stable and neurologically intact and was transferred to the recovery room in stable  condition. The patient was discharged with instructions to follow-up in the clinic in 4-6 weeks.

## 2023-12-14 DIAGNOSIS — T78.40XD ALLERGY, SUBSEQUENT ENCOUNTER: ICD-10-CM

## 2023-12-15 RX ORDER — FLUTICASONE PROPIONATE 50 MCG
SPRAY, SUSPENSION (ML) NASAL
Qty: 48 ML | Refills: 1 | Status: SHIPPED | OUTPATIENT
Start: 2023-12-15 | End: 2024-02-12 | Stop reason: SDUPTHER

## 2024-01-16 ENCOUNTER — OFFICE VISIT (OUTPATIENT)
Dept: PAIN MEDICINE | Facility: HOSPITAL | Age: 89
End: 2024-01-16
Payer: MEDICARE

## 2024-01-16 VITALS
HEIGHT: 57 IN | RESPIRATION RATE: 16 BRPM | HEART RATE: 88 BPM | WEIGHT: 159.17 LBS | TEMPERATURE: 97 F | DIASTOLIC BLOOD PRESSURE: 74 MMHG | BODY MASS INDEX: 34.34 KG/M2 | SYSTOLIC BLOOD PRESSURE: 162 MMHG

## 2024-01-16 DIAGNOSIS — M48.061 SPINAL STENOSIS OF LUMBAR REGION WITHOUT NEUROGENIC CLAUDICATION: Primary | ICD-10-CM

## 2024-01-16 DIAGNOSIS — M46.1 BILATERAL SACROILIITIS (CMS-HCC): ICD-10-CM

## 2024-01-16 PROCEDURE — 3077F SYST BP >= 140 MM HG: CPT | Performed by: ANESTHESIOLOGY

## 2024-01-16 PROCEDURE — 3078F DIAST BP <80 MM HG: CPT | Performed by: ANESTHESIOLOGY

## 2024-01-16 PROCEDURE — 1159F MED LIST DOCD IN RCRD: CPT | Performed by: ANESTHESIOLOGY

## 2024-01-16 PROCEDURE — 1036F TOBACCO NON-USER: CPT | Performed by: ANESTHESIOLOGY

## 2024-01-16 PROCEDURE — 99214 OFFICE O/P EST MOD 30 MIN: CPT | Performed by: ANESTHESIOLOGY

## 2024-01-16 PROCEDURE — 1160F RVW MEDS BY RX/DR IN RCRD: CPT | Performed by: ANESTHESIOLOGY

## 2024-01-16 PROCEDURE — 1125F AMNT PAIN NOTED PAIN PRSNT: CPT | Performed by: ANESTHESIOLOGY

## 2024-01-16 ASSESSMENT — ENCOUNTER SYMPTOMS: BACK PAIN: 1

## 2024-01-16 ASSESSMENT — PAIN SCALES - GENERAL: PAINLEVEL: 8

## 2024-01-16 NOTE — PROGRESS NOTES
"Subjective   Patient ID: Zhanna Perez \"Juan Alberto\" is a 89 y.o. female who presents for chronic low back pain.  She had radiofrequency ablation lumbar medial branch nerves on 12/11/2023 with 80% relief however her pain is slowly returned over the past month.  Pain is now located over her sacroiliac joints.  Physical exam there is positive compression test as well as hip thigh thrust test and Fabere sign with no sign of fracture or dislocation or infection.  Chronic low back pain secondary to advanced facet arthrosis as well as spinal canal stenosis at the L4-L5 level   Review of Systems   Musculoskeletal:  Positive for back pain.   All other systems reviewed and are negative.    Objective   Physical Exam  Gen. appearance:  Patient's alert and oriented ×3 ;cooperative mild to moderate distress  Heart: Regular rate and rhythm  Lungs: Clear to auscultation  Abdomen: Soft nontender  Neuro: Cranial nerves II -XII intact; DTR: +2 over 4 biceps triceps brachial radialis patellar and Achilles  Spinal exam: Positive paraspinal tenderness noted bilaterally L4 5 L5-S1 with flexion extension and rotation/no bony abnormalities  Musculoskeletal:  Upper and lower extremity strength was 4/5 bilaterally positive compression test as well as hip thigh thrust test and Fabere sign bilaterally  :  deferred  Skin: Warm and dry      Assessment/Plan   Diagnoses and all orders for this visit:  Spinal stenosis of lumbar region without neurogenic claudication  Bilateral sacroiliitis (CMS/HCC)  Patient scheduled for bilateral diagnostic sacroiliac joint injections under fluoroscopy on January 30, 2024.  She was told to be off of her Eliquis for 48 hours prior to the scheduled procedure.       Todd Way DO 01/16/24 3:23 PM   "

## 2024-01-16 NOTE — PROGRESS NOTES
89 yr old female here post right L4-L5 RFA on 12/11/23. She states she had about 70-80% relief but only lasted about 2 days. Pt complains of lower back pain, rating pain an 8/10 today. She goes on to state that it radiates down her legs, left leg worse.

## 2024-01-17 DIAGNOSIS — M53.3 SACROILIAC JOINT DYSFUNCTION: ICD-10-CM

## 2024-01-17 DIAGNOSIS — M46.1 SACROILIITIS (CMS-HCC): Primary | ICD-10-CM

## 2024-01-28 DIAGNOSIS — M46.1 SACROILIITIS (CMS-HCC): Primary | ICD-10-CM

## 2024-01-30 ENCOUNTER — HOSPITAL ENCOUNTER (OUTPATIENT)
Dept: RADIOLOGY | Facility: HOSPITAL | Age: 89
Discharge: HOME | End: 2024-01-30
Payer: MEDICARE

## 2024-01-30 ENCOUNTER — HOSPITAL ENCOUNTER (OUTPATIENT)
Dept: PAIN MEDICINE | Facility: HOSPITAL | Age: 89
Discharge: HOME | End: 2024-01-30
Payer: MEDICARE

## 2024-01-30 VITALS
DIASTOLIC BLOOD PRESSURE: 74 MMHG | TEMPERATURE: 96.8 F | RESPIRATION RATE: 17 BRPM | OXYGEN SATURATION: 100 % | WEIGHT: 160 LBS | SYSTOLIC BLOOD PRESSURE: 192 MMHG | HEART RATE: 73 BPM | HEIGHT: 57 IN | BODY MASS INDEX: 34.52 KG/M2

## 2024-01-30 DIAGNOSIS — M46.1 BILATERAL SACROILIITIS (CMS-HCC): ICD-10-CM

## 2024-01-30 DIAGNOSIS — M53.3 SACROILIAC JOINT DYSFUNCTION: ICD-10-CM

## 2024-01-30 DIAGNOSIS — M46.1 SACROILIITIS (CMS-HCC): ICD-10-CM

## 2024-01-30 DIAGNOSIS — M46.1 BILATERAL SACROILIITIS (CMS-HCC): Primary | ICD-10-CM

## 2024-01-30 PROCEDURE — 27096 INJECT SACROILIAC JOINT: CPT | Performed by: ANESTHESIOLOGY

## 2024-01-30 PROCEDURE — 2500000001 HC RX 250 WO HCPCS SELF ADMINISTERED DRUGS (ALT 637 FOR MEDICARE OP): Performed by: NURSE PRACTITIONER

## 2024-01-30 RX ORDER — CIPROFLOXACIN 500 MG/1
500 TABLET ORAL ONCE
Status: COMPLETED | OUTPATIENT
Start: 2024-01-30 | End: 2024-01-30

## 2024-01-30 RX ORDER — CIPROFLOXACIN 500 MG/1
TABLET ORAL
Status: DISCONTINUED
Start: 2024-01-30 | End: 2024-01-31 | Stop reason: HOSPADM

## 2024-01-30 RX ADMIN — CIPROFLOXACIN 500 MG: 500 TABLET, FILM COATED ORAL at 12:35

## 2024-01-30 ASSESSMENT — PAIN SCALES - GENERAL
PAINLEVEL_OUTOF10: 7
PAINLEVEL_OUTOF10: 0 - NO PAIN
PAINLEVEL_OUTOF10: 7

## 2024-01-30 ASSESSMENT — PAIN - FUNCTIONAL ASSESSMENT
PAIN_FUNCTIONAL_ASSESSMENT: 0-10
PAIN_FUNCTIONAL_ASSESSMENT: 0-10

## 2024-01-30 NOTE — INTERVAL H&P NOTE
H&P reviewed. The patient was examined and there are no changes to the H&P.    Doing well today. Stopped eliquis on 1/27/24. Is not receiving sedation. Denies fever, chills, SOB, CP, n/v/d. Heart and lung exam normal.

## 2024-01-30 NOTE — OP NOTE
"Date: 2024  OR Location: GEN NON-OR PROCEDURES    Name: Zhanna Perez \"Juan Alberto\", : 1932, Age: 89 y.o., MRN: [id number], Sex: female    Diagnosis   1. Bilateral sacroiliitis (CMS/HCC)        2. Sacroiliitis (CMS/HCC)  Sacroiliac Joint Injection    Sacroiliac Joint Injection      3. Sacroiliac joint dysfunction  Sacroiliac Joint Injection    Sacroiliac Joint Injection        Sacroiliac joint injection  Procedures   Preoperative/postop diagnosis: Sacroiliitis; osteoarthritis lumbosacral spine; lumbago  Anesthesia: local  Complications: None  Estimated blood loss: None  Preoperative/postop diagnosis: Sacroiliitis; osteoarthritis lumbosacral spine; lumbago  Patient is a 89 y.o. year-old  female who is here today to undergo a sacroiliac joint injection with fluoroscopy.  A sterile prep and drape ×3 was done with Betadine and ChloraPrep.  I then used a sterile 8 inch ring forceps and identified mid and the inferior border of the SI joint on the bilateral.  3 cc 1% Xylocaine plain was then injected via 25-gauge 1-1/2 sterile needle for local anesthesia. This was followed by placement of two sterile 22-gauge 3-1/2  spinal needles in the mid point and inferior border of the joint space. Negative aspiration was noted of fluids/blood and there were no paresthesias.  A total of 2 cc of Isovue outlined the joint space which was immediately followed by injection of 40 mg of Kenalog +3 cc of 0.25% Marcaine plain. The needles were then removed and a sterile bandage and Neosporin ointment applied over the puncture sites . A total of two needles were used per side.   The patient tolerated the procedure well and was taken to the recovery room in awake stable condition with no neurologic deficit or pain.  A followup visit is scheduled in 4-6 weeks.        "

## 2024-01-30 NOTE — DISCHARGE INSTRUCTIONS
Discharge Instructions:   ° Keep Band-Aid on for the next 24 hours.    ° No showering/bathing for the next 24 hours.    ° You may notice soreness or increased pain in the area of your injection, which may continue for the first 48 hours.    ° Avoid driving or operating any heavy machinery until the next day after the procedure.  ° You should resume any medications and your regular diet after the procedure.  ° You may resume regular daily activity but should avoid strenuous activity the day of the procedure.  Some of the side affects you may experience from the steroids are:  ° Insomnia (inability to sleep)  ° Increased sweating  ° Headaches  ° Increased fluid retention (swelling of your extremities)  ° Increase appetite  ° Face flushing  ° If you are a diabetic, your blood sugars may go up.  Closely monitor your diet.  Your blood sugar should return to normal in a few days.  Complications:   ° Complications are rare with the most common being temporary increase pain near the injection site. You can apply ice to affected area on the day of the procedure.   ° If the discomfort persists, apply moist heat to the area. Serious complications are very uncommon but may include bleeding, infection or nerve damage.   ° If severe pain, fever, redness or swelling near the injection site, have someone take you to the nearest emergency room to be evaluated for procedure complications or infection.  Expectations:   ° Local anesthetics wear off in several hours but duration of relief varies from individual to individual.     If you have any questions, please call the office at [phone].    If this is an emergency, call [phone] or go to your nearest hospital.

## 2024-01-31 ASSESSMENT — PAIN SCALES - GENERAL: PAINLEVEL_OUTOF10: 2

## 2024-02-11 ASSESSMENT — ENCOUNTER SYMPTOMS
RHINORRHEA: 0
CONSTIPATION: 0
SINUS PAIN: 0
DIZZINESS: 0
PALPITATIONS: 0
DIARRHEA: 0
ACTIVITY CHANGE: 0
ENDOCRINE NEGATIVE: 1
LIGHT-HEADEDNESS: 0
ALLERGIC/IMMUNOLOGIC NEGATIVE: 1
HEMATOLOGIC/LYMPHATIC NEGATIVE: 1
SHORTNESS OF BREATH: 0
PSYCHIATRIC NEGATIVE: 1
WHEEZING: 0
ABDOMINAL DISTENTION: 0
SINUS PRESSURE: 0
CHEST TIGHTNESS: 0

## 2024-02-11 NOTE — PROGRESS NOTES
Subjective   Patient ID: Juan Alberto Perez is a 89 y.o. female who presents for Diabetes (A1C, random), Hyperlipidemia, Hypertension, Night Sweats, Mouth Lesions (Can she have valtrex prn), and Cough (Would like an order for something prn).    Living at [facility]. Doing well.  She is participating in activities there.  Receiving PT/OT.  Her balance has improved.  Bilateral lower leg edema improved with furosemide and stopping amlodipine.  Educated regarding low-sodium diet, elevating legs throughout the day, compression socks.  Diabetes, controlled, improving A1c 7.2%, taking Lantus to 30 units at bedtime, glimepiride 4 mg twice a day. Continue to check blood sugar daily and bring record to next visit.   GERD, controlled with omeprazole 20 mg every morning.   Bilateral pulmonary emboli in August 2021. Patient saw hematology, further testing for coagulopathy was done. Hematology does not believe she has thrombophilia. Believes PE was provoked related to spinal stenosis and decreased mobility. Recommends staying on Eliquis long-term due to her risk of recurrent immobility. Hematology cleared her for spinal injections with holding Eliquis for 3 days prior to the procedure and resuming the day after.   Lumbar spondylosis, lumbar radiculitis.  Following with pain management, receiving injections.  Hypertension, controlled, continue lisinopril 40 mg daily.  Osteoporosis, taking alendronate 70 mg weekly. Taking Os-João daily. Vitamin D deficiency taking vitamin D 25 mcg daily.  DEXA due 2024.  Dyslipidemia, taking rosuvastatin 20 mg daily.  Levothyroxine, controlled, taking levothyroxine 25 mcg daily.  Taking Cymbalta 30 mg daily for arthritis and depression, controlled  Due for complete labs, plans to complete this morning  C/o some night sweats x 1 week.  Daughter plans to investigate the temperature of her room.  Will check UA to evaluate for UTI.  She denies any other symptoms such as new cough, chest  congestion, sinus issues.  Recurrent cold sores, family to call when she may need Valtrex.    Preventive:  CRC screen: Aged out  Mammogram: 10/2022 negative, declines  DEXA scan: 11/2022 osteoporosis   Ophthalmology: UTD  Podiatry: UTD  Urine albumin: 11/2023      Office Visit on 02/12/2024   Component Date Value Ref Range Status    POC Fingerstick Blood Glucose 02/12/2024 121 (A)  70 - 100 mg/dl Final    POC HEMOGLOBIN A1c 02/12/2024 7.2 (A)  4.2 - 6.5 % Final   Hospital Outpatient Visit on 12/11/2023   Component Date Value Ref Range Status    POCT Glucose 12/11/2023 65 (L)  74 - 99 mg/dL Final   Office Visit on 12/04/2023   Component Date Value Ref Range Status    Amphetamine Screen, Urine 12/04/2023 Presumptive Negative  Presumptive Negative Final    CUTOFF LEVEL: 500 NG/ML   Cross-reactivity has been reported with high concentrations   of the following drugs: buproprion, chloroquine, chlorpromazine,   ephedrine, mephentermine, fenfluramine, phentermine,   phenylpropanolamine, pseudoephedrine, and propranolol.    Barbiturate Screen, Urine 12/04/2023 Presumptive Negative  Presumptive Negative Final    CUTOFF LEVEL: 200 NG/ML    Benzodiazepines Screen, Urine 12/04/2023 Presumptive Negative  Presumptive Negative Final    CUTOFF LEVEL: 200 NG/ML    Cannabinoid Screen, Urine 12/04/2023 Presumptive Negative  Presumptive Negative Final    CUTOFF LEVEL: 50 NG/ML    Cocaine Metabolite Screen, Urine 12/04/2023 Presumptive Negative  Presumptive Negative Final    CUTOFF LEVEL: 150 NG/ML    Fentanyl Screen, Urine 12/04/2023 Presumptive Negative  Presumptive Negative Final    CUTOFF LEVEL: 5 NG/ML    Opiate Screen, Urine 12/04/2023 Presumptive Negative  Presumptive Negative Final    CUTOFF LEVEL: 300 NG/ML  The opiate screen does not detect fentanyl, meperidine, or   tramadol. Oxycodone is not consistently detected (refer to  Oxycodone Screen, Urine result).    Oxycodone Screen, Urine 12/04/2023 Presumptive Negative   Presumptive Negative Final    CUTOFF LEVEL: 100 NG/ML  This test will accurately detect both oxycodone and oxymorphone.    PCP Screen, Urine 12/04/2023 Presumptive Negative  Presumptive Negative Final    CUTOFF LEVEL:  25 NG/ML  Cross-reactivity has been reported with dextromethorphan.   Lab on 11/16/2023   Component Date Value Ref Range Status    Albumin, Urine Random 11/16/2023 24.6  Not established mg/L Final    Creatinine, Urine Random 11/16/2023 39.5  20.0 - 320.0 mg/dL Final    Albumin/Creatine Ratio 11/16/2023 62.3 (H)  <30.0 ug/mg Creat Final    WBC 11/16/2023 7.6  4.4 - 11.3 x10*3/uL Final    nRBC 11/16/2023 0.0  0.0 - 0.0 /100 WBCs Final    RBC 11/16/2023 4.27  4.00 - 5.20 x10*6/uL Final    Hemoglobin 11/16/2023 10.8 (L)  12.0 - 16.0 g/dL Final    Hematocrit 11/16/2023 37.2  36.0 - 46.0 % Final    MCV 11/16/2023 87  80 - 100 fL Final    MCH 11/16/2023 25.3 (L)  26.0 - 34.0 pg Final    MCHC 11/16/2023 29.0 (L)  32.0 - 36.0 g/dL Final    RDW 11/16/2023 16.0 (H)  11.5 - 14.5 % Final    Platelets 11/16/2023 305  150 - 450 x10*3/uL Final    Neutrophils % 11/16/2023 64.3  40.0 - 80.0 % Final    Immature Granulocytes %, Automated 11/16/2023 0.4  0.0 - 0.9 % Final    Immature Granulocyte Count (IG) includes promyelocytes, myelocytes and metamyelocytes but does not include bands. Percent differential counts (%) should be interpreted in the context of the absolute cell counts (cells/UL).    Lymphocytes % 11/16/2023 24.3  13.0 - 44.0 % Final    Monocytes % 11/16/2023 8.5  2.0 - 10.0 % Final    Eosinophils % 11/16/2023 1.8  0.0 - 6.0 % Final    Basophils % 11/16/2023 0.7  0.0 - 2.0 % Final    Neutrophils Absolute 11/16/2023 4.89  1.60 - 5.50 x10*3/uL Final    Percent differential counts (%) should be interpreted in the context of the absolute cell counts (cells/uL).    Immature Granulocytes Absolute, Au* 11/16/2023 0.03  0.00 - 0.50 x10*3/uL Final    Lymphocytes Absolute 11/16/2023 1.85  0.80 - 3.00 x10*3/uL Final     Monocytes Absolute 11/16/2023 0.65  0.05 - 0.80 x10*3/uL Final    Eosinophils Absolute 11/16/2023 0.14  0.00 - 0.40 x10*3/uL Final    Basophils Absolute 11/16/2023 0.05  0.00 - 0.10 x10*3/uL Final    Glucose 11/16/2023 104 (H)  74 - 99 mg/dL Final    Sodium 11/16/2023 143  136 - 145 mmol/L Final    Potassium 11/16/2023 4.6  3.5 - 5.3 mmol/L Final    Chloride 11/16/2023 105  98 - 107 mmol/L Final    Bicarbonate 11/16/2023 30  21 - 32 mmol/L Final    Anion Gap 11/16/2023 13  10 - 20 mmol/L Final    Urea Nitrogen 11/16/2023 15  6 - 23 mg/dL Final    Creatinine 11/16/2023 0.84  0.50 - 1.05 mg/dL Final    eGFR 11/16/2023 66  >60 mL/min/1.73m*2 Final    Calculations of estimated GFR are performed using the 2021 CKD-EPI Study Refit equation without the race variable for the IDMS-Traceable creatinine methods.  https://jasn.asnjournals.org/content/early/2021/09/22/ASN.3284371967    Calcium 11/16/2023 9.8  8.6 - 10.3 mg/dL Final    Albumin 11/16/2023 4.2  3.4 - 5.0 g/dL Final    Alkaline Phosphatase 11/16/2023 74  33 - 136 U/L Final    Total Protein 11/16/2023 6.7  6.4 - 8.2 g/dL Final    AST 11/16/2023 16  9 - 39 U/L Final    Bilirubin, Total 11/16/2023 0.3  0.0 - 1.2 mg/dL Final    ALT 11/16/2023 13  7 - 45 U/L Final    Patients treated with Sulfasalazine may generate falsely decreased results for ALT.    Cholesterol 11/16/2023 145  0 - 199 mg/dL Final          Age      Desirable   Borderline High   High     0-19 Y     0 - 169       170 - 199     >/= 200    20-24 Y     0 - 189       190 - 224     >/= 225         >24 Y     0 - 199       200 - 239     >/= 240   **All ranges are based on fasting samples. Specific   therapeutic targets will vary based on patient-specific   cardiac risk.    Pediatric guidelines reference:Pediatrics 2011, 128(S5).Adult guidelines reference: NCEP ATPIII Guidelines,CORTEZ 2001, 258:2486-97    Venipuncture immediately after or during the administration of Metamizole may lead to falsely low results.  Testing should be performed immediately prior to Metamizole dosing.    HDL-Cholesterol 11/16/2023 44.6  mg/dL Final      Age       Very Low   Low     Normal    High    0-19 Y    < 35      < 40     40-45     ----  20-24 Y    ----     < 40      >45      ----        >24 Y      ----     < 40     40-60      >60      Cholesterol/HDL Ratio 11/16/2023 3.3   Final      Ref Values  Desirable  < 3.4  High Risk  > 5.0    LDL Calculated 11/16/2023 53  <=99 mg/dL Final                                Near   Borderline      AGE      Desirable  Optimal    High     High     Very High     0-19 Y     0 - 109     ---    110-129   >/= 130     ----    20-24 Y     0 - 119     ---    120-159   >/= 160     ----      >24 Y     0 -  99   100-129  130-159   160-189     >/=190      VLDL 11/16/2023 47 (H)  0 - 40 mg/dL Final    Triglycerides 11/16/2023 236 (H)  0 - 149 mg/dL Final       Age         Desirable   Borderline High   High     Very High   0 D-90 D    19 - 174         ----         ----        ----  91 D- 9 Y     0 -  74        75 -  99     >/= 100      ----    10-19 Y     0 -  89        90 - 129     >/= 130      ----    20-24 Y     0 - 114       115 - 149     >/= 150      ----         >24 Y     0 - 149       150 - 199    200- 499    >/= 500    Venipuncture immediately after or during the administration of Metamizole may lead to falsely low results. Testing should be performed immediately prior to Metamizole dosing.    Non HDL Cholesterol 11/16/2023 100  0 - 149 mg/dL Final          Age       Desirable   Borderline High   High     Very High     0-19 Y     0 - 119       120 - 144     >/= 145    >/= 160    20-24 Y     0 - 149       150 - 189     >/= 190      ----         >24 Y    30 mg/dL above LDL Cholesterol goal      Thyroid Stimulating Hormone 11/16/2023 2.75  0.44 - 3.98 mIU/L Final    Vitamin D, 25-Hydroxy, Total 11/16/2023 33  30 - 100 ng/mL Final   Office Visit on 11/16/2023   Component Date Value Ref Range Status    POC Fingerstick  Blood Glucose 11/16/2023 123 (A)  70 - 100 mg/dl Final    POC HEMOGLOBIN A1c 11/16/2023 8.1 (A)  4.2 - 6.5 % Final      Review of Systems   Constitutional:  Negative for activity change.   HENT:  Negative for rhinorrhea, sinus pressure and sinus pain.    Eyes:  Positive for visual disturbance.   Respiratory:  Negative for chest tightness, shortness of breath and wheezing.    Cardiovascular:  Negative for palpitations.   Gastrointestinal:  Negative for abdominal distention, constipation and diarrhea.   Endocrine: Negative.         Night sweats   Genitourinary: Negative.    Allergic/Immunologic: Negative.    Neurological:  Negative for dizziness, syncope and light-headedness.   Hematological: Negative.    Psychiatric/Behavioral: Negative.     All other systems reviewed and are negative.      Objective   /72   Pulse 72   Temp 36.3 °C (97.4 °F)   Wt 70.6 kg (155 lb 9.6 oz)   SpO2 99%   BMI 33.67 kg/m²     Physical Exam  Vitals and nursing note reviewed.   Constitutional:       General: She is not in acute distress.     Appearance: Normal appearance. She is obese. She is not ill-appearing.   HENT:      Head: Normocephalic and atraumatic.      Right Ear: Tympanic membrane, ear canal and external ear normal.      Left Ear: Tympanic membrane, ear canal and external ear normal.      Nose: Nose normal.      Mouth/Throat:      Mouth: Mucous membranes are moist.      Pharynx: Oropharynx is clear.   Eyes:      Pupils: Pupils are equal, round, and reactive to light.   Cardiovascular:      Rate and Rhythm: Normal rate and regular rhythm.      Pulses: Normal pulses.      Heart sounds: Normal heart sounds. No murmur heard.  Pulmonary:      Effort: Pulmonary effort is normal. No respiratory distress.      Breath sounds: Normal breath sounds. No wheezing.   Abdominal:      General: Bowel sounds are normal. There is no distension.      Palpations: Abdomen is soft.      Tenderness: There is no abdominal tenderness.    Musculoskeletal:         General: Normal range of motion.      Cervical back: Normal range of motion and neck supple.      Comments: Uses walker   Skin:     General: Skin is warm and dry.      Capillary Refill: Capillary refill takes less than 2 seconds.      Coloration: Skin is not jaundiced.   Neurological:      General: No focal deficit present.      Mental Status: She is alert and oriented to person, place, and time.      Motor: No weakness.   Psychiatric:         Mood and Affect: Mood normal.         Behavior: Behavior normal.         Thought Content: Thought content normal.         Judgment: Judgment normal.         Assessment/Plan     # Hypertension, controlled  -Continue lisinopril 40 mg daily  -Low fat/cholesterol, low sodium, low carbohydrate diet  -Exercise as tolerated 150 minutes/week, increase activity slowly  -Weight loss  #Physical deconditioning, improving  -Receiving PT/OT at assisted living  # Diabetes, controlled, A1c 7.2 %  -Continue glimepiride 4 mg 2 x daily and increase Lantus to 30 units at bedtime  -Check FBS daily, bring record to next visit; check BS as needed for low BS or other concerns  -Low fat/cholesterol, low sodium, carbohydrate controlled diet  # Bilateral PEs  -Continue Eliquis 5 mg twice a day  -follow with hematology as needed  # Osteoporosis  -continue alendronate 70 mg weekly  -weight bearing exercises  -DEXA due 2024  # Lumbar stenosis, back pain, Neuropathy  -Patient may proceed with injection after holding Eliquis for 3 days  -follow with pain management  # Macular degeneration, legally blind  -follow with opthalmology  # Depression, chronic musculoskeletal pain, controlled  -Continue Cymbalta 30 mg daily  -Continue PT/OT  # Hypothyroidism  -Continue levothyroxine 25 mcg daily  -monitor TSH  # Obesity, BMI 33  -Avoid sweets and sugary drinks  -Drink plenty of water  -Avoid processed foods and refined foods  -Eat fruits, vegetables, lean protein, whole grains  -Increase  your activity level  # GERD, improved  -Continue omeprazole 20 mg daily  -avoid alcohol, caffeine, acidic foods, other foods that tend to bother your stomach  -Elevate head of bed 4 to 6 inches on blocks  -Avoid eating 2 to 3 hours prior to bedtime  -Do not lay down after eating  -Do not wear constricting clothing around your middle  # Night sweats  -Check UA for UTI    Follow-up in 6 months and as needed  Patient was identified as a fall risk. Risk prevention instructions provided.

## 2024-02-12 ENCOUNTER — OFFICE VISIT (OUTPATIENT)
Dept: PRIMARY CARE | Facility: CLINIC | Age: 89
End: 2024-02-12
Payer: MEDICARE

## 2024-02-12 VITALS
BODY MASS INDEX: 33.67 KG/M2 | TEMPERATURE: 97.4 F | SYSTOLIC BLOOD PRESSURE: 132 MMHG | HEART RATE: 72 BPM | DIASTOLIC BLOOD PRESSURE: 72 MMHG | WEIGHT: 155.6 LBS | OXYGEN SATURATION: 99 %

## 2024-02-12 DIAGNOSIS — R11.0 NAUSEA: ICD-10-CM

## 2024-02-12 DIAGNOSIS — E78.00 HYPERCHOLESTEROLEMIA: ICD-10-CM

## 2024-02-12 DIAGNOSIS — M81.0 OSTEOPOROSIS, UNSPECIFIED OSTEOPOROSIS TYPE, UNSPECIFIED PATHOLOGICAL FRACTURE PRESENCE: ICD-10-CM

## 2024-02-12 DIAGNOSIS — Z79.4 TYPE 2 DIABETES MELLITUS WITH HYPERGLYCEMIA, WITH LONG-TERM CURRENT USE OF INSULIN (MULTI): Primary | ICD-10-CM

## 2024-02-12 DIAGNOSIS — E11.9 TYPE 2 DIABETES MELLITUS WITHOUT COMPLICATIONS (MULTI): ICD-10-CM

## 2024-02-12 DIAGNOSIS — I10 PRIMARY HYPERTENSION: ICD-10-CM

## 2024-02-12 DIAGNOSIS — R42 VERTIGO: ICD-10-CM

## 2024-02-12 DIAGNOSIS — K21.9 GASTROESOPHAGEAL REFLUX DISEASE, UNSPECIFIED WHETHER ESOPHAGITIS PRESENT: ICD-10-CM

## 2024-02-12 DIAGNOSIS — E03.9 HYPOTHYROIDISM, ADULT: ICD-10-CM

## 2024-02-12 DIAGNOSIS — E66.9 CLASS 1 OBESITY WITH SERIOUS COMORBIDITY AND BODY MASS INDEX (BMI) OF 33.0 TO 33.9 IN ADULT, UNSPECIFIED OBESITY TYPE: ICD-10-CM

## 2024-02-12 DIAGNOSIS — T78.40XD ALLERGY, SUBSEQUENT ENCOUNTER: ICD-10-CM

## 2024-02-12 DIAGNOSIS — I26.99 PULMONARY EMBOLISM, BILATERAL (MULTI): ICD-10-CM

## 2024-02-12 DIAGNOSIS — R39.9 URINARY SYMPTOM OR SIGN: ICD-10-CM

## 2024-02-12 DIAGNOSIS — F32.5 DEPRESSION, MAJOR, IN REMISSION (CMS-HCC): ICD-10-CM

## 2024-02-12 DIAGNOSIS — F32.A DEPRESSION, UNSPECIFIED DEPRESSION TYPE: ICD-10-CM

## 2024-02-12 DIAGNOSIS — M54.50 BACK PAIN, LUMBOSACRAL: ICD-10-CM

## 2024-02-12 DIAGNOSIS — E11.42 DIABETIC POLYNEUROPATHY ASSOCIATED WITH TYPE 2 DIABETES MELLITUS (MULTI): ICD-10-CM

## 2024-02-12 DIAGNOSIS — H35.3231 EXUDATIVE AGE-RELATED MACULAR DEGENERATION, BILATERAL, WITH ACTIVE CHOROIDAL NEOVASCULARIZATION (MULTI): ICD-10-CM

## 2024-02-12 DIAGNOSIS — E55.9 VITAMIN D DEFICIENCY: ICD-10-CM

## 2024-02-12 DIAGNOSIS — E11.65 TYPE 2 DIABETES MELLITUS WITH HYPERGLYCEMIA, WITH LONG-TERM CURRENT USE OF INSULIN (MULTI): Primary | ICD-10-CM

## 2024-02-12 DIAGNOSIS — M46.1 BILATERAL SACROILIITIS (CMS-HCC): ICD-10-CM

## 2024-02-12 LAB
POC FINGERSTICK BLOOD GLUCOSE: 121 MG/DL (ref 70–100)
POC HEMOGLOBIN A1C: 7.2 % (ref 4.2–6.5)

## 2024-02-12 PROCEDURE — 1160F RVW MEDS BY RX/DR IN RCRD: CPT | Performed by: NURSE PRACTITIONER

## 2024-02-12 PROCEDURE — 1036F TOBACCO NON-USER: CPT | Performed by: NURSE PRACTITIONER

## 2024-02-12 PROCEDURE — 1125F AMNT PAIN NOTED PAIN PRSNT: CPT | Performed by: NURSE PRACTITIONER

## 2024-02-12 PROCEDURE — 1159F MED LIST DOCD IN RCRD: CPT | Performed by: NURSE PRACTITIONER

## 2024-02-12 PROCEDURE — 99214 OFFICE O/P EST MOD 30 MIN: CPT | Performed by: NURSE PRACTITIONER

## 2024-02-12 PROCEDURE — 3075F SYST BP GE 130 - 139MM HG: CPT | Performed by: NURSE PRACTITIONER

## 2024-02-12 PROCEDURE — 3078F DIAST BP <80 MM HG: CPT | Performed by: NURSE PRACTITIONER

## 2024-02-12 PROCEDURE — 83036 HEMOGLOBIN GLYCOSYLATED A1C: CPT | Performed by: NURSE PRACTITIONER

## 2024-02-12 PROCEDURE — 82962 GLUCOSE BLOOD TEST: CPT | Performed by: NURSE PRACTITIONER

## 2024-02-12 RX ORDER — MECLIZINE HYDROCHLORIDE 25 MG/1
25 TABLET ORAL 3 TIMES DAILY PRN
Qty: 30 TABLET | Refills: 1 | Status: SHIPPED | OUTPATIENT
Start: 2024-02-12

## 2024-02-12 RX ORDER — FOLIC ACID 1 MG/1
1 TABLET ORAL DAILY
Qty: 90 TABLET | Refills: 1 | Status: SHIPPED | OUTPATIENT
Start: 2024-02-12

## 2024-02-12 RX ORDER — LEVOTHYROXINE SODIUM 25 UG/1
25 TABLET ORAL DAILY
Qty: 90 TABLET | Refills: 1 | Status: SHIPPED | OUTPATIENT
Start: 2024-02-12

## 2024-02-12 RX ORDER — ROSUVASTATIN CALCIUM 20 MG/1
20 TABLET, COATED ORAL DAILY
Qty: 90 TABLET | Refills: 1 | Status: SHIPPED | OUTPATIENT
Start: 2024-02-12

## 2024-02-12 RX ORDER — FLUTICASONE PROPIONATE 50 MCG
2 SPRAY, SUSPENSION (ML) NASAL DAILY
Qty: 48 ML | Refills: 1 | Status: SHIPPED | OUTPATIENT
Start: 2024-02-12

## 2024-02-12 RX ORDER — BLOOD-GLUCOSE METER
EACH MISCELLANEOUS
Qty: 100 EACH | Refills: 1 | Status: SHIPPED | OUTPATIENT
Start: 2024-02-12

## 2024-02-12 RX ORDER — DULOXETIN HYDROCHLORIDE 30 MG/1
30 CAPSULE, DELAYED RELEASE ORAL DAILY
Qty: 90 CAPSULE | Refills: 1 | Status: SHIPPED | OUTPATIENT
Start: 2024-02-12

## 2024-02-12 RX ORDER — GLIMEPIRIDE 2 MG/1
2 TABLET ORAL 2 TIMES DAILY
Qty: 180 TABLET | Refills: 1 | Status: SHIPPED | OUTPATIENT
Start: 2024-02-12

## 2024-02-12 RX ORDER — OMEPRAZOLE 20 MG/1
20 CAPSULE, DELAYED RELEASE ORAL
Qty: 90 CAPSULE | Refills: 1 | Status: SHIPPED | OUTPATIENT
Start: 2024-02-12

## 2024-02-12 RX ORDER — PEN NEEDLE, DIABETIC 30 GX3/16"
NEEDLE, DISPOSABLE MISCELLANEOUS
Qty: 100 EACH | Refills: 1 | Status: SHIPPED | OUTPATIENT
Start: 2024-02-12

## 2024-02-12 RX ORDER — ALENDRONATE SODIUM 70 MG/1
70 TABLET ORAL
Qty: 12 TABLET | Refills: 1 | Status: SHIPPED | OUTPATIENT
Start: 2024-02-12

## 2024-02-12 RX ORDER — LISINOPRIL 40 MG/1
40 TABLET ORAL DAILY
Qty: 90 TABLET | Refills: 1 | Status: SHIPPED | OUTPATIENT
Start: 2024-02-12

## 2024-02-12 RX ORDER — VIT C/E/ZN/COPPR/LUTEIN/ZEAXAN 250MG-90MG
25 CAPSULE ORAL DAILY
Qty: 90 CAPSULE | Refills: 1 | Status: SHIPPED | OUTPATIENT
Start: 2024-02-12

## 2024-02-12 RX ORDER — ONDANSETRON 4 MG/1
4 TABLET, ORALLY DISINTEGRATING ORAL EVERY 8 HOURS PRN
Qty: 20 TABLET | Refills: 0 | Status: SHIPPED | OUTPATIENT
Start: 2024-02-12 | End: 2024-03-06 | Stop reason: ALTCHOICE

## 2024-02-12 RX ORDER — INSULIN GLARGINE 100 [IU]/ML
30 INJECTION, SOLUTION SUBCUTANEOUS NIGHTLY
Qty: 30 ML | Refills: 1 | Status: SHIPPED | OUTPATIENT
Start: 2024-02-12

## 2024-02-12 ASSESSMENT — ENCOUNTER SYMPTOMS: ENDOCRINE COMMENTS: NIGHT SWEATS

## 2024-02-12 NOTE — PATIENT INSTRUCTIONS

## 2024-02-26 ENCOUNTER — APPOINTMENT (OUTPATIENT)
Dept: RADIOLOGY | Facility: HOSPITAL | Age: 89
End: 2024-02-26
Payer: MEDICARE

## 2024-02-26 ENCOUNTER — APPOINTMENT (OUTPATIENT)
Dept: CARDIOLOGY | Facility: HOSPITAL | Age: 89
End: 2024-02-26
Payer: MEDICARE

## 2024-02-26 ENCOUNTER — HOSPITAL ENCOUNTER (EMERGENCY)
Facility: HOSPITAL | Age: 89
Discharge: HOME | End: 2024-02-26
Attending: STUDENT IN AN ORGANIZED HEALTH CARE EDUCATION/TRAINING PROGRAM
Payer: MEDICARE

## 2024-02-26 VITALS
OXYGEN SATURATION: 98 % | DIASTOLIC BLOOD PRESSURE: 102 MMHG | HEART RATE: 76 BPM | TEMPERATURE: 97.5 F | WEIGHT: 162.1 LBS | BODY MASS INDEX: 34.97 KG/M2 | SYSTOLIC BLOOD PRESSURE: 171 MMHG | RESPIRATION RATE: 16 BRPM | HEIGHT: 57 IN

## 2024-02-26 DIAGNOSIS — R07.9 CHEST PAIN, UNSPECIFIED TYPE: Primary | ICD-10-CM

## 2024-02-26 DIAGNOSIS — U07.1 COVID-19: ICD-10-CM

## 2024-02-26 LAB
ALBUMIN SERPL BCP-MCNC: 4.3 G/DL (ref 3.4–5)
ALP SERPL-CCNC: 55 U/L (ref 33–136)
ALT SERPL W P-5'-P-CCNC: 13 U/L (ref 7–45)
ANION GAP SERPL CALC-SCNC: 11 MMOL/L (ref 10–20)
AST SERPL W P-5'-P-CCNC: 16 U/L (ref 9–39)
BASOPHILS # BLD AUTO: 0.05 X10*3/UL (ref 0–0.1)
BASOPHILS NFR BLD AUTO: 0.7 %
BILIRUB SERPL-MCNC: 0.3 MG/DL (ref 0–1.2)
BNP SERPL-MCNC: 34 PG/ML (ref 0–99)
BUN SERPL-MCNC: 14 MG/DL (ref 6–23)
CALCIUM SERPL-MCNC: 9.3 MG/DL (ref 8.6–10.3)
CARDIAC TROPONIN I PNL SERPL HS: 5 NG/L (ref 0–13)
CARDIAC TROPONIN I PNL SERPL HS: 8 NG/L (ref 0–13)
CHLORIDE SERPL-SCNC: 102 MMOL/L (ref 98–107)
CO2 SERPL-SCNC: 27 MMOL/L (ref 21–32)
CREAT SERPL-MCNC: 0.87 MG/DL (ref 0.5–1.05)
D DIMER PPP FEU-MCNC: 318 NG/ML FEU
EGFRCR SERPLBLD CKD-EPI 2021: 63 ML/MIN/1.73M*2
EOSINOPHIL # BLD AUTO: 0.22 X10*3/UL (ref 0–0.4)
EOSINOPHIL NFR BLD AUTO: 2.9 %
ERYTHROCYTE [DISTWIDTH] IN BLOOD BY AUTOMATED COUNT: 16.3 % (ref 11.5–14.5)
FLUAV RNA RESP QL NAA+PROBE: NOT DETECTED
FLUBV RNA RESP QL NAA+PROBE: NOT DETECTED
GLUCOSE SERPL-MCNC: 206 MG/DL (ref 74–99)
HCT VFR BLD AUTO: 35.8 % (ref 36–46)
HGB BLD-MCNC: 10.9 G/DL (ref 12–16)
IMM GRANULOCYTES # BLD AUTO: 0.02 X10*3/UL (ref 0–0.5)
IMM GRANULOCYTES NFR BLD AUTO: 0.3 % (ref 0–0.9)
LIPASE SERPL-CCNC: 40 U/L (ref 9–82)
LYMPHOCYTES # BLD AUTO: 2.11 X10*3/UL (ref 0.8–3)
LYMPHOCYTES NFR BLD AUTO: 28.2 %
MCH RBC QN AUTO: 26.8 PG (ref 26–34)
MCHC RBC AUTO-ENTMCNC: 30.4 G/DL (ref 32–36)
MCV RBC AUTO: 88 FL (ref 80–100)
MONOCYTES # BLD AUTO: 0.57 X10*3/UL (ref 0.05–0.8)
MONOCYTES NFR BLD AUTO: 7.6 %
NEUTROPHILS # BLD AUTO: 4.5 X10*3/UL (ref 1.6–5.5)
NEUTROPHILS NFR BLD AUTO: 60.3 %
NRBC BLD-RTO: 0 /100 WBCS (ref 0–0)
PLATELET # BLD AUTO: 231 X10*3/UL (ref 150–450)
POTASSIUM SERPL-SCNC: 4.1 MMOL/L (ref 3.5–5.3)
PROT SERPL-MCNC: 7 G/DL (ref 6.4–8.2)
RBC # BLD AUTO: 4.07 X10*6/UL (ref 4–5.2)
RSV RNA RESP QL NAA+PROBE: NOT DETECTED
SARS-COV-2 RNA RESP QL NAA+PROBE: DETECTED
SODIUM SERPL-SCNC: 136 MMOL/L (ref 136–145)
WBC # BLD AUTO: 7.5 X10*3/UL (ref 4.4–11.3)

## 2024-02-26 PROCEDURE — 84484 ASSAY OF TROPONIN QUANT: CPT

## 2024-02-26 PROCEDURE — 84075 ASSAY ALKALINE PHOSPHATASE: CPT

## 2024-02-26 PROCEDURE — 83880 ASSAY OF NATRIURETIC PEPTIDE: CPT

## 2024-02-26 PROCEDURE — 85025 COMPLETE CBC W/AUTO DIFF WBC: CPT

## 2024-02-26 PROCEDURE — 99283 EMERGENCY DEPT VISIT LOW MDM: CPT | Mod: 25

## 2024-02-26 PROCEDURE — 2500000001 HC RX 250 WO HCPCS SELF ADMINISTERED DRUGS (ALT 637 FOR MEDICARE OP)

## 2024-02-26 PROCEDURE — 36415 COLL VENOUS BLD VENIPUNCTURE: CPT

## 2024-02-26 PROCEDURE — 71045 X-RAY EXAM CHEST 1 VIEW: CPT | Performed by: RADIOLOGY

## 2024-02-26 PROCEDURE — 83690 ASSAY OF LIPASE: CPT

## 2024-02-26 PROCEDURE — 93005 ELECTROCARDIOGRAM TRACING: CPT

## 2024-02-26 PROCEDURE — 87637 SARSCOV2&INF A&B&RSV AMP PRB: CPT

## 2024-02-26 PROCEDURE — 71045 X-RAY EXAM CHEST 1 VIEW: CPT

## 2024-02-26 PROCEDURE — 85379 FIBRIN DEGRADATION QUANT: CPT

## 2024-02-26 RX ORDER — CYCLOBENZAPRINE HCL 5 MG
5 TABLET ORAL ONCE
Status: COMPLETED | OUTPATIENT
Start: 2024-02-26 | End: 2024-02-26

## 2024-02-26 RX ORDER — CYCLOBENZAPRINE HCL 10 MG
5 TABLET ORAL 2 TIMES DAILY PRN
Qty: 4 TABLET | Refills: 0 | Status: SHIPPED | OUTPATIENT
Start: 2024-02-26 | End: 2024-03-05 | Stop reason: ALTCHOICE

## 2024-02-26 RX ORDER — ACETAMINOPHEN 325 MG/1
650 TABLET ORAL ONCE
Status: COMPLETED | OUTPATIENT
Start: 2024-02-26 | End: 2024-02-26

## 2024-02-26 RX ADMIN — ACETAMINOPHEN 650 MG: 325 TABLET ORAL at 18:15

## 2024-02-26 RX ADMIN — CYCLOBENZAPRINE HYDROCHLORIDE 5 MG: 5 TABLET, FILM COATED ORAL at 18:15

## 2024-02-26 ASSESSMENT — PAIN DESCRIPTION - PAIN TYPE: TYPE: ACUTE PAIN

## 2024-02-26 ASSESSMENT — PAIN - FUNCTIONAL ASSESSMENT: PAIN_FUNCTIONAL_ASSESSMENT: 0-10

## 2024-02-26 ASSESSMENT — COLUMBIA-SUICIDE SEVERITY RATING SCALE - C-SSRS
6. HAVE YOU EVER DONE ANYTHING, STARTED TO DO ANYTHING, OR PREPARED TO DO ANYTHING TO END YOUR LIFE?: NO
1. IN THE PAST MONTH, HAVE YOU WISHED YOU WERE DEAD OR WISHED YOU COULD GO TO SLEEP AND NOT WAKE UP?: NO
2. HAVE YOU ACTUALLY HAD ANY THOUGHTS OF KILLING YOURSELF?: NO

## 2024-02-26 ASSESSMENT — PAIN DESCRIPTION - LOCATION: LOCATION: CHEST

## 2024-02-26 ASSESSMENT — PAIN SCALES - GENERAL: PAINLEVEL_OUTOF10: 7

## 2024-02-26 ASSESSMENT — PAIN DESCRIPTION - ORIENTATION: ORIENTATION: LEFT;ANTERIOR;POSTERIOR;LOWER

## 2024-02-26 NOTE — ED NOTES
Pt is from [facility] she came in via [facility] w/ c/o left sided chest pain that radiates down her side into her posterior left flank she took a nap this afternoon and woke up at 1500 w/ 7/10 hte pain is reproduced w/ movement she denied any injury trauma SOB N/V/D constipation fever or urinary symptoms pt is Ao x3 not to time but self place and situation this is baseline daughter at bedside call bell w/in reach safety maintained     Carly Herman RN  02/26/24 [id number]

## 2024-02-26 NOTE — ED PROVIDER NOTES
HPI   Chief Complaint   Patient presents with    Chest Pain     Left sided chest pain that radiates down the side and into her flank 7/10       Patient is a 89-year-old female with significant PMH of diabetes, PE, hypertension, hyperlipidemia presents to the ED with cc of chest pain times this afternoon.  Patient states she went for a nap where she was lying on her stomach and when she woke up she had left-sided back rating into her chest.  Patient states it is worse with movement.  Patient denies any known injury or falls.  Patient states she feels short of breath due to the pain.  Patient denies any fever chills, congestion cough pharyngitis nausea vomiting abdominal pain diarrhea constipation.  Patient denies any history of MI.  Patient does have history of blood clots denies any recent travel or surgery.  Patient denies any history of CHF COPD or asthma.  Patient is a former smoker quit in 1951 denies any alcohol or street drug abuse.                          [place] Coma Scale Score: 14                     Patient History   Past Medical History:   Diagnosis Date    Age-related cognitive decline 03/31/2015    Age-related cognitive decline    Body mass index (BMI) 32.0-32.9, adult 10/28/2020    Body mass index (BMI) of 32.0 to 32.9 in adult    Bursitis of right shoulder 08/21/2018    Subdeltoid bursitis of right shoulder joint    Encounter for general adult medical examination without abnormal findings 04/01/2016    Medicare annual wellness visit, subsequent    Encounter for other preprocedural examination 08/30/2016    Pre-operative clearance    Erythematous condition, unspecified 05/09/2018    Breast erythema    Mastitis without abscess 05/09/2018    Cellulitis of breast    Mastodynia 05/09/2018    Breast tenderness    Mastodynia 05/09/2018    Pain of left breast    Obesity, unspecified 08/02/2021    Class 1 obesity with serious comorbidity and body mass index (BMI) of 33.0 to 33.9 in adult, unspecified obesity  type    Obesity, unspecified 10/28/2020    Obesity (BMI 30-39.9)    Obesity, unspecified 09/15/2021    Class 1 obesity with serious comorbidity and body mass index (BMI) of 34.0 to 34.9 in adult, unspecified obesity type    Obesity, unspecified 05/03/2021    Class 1 obesity with serious comorbidity and body mass index (BMI) of 32.0 to 32.9 in adult, unspecified obesity type    Occipital neuralgia 05/12/2016    Bilateral occipital neuralgia    Other conditions influencing health status 01/28/2020    Uncontrolled type 2 diabetes mellitus with complication, without long-term current use of insulin    Other conditions influencing health status 04/07/2017    Myalgia and myositis    Pain in right shoulder 07/05/2018    Pain in joint of right shoulder    Pain in unspecified hip 12/29/2014    Hip pain    Personal history of other diseases of the female genital tract 05/09/2018    History of breast swelling    Personal history of other diseases of the musculoskeletal system and connective tissue     History of tendinitis    Personal history of other diseases of the nervous system and sense organs 08/23/2017    History of sleep apnea    Personal history of other diseases of the nervous system and sense organs 03/27/2014    History of sleep apnea    Personal history of other diseases of the respiratory system 06/27/2016    History of acute bronchitis    Personal history of other diseases of the respiratory system 06/19/2020    History of acute sinusitis    Personal history of other infectious and parasitic diseases 02/25/2016    History of viral encephalitis    Trigger finger, right middle finger 03/19/2020    Trigger middle finger of right hand    Trochanteric bursitis, left hip 06/03/2019    Trochanteric bursitis of left hip    Type 2 diabetes mellitus with other skin complications (CMS/[facility]) 05/21/2018    Uncontrolled type 2 diabetes mellitus with other skin complication, unspecified long term insulin use status     Unilateral primary osteoarthritis, right knee 2017    Arthritis of knee, right    Urinary tract infection, site not specified 10/31/2020    Acute UTI     Past Surgical History:   Procedure Laterality Date    BREAST SURGERY  2015    Breast Surgery    EYE SURGERY  2015    Eye Surgery    JOINT REPLACEMENT Right     knee replacement    KNEE ARTHROSCOPY W/ DEBRIDEMENT  2013    Knee Arthroscopy (Therapeutic)    MR NECK ANGIO WO IV CONTRAST  2016    MR NECK ANGIO WO IV CONTRAST 2016 [facility]    OTHER SURGICAL HISTORY  2013    Supracervical Hysterectomy Laparoscopic Uterus 250g Or Less    TONSILLECTOMY  2013    Tonsillectomy     Family History   Problem Relation Name Age of Onset    Brain cancer Father      Coronary artery disease Brother       Social History     Tobacco Use    Smoking status: Former     Types: Cigarettes     Quit date:      Years since quittin.2     Passive exposure: Past    Smokeless tobacco: Never   Vaping Use    Vaping Use: Never used   Substance Use Topics    Alcohol use: Never    Drug use: Never       Physical Exam   ED Triage Vitals [24 1543]   Temperature Heart Rate Respirations BP   36.4 °C (97.5 °F) 76 16 (!) 171/102      Pulse Ox Temp Source Heart Rate Source Patient Position   98 % Temporal Monitor Sitting      BP Location FiO2 (%)     Right arm --       Physical Exam  HENT:      Head: Normocephalic.   Eyes:      Extraocular Movements: Extraocular movements intact.      Pupils: Pupils are equal, round, and reactive to light.   Cardiovascular:      Rate and Rhythm: Normal rate and regular rhythm.      Heart sounds: Normal heart sounds.   Pulmonary:      Effort: Pulmonary effort is normal.      Breath sounds: No decreased breath sounds, wheezing or rhonchi.   Chest:      Chest wall: Tenderness present.   Abdominal:      Palpations: Abdomen is soft.      Tenderness: There is no abdominal tenderness. There is no guarding or rebound.    Musculoskeletal:         General: Normal range of motion.      Cervical back: Normal range of motion.      Right lower leg: No edema.      Left lower leg: No edema.   Skin:     General: Skin is warm.      Capillary Refill: Capillary refill takes less than 2 seconds.   Neurological:      General: No focal deficit present.      Mental Status: She is alert.         ED Course & MDM   Diagnoses as of 02/26/24 1814   Chest pain, unspecified type   COVID-19       Medical Decision Making  Medical Decision Making:  Patient presented as described in HPI. Patient case including ROS, PE, and treatment and plan discussed with ED attending if attached as cosigner. Due to patients presentation orders completed include as documented.  Patient presents ED with cc of chest pain times this afternoon.  Patient states it is worse with movement.  Patient states it is the left side of her back into her left chest.  Patient denies any injury to the area.  Patient denies any history of MI recent travel or surgery.  Patient does have history of PEs.  Patient is nontoxic-appearing, reproducible pain on palpation to the chest wall lung sounds are clear bilaterally no peripheral edema abdomen is soft and nontender.  Patient given Tylenol here.  Troponin D-dimer flu RSV BNP lipase CMP CBC all within normal limits.  COVID-positive.  X-ray reveals poor inspiration bibasilar streaky densities which may be related to atelectasis or infiltrates.  Patient educated on these findings.  Patient educated follow-up with primary doctor.  Patient given muscle relaxant here.  Patient educated on any worsening symptoms to return and discharged home.  Patient was advised to follow up with PCP or recommended provider in 2-3 days for another evaluation and exam. I advised patient/guardian to return or go to closest emergency room immediately if symptoms change, get worse, new symptoms develop prior to follow up. If there is no improvement in symptoms in the  next 24 hours they are advised to return for further evaluation and exam. I also explained the plan and treatment course. Patient/guardian is in agreement with plan, treatment course, and follow up and states verbally that they will comply.    EKG interpretation performed at 1547 normal sinus rhythm, normal axis, no acute signs of ischemia.  Ventricular rate 76 bpm.    Patient care discussed with: N/A  Social Determinants affecting care: N/A    Final diagnosis and disposition as below.  See CI    Homegoing. I discussed the differential; results and discharge plan with the patient and/or family/friend/caregiver if present.  I emphasized the importance of follow-up with the physician I referred them to in the timeframe recommended.  I explained reasons for the patient to return to the Emergency Department. They agreed that if they feel their condition is worsening or if they have any other concern they should call 911 immediately for further assistance. I gave the patient an opportunity to ask all questions they had and answered all of them accordingly. They understand return precautions and discharge instructions. The patient and/or family/friend/caregiver expressed understanding verbally and that they would comply.       Disposition:  Discharge      This note has been transcribed using voice recognition and may contain grammatical errors, misplaced words, incorrect words, incorrect phrases or other errors.          Procedure  Procedures     Sonia Posey PA-C  02/26/24 1826

## 2024-02-28 ENCOUNTER — TELEPHONE (OUTPATIENT)
Dept: OPERATING ROOM | Facility: HOSPITAL | Age: 89
End: 2024-02-28
Payer: MEDICARE

## 2024-03-03 LAB
ATRIAL RATE: 76 BPM
P AXIS: 27 DEGREES
P OFFSET: 163 MS
P ONSET: 124 MS
PR INTERVAL: 188 MS
Q ONSET: 218 MS
QRS COUNT: 12 BEATS
QRS DURATION: 78 MS
QT INTERVAL: 382 MS
QTC CALCULATION(BAZETT): 429 MS
QTC FREDERICIA: 413 MS
R AXIS: 26 DEGREES
T AXIS: 74 DEGREES
T OFFSET: 409 MS
VENTRICULAR RATE: 76 BPM

## 2024-03-05 ENCOUNTER — OFFICE VISIT (OUTPATIENT)
Dept: PAIN MEDICINE | Facility: HOSPITAL | Age: 89
End: 2024-03-05
Payer: MEDICARE

## 2024-03-05 VITALS
HEIGHT: 57 IN | WEIGHT: 159 LBS | SYSTOLIC BLOOD PRESSURE: 122 MMHG | TEMPERATURE: 97.9 F | BODY MASS INDEX: 34.3 KG/M2 | DIASTOLIC BLOOD PRESSURE: 71 MMHG | HEART RATE: 80 BPM

## 2024-03-05 DIAGNOSIS — M54.50 CHRONIC BILATERAL LOW BACK PAIN WITHOUT SCIATICA: ICD-10-CM

## 2024-03-05 DIAGNOSIS — M79.18 MYOFASCIAL PAIN: ICD-10-CM

## 2024-03-05 DIAGNOSIS — G89.29 CHRONIC BILATERAL LOW BACK PAIN WITHOUT SCIATICA: ICD-10-CM

## 2024-03-05 DIAGNOSIS — M47.817 FACET ARTHROPATHY, LUMBOSACRAL: Primary | ICD-10-CM

## 2024-03-05 DIAGNOSIS — Z79.899 MEDICATION MANAGEMENT: ICD-10-CM

## 2024-03-05 LAB
AMPHETAMINES UR QL SCN: NORMAL
BARBITURATES UR QL SCN: NORMAL
BENZODIAZ UR QL SCN: NORMAL
BZE UR QL SCN: NORMAL
CANNABINOIDS UR QL SCN: NORMAL
FENTANYL+NORFENTANYL UR QL SCN: NORMAL
METHADONE UR QL SCN: NORMAL
OPIATES UR QL SCN: NORMAL
OXYCODONE+OXYMORPHONE UR QL SCN: NORMAL
PCP UR QL SCN: NORMAL

## 2024-03-05 PROCEDURE — 3078F DIAST BP <80 MM HG: CPT | Performed by: NURSE PRACTITIONER

## 2024-03-05 PROCEDURE — 1160F RVW MEDS BY RX/DR IN RCRD: CPT | Performed by: NURSE PRACTITIONER

## 2024-03-05 PROCEDURE — 99214 OFFICE O/P EST MOD 30 MIN: CPT | Mod: ZK | Performed by: NURSE PRACTITIONER

## 2024-03-05 PROCEDURE — 99214 OFFICE O/P EST MOD 30 MIN: CPT | Performed by: NURSE PRACTITIONER

## 2024-03-05 PROCEDURE — 1125F AMNT PAIN NOTED PAIN PRSNT: CPT | Performed by: NURSE PRACTITIONER

## 2024-03-05 PROCEDURE — 3074F SYST BP LT 130 MM HG: CPT | Performed by: NURSE PRACTITIONER

## 2024-03-05 PROCEDURE — 80307 DRUG TEST PRSMV CHEM ANLYZR: CPT | Performed by: NURSE PRACTITIONER

## 2024-03-05 PROCEDURE — 1123F ACP DISCUSS/DSCN MKR DOCD: CPT | Performed by: NURSE PRACTITIONER

## 2024-03-05 PROCEDURE — 1036F TOBACCO NON-USER: CPT | Performed by: NURSE PRACTITIONER

## 2024-03-05 PROCEDURE — 1159F MED LIST DOCD IN RCRD: CPT | Performed by: NURSE PRACTITIONER

## 2024-03-05 RX ORDER — TIZANIDINE 2 MG/1
2 TABLET ORAL 2 TIMES DAILY PRN
Qty: 60 TABLET | Refills: 2 | Status: SHIPPED | OUTPATIENT
Start: 2024-03-05

## 2024-03-05 ASSESSMENT — ENCOUNTER SYMPTOMS
JOINT SWELLING: 0
ALLERGIC/IMMUNOLOGIC NEGATIVE: 1
EYES NEGATIVE: 1
NECK PAIN: 0
CONSTITUTIONAL NEGATIVE: 1
CARDIOVASCULAR NEGATIVE: 1
ENDOCRINE NEGATIVE: 1
RESPIRATORY NEGATIVE: 1
MYALGIAS: 1
BACK PAIN: 1
PSYCHIATRIC NEGATIVE: 1
NEUROLOGICAL NEGATIVE: 1
ARTHRALGIAS: 1
NECK STIFFNESS: 0
GASTROINTESTINAL NEGATIVE: 1

## 2024-03-05 ASSESSMENT — PAIN SCALES - GENERAL: PAINLEVEL: 9

## 2024-03-05 NOTE — PATIENT INSTRUCTIONS
I started you on tizanidine and you may take it as needed for muscle pain relief.  Do not take sedating medications together.    Continue taking your duloxetine as prescribed by your PCP.  Do not take sedating medications together.    Take extra strength Tylenol for pain relief.    You are scheduled for bilateral L2-L3, L3-L4 diagnostic medial branch block #1 on 4/2/2024 in [place] with Dr. Way.  Stop Eliquis for 2 days.  The office will call you for further instructions.  You will receive Cipro before the procedure due to your history of right total knee replacement.  You will then be seen for your postprocedure follow-up in 3 to 4 weeks.

## 2024-03-05 NOTE — PROGRESS NOTES
"Subjective   Patient ID: Zhanna Perez \"Juan Alberto\" is a 89 y.o. female who presents for Follow-up (Post procedure follow up).    Zhanna is a pleasant 89-year-old  female who is here for postprocedure follow-up.  Patient presents in her own wheelchair.  She is accompanied by her daughter.  Patient currently stays at the [facility] in [place].    Patient had right L4-L5, L5-S1 RFA on 12/11/2023.  The procedure has improved her back pain.  It provided 80% relief.  She is still feeling better.  The procedure has improved her pain, sleep, and ability to participate in her daily activities.  She reports she is able to do her exercises at [facility].    Patient had bilateral SI joint injection done on 1/30/2024.  The injection has improved her back pain.  It provided 70 to 80% relief that lasted for a month.  The injection has improved her pain, the need for pain medication, and ability to participate in her daily activities.    Patient continues to have chronic lower back pain to both sides of her back.  She rates her pain as 6-7 out of 10 on average and 9 out of 10 most of the time when active.  Pain is constant.  She describes it as aching and burning kind of pain.  Back pain is aggravated with certain activities.  She denies pain, numbness or tingling sensation to her legs.  She denies leg weakness or change in balance.  She denies bowel or bladder incontinence.  She denies recent falls or injury.    Patient had an ED visit on 2/26/2024 for chest discomfort.  She reports she woke up from a sound sleep with her left side of her chest and back hurting.  She went to ED where her x-ray shows atelectasis or infiltrate.  Patient was also tested positive for COVID.  She reports that she was given Flexeril.  They think it was muscle related.  She denies hospital stay.  She denies shortness of breathing or chest pain.  She denies coughing, fever or GI issues.    Patient continues to take duloxetine as " prescribed by her PCP.  She takes Tylenol arthritis.  She denies side effects from her medications.  She had physical therapy in the past and she reports it did not help.    I discussed the plan of care including pharmacologic and joint interventional procedure.  I discussed lumbar facet and RFA.  She is in agreement to proceed to the first series of the diagnostic MBB.  This is done under fluoroscopy.  The plan is to do the lumbar RFA if the first 2 series are successful.  They voiced understanding.  Questions were answered during this encounter.     ---------  12/11/2023: Right L4-L5, L5-S1 RFA.  Preprocedure pain level 7-8/10, Postprocedure pain level 1-2/10  1/30/2024: Bilateral SI injection  ---------        Past Medical History  She has a past medical history of Age-related cognitive decline (03/31/2015), Body mass index (BMI) 32.0-32.9, adult (10/28/2020), Bursitis of right shoulder (08/21/2018), Encounter for general adult medical examination without abnormal findings (04/01/2016), Encounter for other preprocedural examination (08/30/2016), Erythematous condition, unspecified (05/09/2018), Mastitis without abscess (05/09/2018), Mastodynia (05/09/2018), Mastodynia (05/09/2018), Obesity, unspecified (08/02/2021), Obesity, unspecified (10/28/2020), Obesity, unspecified (09/15/2021), Obesity, unspecified (05/03/2021), Occipital neuralgia (05/12/2016), Other conditions influencing health status (01/28/2020), Other conditions influencing health status (04/07/2017), Pain in right shoulder (07/05/2018), Pain in unspecified hip (12/29/2014), Personal history of other diseases of the female genital tract (05/09/2018), Personal history of other diseases of the musculoskeletal system and connective tissue, Personal history of other diseases of the nervous system and sense organs (08/23/2017), Personal history of other diseases of the nervous system and sense organs (03/27/2014), Personal history of other diseases of the  respiratory system (06/27/2016), Personal history of other diseases of the respiratory system (06/19/2020), Personal history of other infectious and parasitic diseases (02/25/2016), Trigger finger, right middle finger (03/19/2020), Trochanteric bursitis, left hip (06/03/2019), 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    Surgical History  Past Surgical History:   Procedure Laterality Date    BREAST SURGERY  03/02/2015    Breast Surgery    EYE SURGERY  03/02/2015    Eye Surgery    JOINT REPLACEMENT Right     knee replacement    KNEE ARTHROSCOPY W/ DEBRIDEMENT  04/30/2013    Knee Arthroscopy (Therapeutic)    MR NECK ANGIO WO IV CONTRAST  02/26/2016    MR NECK ANGIO WO IV CONTRAST 2/26/2016 [facility]    OTHER SURGICAL HISTORY  04/30/2013    Supracervical Hysterectomy Laparoscopic Uterus 250g Or Less    TONSILLECTOMY  04/30/2013    Tonsillectomy        Social History  She reports that she quit smoking about 73 years ago. Her smoking use included cigarettes. She has been exposed to tobacco smoke. She has never used smokeless tobacco. She reports that she does not drink alcohol and does not use drugs.    Family History  Family History   Problem Relation Name Age of Onset    Brain cancer Father      Coronary artery disease Brother          Allergies  Codeine, Darvocet a500 [propoxyphene n-acetaminophen], Dilaudid [hydromorphone], Gabapentin, and Propoxyphene-acetaminophen      Current Outpatient Medications:     acetaminophen (TYLENOL ORAL), Take by mouth., Disp: , Rfl:     alendronate (Fosamax) 70 mg tablet, Take 1 tablet (70 mg) by mouth every 7 days. IN AM W/ 6-8OZ PLAIN WATER. DO NOT EAT/LIE DOWN FOR 30 MIN AFTER, Disp: 12 tablet, Rfl: 1    apixaban (Eliquis) 5 mg tablet, Take 1 tablet (5 mg) by mouth 2 times a day., Disp: 180 tablet, Rfl: 1    blood  "pressure monitor kit, Use to check BP daily and as needed, Disp: 1 kit, Rfl: 0    blood sugar diagnostic (Premier Test Strip) strip, TEST BLOOD SUGARonce daily. E11.9, Disp: 100 each, Rfl: 1    cholecalciferol (Vitamin D-3) 25 MCG (1000 UT) capsule, Take 1 capsule (25 mcg) by mouth once daily., Disp: 90 capsule, Rfl: 1    DULoxetine (Cymbalta) 30 mg DR capsule, Take 1 capsule (30 mg) by mouth once daily., Disp: 90 capsule, Rfl: 1    fluticasone (Flonase) 50 mcg/actuation nasal spray, Administer 2 sprays into each nostril once daily. Shake gently. Before first use, prime pump. After use, clean tip and replace cap., Disp: 48 mL, Rfl: 1    folic acid (Folvite) 1 mg tablet, Take 1 tablet (1 mg) by mouth once daily., Disp: 90 tablet, Rfl: 1    FreeStyle glucose monitoring kit, 1 each if needed., Disp: , Rfl:     glimepiride (Amaryl) 2 mg tablet, Take 1 tablet (2 mg) by mouth 2 times a day., Disp: 180 tablet, Rfl: 1    insulin glargine (Lantus Solostar U-100 Insulin) 100 unit/mL (3 mL) pen, Inject 30 Units under the skin once daily at bedtime. Take as directed per insulin instructions., Disp: 30 mL, Rfl: 1    levothyroxine (Synthroid, Levoxyl) 25 mcg tablet, Take 1 tablet (25 mcg) by mouth once daily., Disp: 90 tablet, Rfl: 1    lisinopril 40 mg tablet, Take 1 tablet (40 mg) by mouth once daily., Disp: 90 tablet, Rfl: 1    meclizine (Antivert) 25 mg tablet, Take 1 tablet (25 mg) by mouth 3 times a day as needed for dizziness., Disp: 30 tablet, Rfl: 1    omeprazole (PriLOSEC) 20 mg DR capsule, Take 1 capsule (20 mg) by mouth once daily in the morning. Take before meals. Do not crush or chew., Disp: 90 capsule, Rfl: 1    pen needle, diabetic (BD Ultra-Fine Mini Pen Needle) 31 gauge x 3/16\" needle, INJECT 1 EACH UNDER THE SKIN ONCE DAILY. USE AS INSTRUCTED. USE TO INJECT INSULIN, Disp: 100 each, Rfl: 1    rosuvastatin (Crestor) 20 mg tablet, Take 1 tablet (20 mg) by mouth once daily., Disp: 90 tablet, Rfl: 1    ondansetron " ODT (Zofran-ODT) 4 mg disintegrating tablet, Take 1 tablet (4 mg) by mouth every 8 hours if needed for nausea or vomiting., Disp: 20 tablet, Rfl: 0    tiZANidine (Zanaflex) 2 mg tablet, Take 1 tablet (2 mg) by mouth 2 times a day as needed for muscle spasms., Disp: 60 tablet, Rfl: 2     Review of Systems   Constitutional: Negative.    HENT: Negative.     Eyes: Negative.    Respiratory: Negative.     Cardiovascular: Negative.    Gastrointestinal: Negative.    Endocrine: Negative.    Genitourinary: Negative.    Musculoskeletal:  Positive for arthralgias, back pain and myalgias. Negative for gait problem, joint swelling, neck pain and neck stiffness.   Skin: Negative.    Allergic/Immunologic: Negative.    Neurological: Negative.    Psychiatric/Behavioral: Negative.          Physical Exam  Vitals and nursing note reviewed.   HENT:      Head: Normocephalic.      Nose: Nose normal.   Eyes:      Extraocular Movements: Extraocular movements intact.      Conjunctiva/sclera: Conjunctivae normal.      Pupils: Pupils are equal, round, and reactive to light.   Cardiovascular:      Rate and Rhythm: Normal rate and regular rhythm.   Pulmonary:      Effort: Pulmonary effort is normal.      Breath sounds: Normal breath sounds.   Musculoskeletal:         General: Tenderness present. No swelling, deformity or signs of injury.      Cervical back: No rigidity or tenderness.      Lumbar back: Tenderness present.      Right lower leg: No edema.      Left lower leg: No edema.      Comments: Negative right leg raise.  Positive left leg raise eliciting back pain.  Positive left Fabere's test eliciting back pain.  Positive for minimal left SI joint pain on palpation.  Positive for paraspinal tenderness at the lumbar region bilaterally at L2-L3, L3-L4 with rotation.  No radicular symptoms.  Negative for paraspinal tenderness at the right L4-L5, L5-S1 with rotation.  BUE 3-4/5, BLE 3-4/5.   Skin:     General: Skin is warm and dry.    Neurological:      General: No focal deficit present.      Mental Status: She is alert and oriented to person, place, and time.   Psychiatric:         Mood and Affect: Mood normal.         Behavior: Behavior normal.          Last Recorded Vitals  /71   Pulse 80   Temp 36.6 °C (97.9 °F) (Temporal)   Wt 72.1 kg (159 lb)     Relevant Results      Pain Management Panel  More data exists         Latest Ref Rng & Units 3/5/2024 12/4/2023   Pain Management Panel   Amphetamine Screen, Urine Presumptive Negative Presumptive Negative  Presumptive Negative    Barbiturate Screen, Urine Presumptive Negative Presumptive Negative  Presumptive Negative    Benzodiazepines Screen, Urine Presumptive Negative Presumptive Negative  Presumptive Negative    Fentanyl Screen, Urine Presumptive Negative Presumptive Negative  Presumptive Negative    Methadone Screen, Urine Presumptive Negative Presumptive Negative  -          Narrative & Impression   MRN: [id number]  Patient Name: PADMINI WATT     STUDY:  Lumbar Spine, 7 views.     INDICATION:  back  M48.061: Lumbar spinal stenosis M47.817: Lumbosacral  spondylosis.     COMPARISON:  03/13/2019.     ACCESSION NUMBER(S):  [id number]     ORDERING CLINICIAN:  ZACARIAS CROCKER     FINDINGS:  Mild dextroscoliosis centered in the lower thoracic region and mild  levoscoliosis centered in the mid lumbar spine.  Redemonstration of similar grade 1 L4-5 anterolisthesis. Moderate  facet arthropathy from L3-4 to L5-S1.  Moderate spondylosis at L2-3 and L3-4 with interval progression.  No spondylolysis or dynamic instability.  Atherosclerosis of the abdominal aorta.  Vertebral body heights are preserved. Posterior elements are intact.     IMPRESSION:  1. Multilevel lumbar spine degenerative changes as described above  with interval progression since 2019.  2. Mild S-shaped scoliosis of the lumbar spine.            Media Information              Assessment/Plan   Problem List Items Addressed This Visit              ICD-10-CM    Chronic bilateral low back pain without sciatica M54.50, G89.29    Relevant Orders     Medial Nerve Branch Block    Facet arthropathy, lumbosacral - Primary M47.817    Relevant Orders     Medial Nerve Branch Block    Myofascial pain M79.18    Relevant Medications    tiZANidine (Zanaflex) 2 mg tablet     Other Visit Diagnoses         Codes    Medication management     Z79.899    Relevant Orders    Drug Screen, Urine With Reflex to Confirmation (Completed)                   1. Facet arthropathy, lumbosacral  -  Medial Nerve Branch Block; Future    2. Chronic bilateral low back pain without sciatica  -  Medial Nerve Branch Block; Future    3. Myofascial pain  - tiZANidine (Zanaflex) 2 mg tablet; Take 1 tablet (2 mg) by mouth 2 times a day as needed for muscle spasms.  Dispense: 60 tablet; Refill: 2    4. Medication management  - Drug Screen, Urine With Reflex to Confirmation; Future  - Drug Screen, Urine With Reflex to Confirmation       Plan/Follow-up Instructions:     I started you on tizanidine and you may take it as needed for muscle pain relief.  Do not take sedating medications together.    Continue taking your duloxetine as prescribed by your PCP.  Do not take sedating medications together.    Take extra strength Tylenol for pain relief.    You are scheduled for bilateral L2-L3, L3-L4 diagnostic medial branch block #1 on 4/2/2024 in [place] with Dr. Way.  Stop Eliquis for 2 days.  The office will call you for further instructions.  You will receive Cipro before the procedure due to your history of right total knee replacement.  You will then be seen for your postprocedure follow-up in 3 to 4 weeks.      Disclaimer: This note was created using voice recognition software. It was not corrected for typographical or grammatical errors, inadvertent word insertion, or any unintended errors. Please feel free to contact me for clarification.        Rosaura Ferro, LIZBETH, APRN, FNP-C    [facility]  Office #: [phone]  Fax # [phone]

## 2024-03-05 NOTE — PROGRESS NOTES
I have personally reviewed the OAR[place] report for Zhanna Perez    Date of the last Controlled Substance Agreement: 12/4/2023       Last urine drug screening date/ordered today: 03/05/24     Results of last screen: Results as expected.       Last opioid risk screening date/ordered today: 12/4/2023      Pain Scale Screening:   Pain Assessment and Documentation Tool (PADT)   Date of Assessment: 3/5/2024  Analgesia:   Patient reports her pain level on average during the past week is 6on a 0 - 10 scale.   Patient reports that her pain level at its worst during the past week was 9 on a 0 -10 scale.   50% of pain has been relieved during the past week per patient   Patient states that the amount of pain relief she is now obtaining from her current pain reliever(s) is enough to make a real difference in her life.   Query to clinician: Is the patient's pain relief clinically significant? yes  Activities of Daily Living:   Physical functioning: worse  Family relationships: unchanged  Social relationships: unchanged  Mood: unchanged  Sleep patterns: unchanged  Overall functioning: better  Adverse Events: No, Zhanna Perez is not experiencing side effects from current pain reliever.  Patients overall severity of side effect:none  Specific Analgesic Plan: Continue present regimen.

## 2024-03-05 NOTE — H&P (VIEW-ONLY)
"Subjective   Patient ID: Zhanna Perez \"Juan Alberto\" is a 89 y.o. female who presents for Follow-up (Post procedure follow up).    Zhanna is a pleasant 89-year-old  female who is here for postprocedure follow-up.  Patient presents in her own wheelchair.  She is accompanied by her daughter.  Patient currently stays at the [facility] in [place].    Patient had right L4-L5, L5-S1 RFA on 12/11/2023.  The procedure has improved her back pain.  It provided 80% relief.  She is still feeling better.  The procedure has improved her pain, sleep, and ability to participate in her daily activities.  She reports she is able to do her exercises at [facility].    Patient had bilateral SI joint injection done on 1/30/2024.  The injection has improved her back pain.  It provided 70 to 80% relief that lasted for a month.  The injection has improved her pain, the need for pain medication, and ability to participate in her daily activities.    Patient continues to have chronic lower back pain to both sides of her back.  She rates her pain as 6-7 out of 10 on average and 9 out of 10 most of the time when active.  Pain is constant.  She describes it as aching and burning kind of pain.  Back pain is aggravated with certain activities.  She denies pain, numbness or tingling sensation to her legs.  She denies leg weakness or change in balance.  She denies bowel or bladder incontinence.  She denies recent falls or injury.    Patient had an ED visit on 2/26/2024 for chest discomfort.  She reports she woke up from a sound sleep with her left side of her chest and back hurting.  She went to ED where her x-ray shows atelectasis or infiltrate.  Patient was also tested positive for COVID.  She reports that she was given Flexeril.  They think it was muscle related.  She denies hospital stay.  She denies shortness of breathing or chest pain.  She denies coughing, fever or GI issues.    Patient continues to take duloxetine as " prescribed by her PCP.  She takes Tylenol arthritis.  She denies side effects from her medications.  She had physical therapy in the past and she reports it did not help.    I discussed the plan of care including pharmacologic and joint interventional procedure.  I discussed lumbar facet and RFA.  She is in agreement to proceed to the first series of the diagnostic MBB.  This is done under fluoroscopy.  The plan is to do the lumbar RFA if the first 2 series are successful.  They voiced understanding.  Questions were answered during this encounter.     ---------  12/11/2023: Right L4-L5, L5-S1 RFA.  Preprocedure pain level 7-8/10, Postprocedure pain level 1-2/10  1/30/2024: Bilateral SI injection  ---------        Past Medical History  She has a past medical history of Age-related cognitive decline (03/31/2015), Body mass index (BMI) 32.0-32.9, adult (10/28/2020), Bursitis of right shoulder (08/21/2018), Encounter for general adult medical examination without abnormal findings (04/01/2016), Encounter for other preprocedural examination (08/30/2016), Erythematous condition, unspecified (05/09/2018), Mastitis without abscess (05/09/2018), Mastodynia (05/09/2018), Mastodynia (05/09/2018), Obesity, unspecified (08/02/2021), Obesity, unspecified (10/28/2020), Obesity, unspecified (09/15/2021), Obesity, unspecified (05/03/2021), Occipital neuralgia (05/12/2016), Other conditions influencing health status (01/28/2020), Other conditions influencing health status (04/07/2017), Pain in right shoulder (07/05/2018), Pain in unspecified hip (12/29/2014), Personal history of other diseases of the female genital tract (05/09/2018), Personal history of other diseases of the musculoskeletal system and connective tissue, Personal history of other diseases of the nervous system and sense organs (08/23/2017), Personal history of other diseases of the nervous system and sense organs (03/27/2014), Personal history of other diseases of the  respiratory system (06/27/2016), Personal history of other diseases of the respiratory system (06/19/2020), Personal history of other infectious and parasitic diseases (02/25/2016), Trigger finger, right middle finger (03/19/2020), Trochanteric bursitis, left hip (06/03/2019), 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    Surgical History  Past Surgical History:   Procedure Laterality Date    BREAST SURGERY  03/02/2015    Breast Surgery    EYE SURGERY  03/02/2015    Eye Surgery    JOINT REPLACEMENT Right     knee replacement    KNEE ARTHROSCOPY W/ DEBRIDEMENT  04/30/2013    Knee Arthroscopy (Therapeutic)    MR NECK ANGIO WO IV CONTRAST  02/26/2016    MR NECK ANGIO WO IV CONTRAST 2/26/2016 [facility]    OTHER SURGICAL HISTORY  04/30/2013    Supracervical Hysterectomy Laparoscopic Uterus 250g Or Less    TONSILLECTOMY  04/30/2013    Tonsillectomy        Social History  She reports that she quit smoking about 73 years ago. Her smoking use included cigarettes. She has been exposed to tobacco smoke. She has never used smokeless tobacco. She reports that she does not drink alcohol and does not use drugs.    Family History  Family History   Problem Relation Name Age of Onset    Brain cancer Father      Coronary artery disease Brother          Allergies  Codeine, Darvocet a500 [propoxyphene n-acetaminophen], Dilaudid [hydromorphone], Gabapentin, and Propoxyphene-acetaminophen      Current Outpatient Medications:     acetaminophen (TYLENOL ORAL), Take by mouth., Disp: , Rfl:     alendronate (Fosamax) 70 mg tablet, Take 1 tablet (70 mg) by mouth every 7 days. IN AM W/ 6-8OZ PLAIN WATER. DO NOT EAT/LIE DOWN FOR 30 MIN AFTER, Disp: 12 tablet, Rfl: 1    apixaban (Eliquis) 5 mg tablet, Take 1 tablet (5 mg) by mouth 2 times a day., Disp: 180 tablet, Rfl: 1    blood  "pressure monitor kit, Use to check BP daily and as needed, Disp: 1 kit, Rfl: 0    blood sugar diagnostic (Premier Test Strip) strip, TEST BLOOD SUGARonce daily. E11.9, Disp: 100 each, Rfl: 1    cholecalciferol (Vitamin D-3) 25 MCG (1000 UT) capsule, Take 1 capsule (25 mcg) by mouth once daily., Disp: 90 capsule, Rfl: 1    DULoxetine (Cymbalta) 30 mg DR capsule, Take 1 capsule (30 mg) by mouth once daily., Disp: 90 capsule, Rfl: 1    fluticasone (Flonase) 50 mcg/actuation nasal spray, Administer 2 sprays into each nostril once daily. Shake gently. Before first use, prime pump. After use, clean tip and replace cap., Disp: 48 mL, Rfl: 1    folic acid (Folvite) 1 mg tablet, Take 1 tablet (1 mg) by mouth once daily., Disp: 90 tablet, Rfl: 1    FreeStyle glucose monitoring kit, 1 each if needed., Disp: , Rfl:     glimepiride (Amaryl) 2 mg tablet, Take 1 tablet (2 mg) by mouth 2 times a day., Disp: 180 tablet, Rfl: 1    insulin glargine (Lantus Solostar U-100 Insulin) 100 unit/mL (3 mL) pen, Inject 30 Units under the skin once daily at bedtime. Take as directed per insulin instructions., Disp: 30 mL, Rfl: 1    levothyroxine (Synthroid, Levoxyl) 25 mcg tablet, Take 1 tablet (25 mcg) by mouth once daily., Disp: 90 tablet, Rfl: 1    lisinopril 40 mg tablet, Take 1 tablet (40 mg) by mouth once daily., Disp: 90 tablet, Rfl: 1    meclizine (Antivert) 25 mg tablet, Take 1 tablet (25 mg) by mouth 3 times a day as needed for dizziness., Disp: 30 tablet, Rfl: 1    omeprazole (PriLOSEC) 20 mg DR capsule, Take 1 capsule (20 mg) by mouth once daily in the morning. Take before meals. Do not crush or chew., Disp: 90 capsule, Rfl: 1    pen needle, diabetic (BD Ultra-Fine Mini Pen Needle) 31 gauge x 3/16\" needle, INJECT 1 EACH UNDER THE SKIN ONCE DAILY. USE AS INSTRUCTED. USE TO INJECT INSULIN, Disp: 100 each, Rfl: 1    rosuvastatin (Crestor) 20 mg tablet, Take 1 tablet (20 mg) by mouth once daily., Disp: 90 tablet, Rfl: 1    ondansetron " ODT (Zofran-ODT) 4 mg disintegrating tablet, Take 1 tablet (4 mg) by mouth every 8 hours if needed for nausea or vomiting., Disp: 20 tablet, Rfl: 0    tiZANidine (Zanaflex) 2 mg tablet, Take 1 tablet (2 mg) by mouth 2 times a day as needed for muscle spasms., Disp: 60 tablet, Rfl: 2     Review of Systems   Constitutional: Negative.    HENT: Negative.     Eyes: Negative.    Respiratory: Negative.     Cardiovascular: Negative.    Gastrointestinal: Negative.    Endocrine: Negative.    Genitourinary: Negative.    Musculoskeletal:  Positive for arthralgias, back pain and myalgias. Negative for gait problem, joint swelling, neck pain and neck stiffness.   Skin: Negative.    Allergic/Immunologic: Negative.    Neurological: Negative.    Psychiatric/Behavioral: Negative.          Physical Exam  Vitals and nursing note reviewed.   HENT:      Head: Normocephalic.      Nose: Nose normal.   Eyes:      Extraocular Movements: Extraocular movements intact.      Conjunctiva/sclera: Conjunctivae normal.      Pupils: Pupils are equal, round, and reactive to light.   Cardiovascular:      Rate and Rhythm: Normal rate and regular rhythm.   Pulmonary:      Effort: Pulmonary effort is normal.      Breath sounds: Normal breath sounds.   Musculoskeletal:         General: Tenderness present. No swelling, deformity or signs of injury.      Cervical back: No rigidity or tenderness.      Lumbar back: Tenderness present.      Right lower leg: No edema.      Left lower leg: No edema.      Comments: Negative right leg raise.  Positive left leg raise eliciting back pain.  Positive left Fabere's test eliciting back pain.  Positive for minimal left SI joint pain on palpation.  Positive for paraspinal tenderness at the lumbar region bilaterally at L2-L3, L3-L4 with rotation.  No radicular symptoms.  Negative for paraspinal tenderness at the right L4-L5, L5-S1 with rotation.  BUE 3-4/5, BLE 3-4/5.   Skin:     General: Skin is warm and dry.    Neurological:      General: No focal deficit present.      Mental Status: She is alert and oriented to person, place, and time.   Psychiatric:         Mood and Affect: Mood normal.         Behavior: Behavior normal.          Last Recorded Vitals  /71   Pulse 80   Temp 36.6 °C (97.9 °F) (Temporal)   Wt 72.1 kg (159 lb)     Relevant Results      Pain Management Panel  More data exists         Latest Ref Rng & Units 3/5/2024 12/4/2023   Pain Management Panel   Amphetamine Screen, Urine Presumptive Negative Presumptive Negative  Presumptive Negative    Barbiturate Screen, Urine Presumptive Negative Presumptive Negative  Presumptive Negative    Benzodiazepines Screen, Urine Presumptive Negative Presumptive Negative  Presumptive Negative    Fentanyl Screen, Urine Presumptive Negative Presumptive Negative  Presumptive Negative    Methadone Screen, Urine Presumptive Negative Presumptive Negative  -          Narrative & Impression   MRN: [id number]  Patient Name: PADMINI WATT     STUDY:  Lumbar Spine, 7 views.     INDICATION:  back  M48.061: Lumbar spinal stenosis M47.817: Lumbosacral  spondylosis.     COMPARISON:  03/13/2019.     ACCESSION NUMBER(S):  [id number]     ORDERING CLINICIAN:  ZACARIAS CROCKER     FINDINGS:  Mild dextroscoliosis centered in the lower thoracic region and mild  levoscoliosis centered in the mid lumbar spine.  Redemonstration of similar grade 1 L4-5 anterolisthesis. Moderate  facet arthropathy from L3-4 to L5-S1.  Moderate spondylosis at L2-3 and L3-4 with interval progression.  No spondylolysis or dynamic instability.  Atherosclerosis of the abdominal aorta.  Vertebral body heights are preserved. Posterior elements are intact.     IMPRESSION:  1. Multilevel lumbar spine degenerative changes as described above  with interval progression since 2019.  2. Mild S-shaped scoliosis of the lumbar spine.            Media Information              Assessment/Plan   Problem List Items Addressed This Visit              ICD-10-CM    Chronic bilateral low back pain without sciatica M54.50, G89.29    Relevant Orders     Medial Nerve Branch Block    Facet arthropathy, lumbosacral - Primary M47.817    Relevant Orders     Medial Nerve Branch Block    Myofascial pain M79.18    Relevant Medications    tiZANidine (Zanaflex) 2 mg tablet     Other Visit Diagnoses         Codes    Medication management     Z79.899    Relevant Orders    Drug Screen, Urine With Reflex to Confirmation (Completed)                   1. Facet arthropathy, lumbosacral  -  Medial Nerve Branch Block; Future    2. Chronic bilateral low back pain without sciatica  -  Medial Nerve Branch Block; Future    3. Myofascial pain  - tiZANidine (Zanaflex) 2 mg tablet; Take 1 tablet (2 mg) by mouth 2 times a day as needed for muscle spasms.  Dispense: 60 tablet; Refill: 2    4. Medication management  - Drug Screen, Urine With Reflex to Confirmation; Future  - Drug Screen, Urine With Reflex to Confirmation       Plan/Follow-up Instructions:     I started you on tizanidine and you may take it as needed for muscle pain relief.  Do not take sedating medications together.    Continue taking your duloxetine as prescribed by your PCP.  Do not take sedating medications together.    Take extra strength Tylenol for pain relief.    You are scheduled for bilateral L2-L3, L3-L4 diagnostic medial branch block #1 on 4/2/2024 in [place] with Dr. Way.  Stop Eliquis for 2 days.  The office will call you for further instructions.  You will receive Cipro before the procedure due to your history of right total knee replacement.  You will then be seen for your postprocedure follow-up in 3 to 4 weeks.      Disclaimer: This note was created using voice recognition software. It was not corrected for typographical or grammatical errors, inadvertent word insertion, or any unintended errors. Please feel free to contact me for clarification.        Rosaura Ferro, LIZBETH, APRN, FNP-C    [facility]  Office #: [phone]  Fax # [phone]

## 2024-03-15 ENCOUNTER — APPOINTMENT (OUTPATIENT)
Dept: PAIN MEDICINE | Facility: HOSPITAL | Age: 89
End: 2024-03-15
Payer: MEDICARE

## 2024-03-27 ENCOUNTER — DOCUMENTATION (OUTPATIENT)
Dept: PAIN MEDICINE | Facility: HOSPITAL | Age: 89
End: 2024-03-27
Payer: MEDICARE

## 2024-04-02 ENCOUNTER — HOSPITAL ENCOUNTER (OUTPATIENT)
Dept: RADIOLOGY | Facility: HOSPITAL | Age: 89
Discharge: HOME | End: 2024-04-02
Payer: MEDICARE

## 2024-04-02 ENCOUNTER — HOSPITAL ENCOUNTER (OUTPATIENT)
Dept: PAIN MEDICINE | Facility: HOSPITAL | Age: 89
Discharge: HOME | End: 2024-04-02
Payer: MEDICARE

## 2024-04-02 VITALS
OXYGEN SATURATION: 98 % | WEIGHT: 159 LBS | DIASTOLIC BLOOD PRESSURE: 76 MMHG | BODY MASS INDEX: 34.3 KG/M2 | HEART RATE: 75 BPM | HEIGHT: 57 IN | SYSTOLIC BLOOD PRESSURE: 154 MMHG | RESPIRATION RATE: 16 BRPM | TEMPERATURE: 98.4 F

## 2024-04-02 DIAGNOSIS — R52 PAIN: ICD-10-CM

## 2024-04-02 DIAGNOSIS — M54.50 CHRONIC BILATERAL LOW BACK PAIN WITHOUT SCIATICA: ICD-10-CM

## 2024-04-02 DIAGNOSIS — M47.817 FACET ARTHROPATHY, LUMBOSACRAL: ICD-10-CM

## 2024-04-02 DIAGNOSIS — G89.29 CHRONIC BILATERAL LOW BACK PAIN WITHOUT SCIATICA: ICD-10-CM

## 2024-04-02 LAB — GLUCOSE BLD MANUAL STRIP-MCNC: 78 MG/DL (ref 74–99)

## 2024-04-02 PROCEDURE — 64494 INJ PARAVERT F JNT L/S 2 LEV: CPT | Performed by: ANESTHESIOLOGY

## 2024-04-02 PROCEDURE — 64494 INJ PARAVERT F JNT L/S 2 LEV: CPT | Mod: 50,KX | Performed by: ANESTHESIOLOGY

## 2024-04-02 PROCEDURE — 64493 INJ PARAVERT F JNT L/S 1 LEV: CPT | Performed by: ANESTHESIOLOGY

## 2024-04-02 PROCEDURE — 82947 ASSAY GLUCOSE BLOOD QUANT: CPT

## 2024-04-02 PROCEDURE — 64493 INJ PARAVERT F JNT L/S 1 LEV: CPT | Mod: 50,KX | Performed by: ANESTHESIOLOGY

## 2024-04-02 PROCEDURE — 2500000001 HC RX 250 WO HCPCS SELF ADMINISTERED DRUGS (ALT 637 FOR MEDICARE OP)

## 2024-04-02 RX ORDER — CIPROFLOXACIN 500 MG/1
500 TABLET ORAL ONCE
Status: DISCONTINUED | OUTPATIENT
Start: 2024-04-02 | End: 2024-04-03 | Stop reason: HOSPADM

## 2024-04-02 RX ORDER — CIPROFLOXACIN 500 MG/1
TABLET ORAL
Status: COMPLETED
Start: 2024-04-02 | End: 2024-04-02

## 2024-04-02 RX ADMIN — CIPROFLOXACIN 500 MG: 500 TABLET, FILM COATED ORAL at 10:43

## 2024-04-02 ASSESSMENT — COLUMBIA-SUICIDE SEVERITY RATING SCALE - C-SSRS
1. IN THE PAST MONTH, HAVE YOU WISHED YOU WERE DEAD OR WISHED YOU COULD GO TO SLEEP AND NOT WAKE UP?: NO
2. HAVE YOU ACTUALLY HAD ANY THOUGHTS OF KILLING YOURSELF?: NO
6. HAVE YOU EVER DONE ANYTHING, STARTED TO DO ANYTHING, OR PREPARED TO DO ANYTHING TO END YOUR LIFE?: NO

## 2024-04-02 ASSESSMENT — PAIN DESCRIPTION - DESCRIPTORS: DESCRIPTORS: ACHING

## 2024-04-02 ASSESSMENT — PAIN SCALES - GENERAL
PAINLEVEL_OUTOF10: 0 - NO PAIN
PAINLEVEL_OUTOF10: 8

## 2024-04-02 ASSESSMENT — PAIN - FUNCTIONAL ASSESSMENT
PAIN_FUNCTIONAL_ASSESSMENT: 0-10
PAIN_FUNCTIONAL_ASSESSMENT: 0-10

## 2024-04-02 NOTE — OP NOTE
"Date: 2024  OR Location: GEN NON-OR PROCEDURES    Name: Zhanna Perez \"Juan Alberto\", : 1932, Age: 89 y.o., MRN: [id number], Sex: female    Diagnosis   1. Facet arthropathy, lumbosacral   Medial Nerve Branch Block     Medial Nerve Branch Block      2. Chronic bilateral low back pain without sciatica   Medial Nerve Branch Block     Medial Nerve Branch Block        Operation Performed: Diagnostic Medial Branch Block    Facet joint injection with fluoroscopic guidance.  Preoperative/postop diagnosis:  Lumbar spondylosis without myelopathy L2-3, L3-4 complications: None  Estimated blood loss: None  Anesthesia: Local    Procedures     Procedure:  Patient was identified with name , , and medical record number.   Appropriate timeout was performed preoperatively.  The patient was taken to the operating room and placed in the prone position where sterile prep was done and drapes were applied. Supplemental oxygen was given to the patient at 2 L/m  per nasal cannula. Patients blood pressure was monitored throughout the case and remained stable. Pulse oximetry remained at 100% throughout the procedure. A  25-gauge 3 1/2 inch spinal needle was then inserted into the inferior aspect of the facet joints at L2-3, L3-4 bilateral. Aspiration was negative for the cerebral spinal fluid or blood. There were no paresthesias, dysesthesias, or severe pain noted. A total of 1mL of preservative-free 0.25% Marcaine was injected into each of the L2-3, L3-4 facet joints. The needles were then removed and a sterile bandage with Neosporin Ointment was applied over the puncture sites. The patient was taken to the recovery room in awake and in stable condition.    "

## 2024-04-02 NOTE — DISCHARGE INSTRUCTIONS
Discharge Instructions:  Keep band-aid on for the next 24 hours.  No showering/bathing for the next 24 hours. You may notice soreness or increased pain in the area of your injection, which may continue for the first 48 hours.  Avoid driving or operating any heavy machinery until the next day after the procedure.  You should resume any medications and your regular diet after the procedure. Some of the side affects you may experience from the steroids are: Insomnia (inability to sleep), Increased sweating, Headaches, Increased fluid retention (swelling of your extremities), Increased appetite, Face flushing, If you are a diabetic, your blood sugars may go up.  Closely monitor your diet.  Your blood sugar should return to normal in a few days.  Complications: If the discomfort persists, apply moist heat to the area.  Serious complications are very uncommon but may include bleeding, infection or nerve damage. If sever pain, fever, redness or swelling near the injection site, have someone take you to the nearest emergency room to be evaluated for procedure complications or infection. Expectations: Local anesthetics wear off in several hours but duration of relief varies from individual to individual.  If you have any questions, please call the office at [phone].  If this is an emergency, please go to the nearest emergency room.

## 2024-04-04 NOTE — ADDENDUM NOTE
Encounter addended by: Jes Solorzano RN on: 4/4/2024 1:00 PM   Actions taken: Contacts section saved, Flowsheet accepted

## 2024-04-18 ENCOUNTER — HOSPITAL ENCOUNTER (OUTPATIENT)
Dept: RADIOLOGY | Facility: CLINIC | Age: 89
Discharge: HOME | End: 2024-04-18
Payer: MEDICARE

## 2024-04-18 ENCOUNTER — OFFICE VISIT (OUTPATIENT)
Dept: ORTHOPEDIC SURGERY | Facility: CLINIC | Age: 89
End: 2024-04-18
Payer: MEDICARE

## 2024-04-18 DIAGNOSIS — M25.562 CHRONIC PAIN OF LEFT KNEE: ICD-10-CM

## 2024-04-18 DIAGNOSIS — G89.29 CHRONIC PAIN OF LEFT KNEE: ICD-10-CM

## 2024-04-18 PROCEDURE — 99214 OFFICE O/P EST MOD 30 MIN: CPT | Performed by: ORTHOPAEDIC SURGERY

## 2024-04-18 PROCEDURE — 20610 DRAIN/INJ JOINT/BURSA W/O US: CPT | Performed by: ORTHOPAEDIC SURGERY

## 2024-04-18 PROCEDURE — 1160F RVW MEDS BY RX/DR IN RCRD: CPT | Performed by: ORTHOPAEDIC SURGERY

## 2024-04-18 PROCEDURE — 73564 X-RAY EXAM KNEE 4 OR MORE: CPT | Mod: LT

## 2024-04-18 PROCEDURE — 1036F TOBACCO NON-USER: CPT | Performed by: ORTHOPAEDIC SURGERY

## 2024-04-18 PROCEDURE — 1123F ACP DISCUSS/DSCN MKR DOCD: CPT | Performed by: ORTHOPAEDIC SURGERY

## 2024-04-18 PROCEDURE — 1159F MED LIST DOCD IN RCRD: CPT | Performed by: ORTHOPAEDIC SURGERY

## 2024-04-18 RX ORDER — TRIAMCINOLONE ACETONIDE 40 MG/ML
1 INJECTION, SUSPENSION INTRA-ARTICULAR; INTRAMUSCULAR
Status: COMPLETED | OUTPATIENT
Start: 2024-04-18 | End: 2024-04-18

## 2024-04-18 RX ADMIN — TRIAMCINOLONE ACETONIDE 1 ML: 40 INJECTION, SUSPENSION INTRA-ARTICULAR; INTRAMUSCULAR at 09:46

## 2024-04-18 NOTE — PROGRESS NOTES
This is a consultation from Dr. Lynsey Garibay, APRN-CNP, DNP for   Chief Complaint   Patient presents with    Left Knee - Pain       This is a 89 y.o. female who presents for evaluation of left knee pain.  Patient states she has had left knee pain for years, this is a chronic issues been recent exacerbated.  She complains of sharp pain over the medial knee worse with weightbearing and walking.  When she sits it is not very bad.  She has had injections in the past which have been helpful, she does not recall having any surgery.  No numbness or tingling no fevers no chills no shooting pain down the leg.    Physical Exam    There has been no interval change in this patient's past medical, surgical, medications, allergies, family history or social history since the most recent visit to a provider within our department. 14 point review of systems was performed, reviewed, and negative except for pertinent positives documented in the history of present illness.     Constitutional: well developed, well nourished female in no acute distress  Psychiatric: normal mood, appropriate affect  Eyes: sclera anicteric  HENT: normocephalic/atraumatic  CV: regular rate and rhythm   Respiratory: non labored breathing  Integumentary: no rash  Neurological: moves all extremities    Left knee exam: skin intact no lacerations or abrations.  1+ effusion.  Tender medial joint line. negative log roll negative patellar grind. ROM 0-120. stable to varus and valgus stress at 0 and 30 degrees. negative lachman negative posterior drawer negative marco. 5/5 ehl/fhl/gs/ta. silt s/s/sp/dp/t. 2+ dp/pt        Xrays were ordered by me, they were reviewed and independently interpreted by me today, they show severe degenerative disease bone-on-bone arthritis    L Inj/Asp: L knee on 4/18/2024 9:46 AM  Indications: pain and joint swelling  Details: 22 G needle, anterolateral approach  Medications: 1 mL triamcinolone acetonide 40  mg/mL    Discussion:  I discussed the conservative treatment options for knee osteoarthritis including but not limited to physical therapy, oral NSAIDS, activity and lifestyle modification, and corticosteroid injections. Pt has elected to undergo a cortisone injection today. I have explained the risk and benefits of an injection including the possibility of joint infection, bleeding, damage to cartilage, allergic reaction. Patient verbalized understanding and gave verbal consent wishes to proceed with a intra-articular cortisone injection for their knee.    Procedure:  After discussing the risk and benefits of the procedure, we proceeded with an intra-articular left knee injection. We discussed the risks and benefits and potential morbidity related to the treatment, and to the prescription medication administered in the injection    With the patient's informed verbal consent, the left knee was prepped in standard sterile fashion with Chlorhexidine. The skin was then anesthetized with ethyl chloride spray and cleaned again with Chlorhexidine. The knee was then apirated/injected with a prefilled 20-gauge syringe of 40 mg Kenalog + 4 ml Lidocaine using the lateral approach without complications.  The patient tolerated this well and felt immediate initial relief of symptoms. A bandaid was applied and the patient ambulated out of the clinic on ther own accord without difficulty. Patient was instructed to avoid physical activity for 24-48 hours to prevent the knees from swelling and may ice the knees as tolerated. Patient should contact the office if any signs of of infection appear: redness, fever, chills, drainage, swelling or warmth to the knees.  Pt understands that the injections can be repeated no sooner than 3 months.  Procedure, treatment alternatives, risks and benefits explained, specific risks discussed. Consent was given by the patient. Immediately prior to procedure a time out was called to verify the correct  "patient, procedure, equipment, support staff and site/side marked as required. Patient was prepped and draped in the usual sterile fashion.             Impression/Plan: This is a 89 y.o. female with severe left knee arthritis.  I had an in depth discussion with the patient regarding treatment options for arthritis and their relative risks and benefits. We reviewed surgical and nonsurgical option for treatment. Treatments include anti inflammatory medications, physical therapy, weight loss, activity modification, use of assistive devices, injection therapies. We discussed current prescriptions and risks and benefits of continuation of prescription medication as apporpriate. We discussed that arthritis is often progressive over time, an in end stage arthritis surgical interventions can be considered, including arthroplasty. All questions were answered and the patient voiced their understanding.  Will continue nonsurgical treatment I will see her back.    BMI Readings from Last 1 Encounters:   04/02/24 34.41 kg/m²      Lab Results   Component Value Date    CREATININE 0.87 02/26/2024     Tobacco Use: Medium Risk (4/2/2024)    Patient History     Smoking Tobacco Use: Former     Smokeless Tobacco Use: Never     Passive Exposure: Past      Computed MELD 3.0 unavailable. One or more values for this score either were not found within the given timeframe or did not fit some other criterion.  Computed MELD-Na unavailable. One or more values for this score either were not found within the given timeframe or did not fit some other criterion.       Lab Results   Component Value Date    HGBA1C 7.2 (A) 02/12/2024     No results found for: \"STAPHMRSASCR\"  "

## 2024-04-18 NOTE — LETTER
April 18, 2024     HUDSON Lopes DNP  [address] [address]    Patient: Juan Alberto Perez   YOB: 1932   Date of Visit: 4/18/2024       Dear HUDSON Calloway DNP:    Thank you for referring Juan Alberto Perez to me for evaluation. Below are my notes for this consultation.  If you have questions, please do not hesitate to call me. I look forward to following your patient along with you.       Sincerely,     Fransisco Cordova MD      CC: No Recipients  ______________________________________________________________________________________    This is a consultation from HUDSON Calloway DNP for   Chief Complaint   Patient presents with   • Left Knee - Pain       This is a 89 y.o. female who presents for evaluation of left knee pain.  Patient states she has had left knee pain for years, this is a chronic issues been recent exacerbated.  She complains of sharp pain over the medial knee worse with weightbearing and walking.  When she sits it is not very bad.  She has had injections in the past which have been helpful, she does not recall having any surgery.  No numbness or tingling no fevers no chills no shooting pain down the leg.    Physical Exam    There has been no interval change in this patient's past medical, surgical, medications, allergies, family history or social history since the most recent visit to a provider within our department. 14 point review of systems was performed, reviewed, and negative except for pertinent positives documented in the history of present illness.     Constitutional: well developed, well nourished female in no acute distress  Psychiatric: normal mood, appropriate affect  Eyes: sclera anicteric  HENT: normocephalic/atraumatic  CV: regular rate and rhythm   Respiratory: non labored breathing  Integumentary: no rash  Neurological: moves all extremities    Left knee exam: skin intact no lacerations or abrations.  1+  effusion.  Tender medial joint line. negative log roll negative patellar grind. ROM 0-120. stable to varus and valgus stress at 0 and 30 degrees. negative lachman negative posterior drawer negative marco. 5/5 ehl/fhl/gs/ta. silt s/s/sp/dp/t. 2+ dp/pt        Xrays were ordered by me, they were reviewed and independently interpreted by me today, they show severe degenerative disease bone-on-bone arthritis    L Inj/Asp: L knee on 4/18/2024 9:46 AM  Indications: pain and joint swelling  Details: 22 G needle, anterolateral approach  Medications: 1 mL triamcinolone acetonide 40 mg/mL    Discussion:  I discussed the conservative treatment options for knee osteoarthritis including but not limited to physical therapy, oral NSAIDS, activity and lifestyle modification, and corticosteroid injections. Pt has elected to undergo a cortisone injection today. I have explained the risk and benefits of an injection including the possibility of joint infection, bleeding, damage to cartilage, allergic reaction. Patient verbalized understanding and gave verbal consent wishes to proceed with a intra-articular cortisone injection for their knee.    Procedure:  After discussing the risk and benefits of the procedure, we proceeded with an intra-articular left knee injection. We discussed the risks and benefits and potential morbidity related to the treatment, and to the prescription medication administered in the injection    With the patient's informed verbal consent, the left knee was prepped in standard sterile fashion with Chlorhexidine. The skin was then anesthetized with ethyl chloride spray and cleaned again with Chlorhexidine. The knee was then apirated/injected with a prefilled 20-gauge syringe of 40 mg Kenalog + 4 ml Lidocaine using the lateral approach without complications.  The patient tolerated this well and felt immediate initial relief of symptoms. A bandaid was applied and the patient ambulated out of the clinic on ther  own accord without difficulty. Patient was instructed to avoid physical activity for 24-48 hours to prevent the knees from swelling and may ice the knees as tolerated. Patient should contact the office if any signs of of infection appear: redness, fever, chills, drainage, swelling or warmth to the knees.  Pt understands that the injections can be repeated no sooner than 3 months.  Procedure, treatment alternatives, risks and benefits explained, specific risks discussed. Consent was given by the patient. Immediately prior to procedure a time out was called to verify the correct patient, procedure, equipment, support staff and site/side marked as required. Patient was prepped and draped in the usual sterile fashion.             Impression/Plan: This is a 89 y.o. female with severe left knee arthritis.  I had an in depth discussion with the patient regarding treatment options for arthritis and their relative risks and benefits. We reviewed surgical and nonsurgical option for treatment. Treatments include anti inflammatory medications, physical therapy, weight loss, activity modification, use of assistive devices, injection therapies. We discussed current prescriptions and risks and benefits of continuation of prescription medication as apporpriate. We discussed that arthritis is often progressive over time, an in end stage arthritis surgical interventions can be considered, including arthroplasty. All questions were answered and the patient voiced their understanding.  Will continue nonsurgical treatment I will see her back.    BMI Readings from Last 1 Encounters:   04/02/24 34.41 kg/m²      Lab Results   Component Value Date    CREATININE 0.87 02/26/2024     Tobacco Use: Medium Risk (4/2/2024)    Patient History    • Smoking Tobacco Use: Former    • Smokeless Tobacco Use: Never    • Passive Exposure: Past      Computed MELD 3.0 unavailable. One or more values for this score either were not found within the given  "timeframe or did not fit some other criterion.  Computed MELD-Na unavailable. One or more values for this score either were not found within the given timeframe or did not fit some other criterion.       Lab Results   Component Value Date    HGBA1C 7.2 (A) 02/12/2024     No results found for: \"STAPHMRSASCR\"  "

## 2024-04-23 ENCOUNTER — OFFICE VISIT (OUTPATIENT)
Dept: PAIN MEDICINE | Facility: HOSPITAL | Age: 89
End: 2024-04-23
Payer: MEDICARE

## 2024-04-23 VITALS
OXYGEN SATURATION: 96 % | TEMPERATURE: 97.9 F | RESPIRATION RATE: 17 BRPM | WEIGHT: 130 LBS | SYSTOLIC BLOOD PRESSURE: 140 MMHG | BODY MASS INDEX: 28.05 KG/M2 | HEIGHT: 57 IN | DIASTOLIC BLOOD PRESSURE: 70 MMHG | HEART RATE: 83 BPM

## 2024-04-23 DIAGNOSIS — G89.29 CHRONIC BILATERAL LOW BACK PAIN WITHOUT SCIATICA: ICD-10-CM

## 2024-04-23 DIAGNOSIS — M54.50 CHRONIC BILATERAL LOW BACK PAIN WITHOUT SCIATICA: ICD-10-CM

## 2024-04-23 DIAGNOSIS — M47.817 FACET ARTHROPATHY, LUMBOSACRAL: Primary | ICD-10-CM

## 2024-04-23 PROCEDURE — 99213 OFFICE O/P EST LOW 20 MIN: CPT | Performed by: ANESTHESIOLOGY

## 2024-04-23 PROCEDURE — 1036F TOBACCO NON-USER: CPT | Performed by: ANESTHESIOLOGY

## 2024-04-23 PROCEDURE — 3078F DIAST BP <80 MM HG: CPT | Performed by: ANESTHESIOLOGY

## 2024-04-23 PROCEDURE — 3077F SYST BP >= 140 MM HG: CPT | Performed by: ANESTHESIOLOGY

## 2024-04-23 PROCEDURE — 1123F ACP DISCUSS/DSCN MKR DOCD: CPT | Performed by: ANESTHESIOLOGY

## 2024-04-23 PROCEDURE — 1159F MED LIST DOCD IN RCRD: CPT | Performed by: ANESTHESIOLOGY

## 2024-04-23 PROCEDURE — 1160F RVW MEDS BY RX/DR IN RCRD: CPT | Performed by: ANESTHESIOLOGY

## 2024-04-23 PROCEDURE — 1125F AMNT PAIN NOTED PAIN PRSNT: CPT | Performed by: ANESTHESIOLOGY

## 2024-04-23 RX ORDER — ONDANSETRON 4 MG/1
4 TABLET, FILM COATED ORAL EVERY 8 HOURS PRN
COMMUNITY

## 2024-04-23 RX ORDER — LOPERAMIDE HYDROCHLORIDE 2 MG/1
2 CAPSULE ORAL 4 TIMES DAILY PRN
COMMUNITY

## 2024-04-23 RX ORDER — ADHESIVE BANDAGE
30 BANDAGE TOPICAL DAILY PRN
COMMUNITY

## 2024-04-23 ASSESSMENT — PAIN SCALES - GENERAL: PAINLEVEL: 4

## 2024-04-23 NOTE — PROGRESS NOTES
Patient is a 89 year old female here for pain following a bilateral L2-L3, L3-L4 diagnostic medial branch nerve block. Patient states she had 100% relief immediately following procedure and is still 50-70% improved; and states her pain was a 3 out of 10 in the days following procedure. Patient currently has pain level of 4 to her lower back that she describes as tenderness and tightness; patient denies and radiating pain.    I have personally reviewed the [place] report for Zhanna Perez    Date of the last Controlled Substance Agreement: 12/04/2023       Last urine drug screening date/ordered today03/05/2024      Results of last screen: 03/05/2024      Last opioid risk screening date/ordered today: 12/04/2023      Pain Scale Screening:   Pain Assessment and Documentation Tool (PADT)   Date of Assessment: 04/23/2024  Analgesia:   Patient reports her pain level on average during the past week is 3 on a 0 - 10 scale.   Patient reports that her pain level at its worst during the past week was 6 on a 0 -10 scale.     Activities of Daily Living:   Physical functioning: better  Family relationships: unchanged  Social relationships: unchanged  Mood: better  Sleep patterns: unchanged  Overall functioning: better  Adverse Events: No, Zhanna Perez is not experiencing side effects from current pain reliever.  Patients overall severity of side effect:N/A  Specific Analgesic Plan: Continue present regimen.

## 2024-04-23 NOTE — H&P (VIEW-ONLY)
History Of Present Illness  Juan Alberto Perez is a 89 y.o. female presenting with LBP without radiculopathy patient had diagnostic medial branch nerve block on 4/2/2024 which gave her over 90% sustained relief.  Patient is accompanied today by her daughter who states she is able to function at a higher level to include walking more than 25 feet with.  She denies bladder or bowel dysfunction and there is no radicular symptoms noted..     Past Medical History  Past Medical History:   Diagnosis Date    Age-related cognitive decline 03/31/2015    Age-related cognitive decline    Body mass index (BMI) 32.0-32.9, adult 10/28/2020    Body mass index (BMI) of 32.0 to 32.9 in adult    Bursitis of right shoulder 08/21/2018    Subdeltoid bursitis of right shoulder joint    Encounter for general adult medical examination without abnormal findings 04/01/2016    Medicare annual wellness visit, subsequent    Encounter for other preprocedural examination 08/30/2016    Pre-operative clearance    Erythematous condition, unspecified 05/09/2018    Breast erythema    Mastitis without abscess 05/09/2018    Cellulitis of breast    Mastodynia 05/09/2018    Breast tenderness    Mastodynia 05/09/2018    Pain of left breast    Obesity, unspecified 08/02/2021    Class 1 obesity with serious comorbidity and body mass index (BMI) of 33.0 to 33.9 in adult, unspecified obesity type    Obesity, unspecified 10/28/2020    Obesity (BMI 30-39.9)    Obesity, unspecified 09/15/2021    Class 1 obesity with serious comorbidity and body mass index (BMI) of 34.0 to 34.9 in adult, unspecified obesity type    Obesity, unspecified 05/03/2021    Class 1 obesity with serious comorbidity and body mass index (BMI) of 32.0 to 32.9 in adult, unspecified obesity type    Occipital neuralgia 05/12/2016    Bilateral occipital neuralgia    Other conditions influencing health status 01/28/2020    Uncontrolled type 2 diabetes mellitus with complication, without long-term  current use of insulin    Other conditions influencing health status 04/07/2017    Myalgia and myositis    Pain in right shoulder 07/05/2018    Pain in joint of right shoulder    Pain in unspecified hip 12/29/2014    Hip pain    Personal history of other diseases of the female genital tract 05/09/2018    History of breast swelling    Personal history of other diseases of the musculoskeletal system and connective tissue     History of tendinitis    Personal history of other diseases of the nervous system and sense organs 08/23/2017    History of sleep apnea    Personal history of other diseases of the nervous system and sense organs 03/27/2014    History of sleep apnea    Personal history of other diseases of the respiratory system 06/27/2016    History of acute bronchitis    Personal history of other diseases of the respiratory system 06/19/2020    History of acute sinusitis    Personal history of other infectious and parasitic diseases 02/25/2016    History of viral encephalitis    Trigger finger, right middle finger 03/19/2020    Trigger middle finger of right hand    Trochanteric bursitis, left hip 06/03/2019    Trochanteric bursitis of left hip    Type 2 diabetes mellitus with other skin complications (Multi) 05/21/2018    Uncontrolled type 2 diabetes mellitus with other skin complication, unspecified long term insulin use status    Unilateral primary osteoarthritis, right knee 08/16/2017    Arthritis of knee, right    Urinary tract infection, site not specified 10/31/2020    Acute UTI       Surgical History  Past Surgical History:   Procedure Laterality Date    BREAST SURGERY  03/02/2015    Breast Surgery    EYE SURGERY  03/02/2015    Eye Surgery    JOINT REPLACEMENT Right     knee replacement    KNEE ARTHROSCOPY W/ DEBRIDEMENT  04/30/2013    Knee Arthroscopy (Therapeutic)    MR NECK ANGIO WO IV CONTRAST  02/26/2016    MR NECK ANGIO WO IV CONTRAST 2/26/2016 [facility]    OTHER SURGICAL HISTORY  04/30/2013  "   Supracervical Hysterectomy Laparoscopic Uterus 250g Or Less    TONSILLECTOMY  04/30/2013    Tonsillectomy        Social History  She reports that she quit smoking about 73 years ago. Her smoking use included cigarettes. She has been exposed to tobacco smoke. She has never used smokeless tobacco. She reports that she does not drink alcohol and does not use drugs.    Family History  Family History   Problem Relation Name Age of Onset    Brain cancer Father      Coronary artery disease Brother          Allergies  Codeine, Darvocet a500 [propoxyphene n-acetaminophen], Dilaudid [hydromorphone], Gabapentin, and Propoxyphene-acetaminophen    Review of Systems  Unremarkable except for chief complaint of low back pain  Physical Exam  Gen. appearance:  Patient's alert and oriented ×3 ;cooperative mild to moderate distress  Heart: Regular rate and rhythm  Lungs: Clear to auscultation  Abdomen: Soft nontender  Neuro: Cranial nerves II -XII intact; DTR: +2 over 4 biceps triceps brachial radialis patellar and Achilles  Spinal exam: Positive paraspinal tenderness noted bilaterally L4 5 L5-S1 with flexion extension and rotation/no bony abnormalities  Musculoskeletal:  Upper and lower extremity strength was 4/5 bilaterally  :  deferred  Skin: Warm and dry      Last Recorded Vitals  Blood pressure 140/70, pulse 83, temperature 36.6 °C (97.9 °F), temperature source Temporal, resp. rate 17, height 1.448 m (4' 9\"), weight 59 kg (130 lb), SpO2 96%.    Relevant Results         Assessment/Plan   Diagnoses and all orders for this visit:  Facet arthropathy, lumbosacral  -      Medial Nerve Branch Block; Future  Chronic bilateral low back pain without sciatica    Risk and benefits of repeat diagnostic medial branch nerve block at L2-3 L3-4 levels bilaterally under direct fluoroscopy was discussed and will be scheduled for 5/14/2024.  All questions were answered prior to the patient's discharge.  She was not given any prescription " medication at this time and told to be n.p.o. for 6 hours prior to scheduled procedure in the event she requests conscious sedation.       I spent 20 minutes in the professional and overall care of this patient.      oTdd Way, DO

## 2024-04-23 NOTE — H&P
History Of Present Illness  Juan Alberto Perez is a 89 y.o. female presenting with LBP without radiculopathy patient had diagnostic medial branch nerve block on 4/2/2024 which gave her over 90% sustained relief.  Patient is accompanied today by her daughter who states she is able to function at a higher level to include walking more than 25 feet with.  She denies bladder or bowel dysfunction and there is no radicular symptoms noted..     Past Medical History  Past Medical History:   Diagnosis Date    Age-related cognitive decline 03/31/2015    Age-related cognitive decline    Body mass index (BMI) 32.0-32.9, adult 10/28/2020    Body mass index (BMI) of 32.0 to 32.9 in adult    Bursitis of right shoulder 08/21/2018    Subdeltoid bursitis of right shoulder joint    Encounter for general adult medical examination without abnormal findings 04/01/2016    Medicare annual wellness visit, subsequent    Encounter for other preprocedural examination 08/30/2016    Pre-operative clearance    Erythematous condition, unspecified 05/09/2018    Breast erythema    Mastitis without abscess 05/09/2018    Cellulitis of breast    Mastodynia 05/09/2018    Breast tenderness    Mastodynia 05/09/2018    Pain of left breast    Obesity, unspecified 08/02/2021    Class 1 obesity with serious comorbidity and body mass index (BMI) of 33.0 to 33.9 in adult, unspecified obesity type    Obesity, unspecified 10/28/2020    Obesity (BMI 30-39.9)    Obesity, unspecified 09/15/2021    Class 1 obesity with serious comorbidity and body mass index (BMI) of 34.0 to 34.9 in adult, unspecified obesity type    Obesity, unspecified 05/03/2021    Class 1 obesity with serious comorbidity and body mass index (BMI) of 32.0 to 32.9 in adult, unspecified obesity type    Occipital neuralgia 05/12/2016    Bilateral occipital neuralgia    Other conditions influencing health status 01/28/2020    Uncontrolled type 2 diabetes mellitus with complication, without long-term  current use of insulin    Other conditions influencing health status 04/07/2017    Myalgia and myositis    Pain in right shoulder 07/05/2018    Pain in joint of right shoulder    Pain in unspecified hip 12/29/2014    Hip pain    Personal history of other diseases of the female genital tract 05/09/2018    History of breast swelling    Personal history of other diseases of the musculoskeletal system and connective tissue     History of tendinitis    Personal history of other diseases of the nervous system and sense organs 08/23/2017    History of sleep apnea    Personal history of other diseases of the nervous system and sense organs 03/27/2014    History of sleep apnea    Personal history of other diseases of the respiratory system 06/27/2016    History of acute bronchitis    Personal history of other diseases of the respiratory system 06/19/2020    History of acute sinusitis    Personal history of other infectious and parasitic diseases 02/25/2016    History of viral encephalitis    Trigger finger, right middle finger 03/19/2020    Trigger middle finger of right hand    Trochanteric bursitis, left hip 06/03/2019    Trochanteric bursitis of left hip    Type 2 diabetes mellitus with other skin complications (Multi) 05/21/2018    Uncontrolled type 2 diabetes mellitus with other skin complication, unspecified long term insulin use status    Unilateral primary osteoarthritis, right knee 08/16/2017    Arthritis of knee, right    Urinary tract infection, site not specified 10/31/2020    Acute UTI       Surgical History  Past Surgical History:   Procedure Laterality Date    BREAST SURGERY  03/02/2015    Breast Surgery    EYE SURGERY  03/02/2015    Eye Surgery    JOINT REPLACEMENT Right     knee replacement    KNEE ARTHROSCOPY W/ DEBRIDEMENT  04/30/2013    Knee Arthroscopy (Therapeutic)    MR NECK ANGIO WO IV CONTRAST  02/26/2016    MR NECK ANGIO WO IV CONTRAST 2/26/2016 [facility]    OTHER SURGICAL HISTORY  04/30/2013  "   Supracervical Hysterectomy Laparoscopic Uterus 250g Or Less    TONSILLECTOMY  04/30/2013    Tonsillectomy        Social History  She reports that she quit smoking about 73 years ago. Her smoking use included cigarettes. She has been exposed to tobacco smoke. She has never used smokeless tobacco. She reports that she does not drink alcohol and does not use drugs.    Family History  Family History   Problem Relation Name Age of Onset    Brain cancer Father      Coronary artery disease Brother          Allergies  Codeine, Darvocet a500 [propoxyphene n-acetaminophen], Dilaudid [hydromorphone], Gabapentin, and Propoxyphene-acetaminophen    Review of Systems  Unremarkable except for chief complaint of low back pain  Physical Exam  Gen. appearance:  Patient's alert and oriented ×3 ;cooperative mild to moderate distress  Heart: Regular rate and rhythm  Lungs: Clear to auscultation  Abdomen: Soft nontender  Neuro: Cranial nerves II -XII intact; DTR: +2 over 4 biceps triceps brachial radialis patellar and Achilles  Spinal exam: Positive paraspinal tenderness noted bilaterally L4 5 L5-S1 with flexion extension and rotation/no bony abnormalities  Musculoskeletal:  Upper and lower extremity strength was 4/5 bilaterally  :  deferred  Skin: Warm and dry      Last Recorded Vitals  Blood pressure 140/70, pulse 83, temperature 36.6 °C (97.9 °F), temperature source Temporal, resp. rate 17, height 1.448 m (4' 9\"), weight 59 kg (130 lb), SpO2 96%.    Relevant Results         Assessment/Plan   Diagnoses and all orders for this visit:  Facet arthropathy, lumbosacral  -      Medial Nerve Branch Block; Future  Chronic bilateral low back pain without sciatica    Risk and benefits of repeat diagnostic medial branch nerve block at L2-3 L3-4 levels bilaterally under direct fluoroscopy was discussed and will be scheduled for 5/14/2024.  All questions were answered prior to the patient's discharge.  She was not given any prescription " medication at this time and told to be n.p.o. for 6 hours prior to scheduled procedure in the event she requests conscious sedation.       I spent 20 minutes in the professional and overall care of this patient.      Todd Way, DO

## 2024-05-01 ENCOUNTER — DOCUMENTATION (OUTPATIENT)
Dept: PAIN MEDICINE | Facility: HOSPITAL | Age: 89
End: 2024-05-01
Payer: MEDICARE

## 2024-05-03 ENCOUNTER — CLINICAL SUPPORT (OUTPATIENT)
Dept: ORTHOPEDIC SURGERY | Facility: CLINIC | Age: 89
End: 2024-05-03
Payer: MEDICARE

## 2024-05-03 DIAGNOSIS — M25.562 CHRONIC PAIN OF LEFT KNEE: Primary | ICD-10-CM

## 2024-05-03 DIAGNOSIS — G89.29 CHRONIC PAIN OF LEFT KNEE: Primary | ICD-10-CM

## 2024-05-03 PROCEDURE — 99213 OFFICE O/P EST LOW 20 MIN: CPT

## 2024-05-03 RX ORDER — CIPROFLOXACIN 500 MG/1
500 TABLET ORAL ONCE
Status: CANCELLED | OUTPATIENT
Start: 2024-05-14

## 2024-05-03 NOTE — PROGRESS NOTES
HPI  Zhanna Perez is a 89 y.o. female in office today for follow up of side: left knee pain.  she had a steroid injection with Dr Cordova about 2 weeks ago and continues to have pain.  She states it is painful to stand on the knee, worse now than before the injection.  She has been taking Tylenol without much relief.  Denies any injury or trauma to the knee.      Physical Exam  Constitutional: well developed, well nourished female in no acute distress  Psychiatric: normal mood, appropriate affect  Eyes: sclera anicteric  HENT: normocephalic/atraumatic  CV: regular rate and rhythm   Respiratory: non labored breathing  Integumentary: no rash  Neurological: moves all extremities    Left Knee Exam     Tenderness   The patient is experiencing tenderness in the patella, medial joint line and lateral joint line.    Range of Motion   Extension:  0   Flexion:  110     Tests   Varus: negative Valgus: negative  Patellar apprehension: negative    Other   Erythema: absent  Scars: present  Sensation: normal  Swelling: mild            Imaging/Lab:  X-rays were taken 4/18/24 which were reviewed by myself and read by radiology and show severe osteoarthritis in the left knee, predominantly the medial compartment.  Chondrocalcinosis with CPPD arthropathy.    Assessment  Assessment: Chronic left knee pain    Plan  Plan:  History, physical exam, and imaging were reviewed with patient. Discussed trying an antiinflammatory either short period of oral or topical Voltaren.  Also can try a brace for when she is ambulating to give additional support to the knee.  Knee brace provided to patient.  She should continue with her therapy and exercises, can just skip the exercises that cause increased pain.  Follow Up: Patient to follow up as needed if pain persists or gets worse.      All questions were answered for the patient prior to end of exam and patient addressed their understanding.    Vianey Aiken PA-C  05/03/24

## 2024-05-13 ENCOUNTER — TELEPHONE (OUTPATIENT)
Dept: OPERATING ROOM | Facility: HOSPITAL | Age: 89
End: 2024-05-13
Payer: MEDICARE

## 2024-05-13 ENCOUNTER — TELEPHONE (OUTPATIENT)
Dept: ORTHOPEDIC SURGERY | Facility: CLINIC | Age: 89
End: 2024-05-13
Payer: MEDICARE

## 2024-05-13 DIAGNOSIS — M25.562 LEFT KNEE PAIN, UNSPECIFIED CHRONICITY: ICD-10-CM

## 2024-05-13 NOTE — TELEPHONE ENCOUNTER
Patients daughter called stating the if she continued to have left knee pain she would put in an order for a CT scan, spoke with Vianey Aiken PA-C  she does not recall telling the patient this but a CT was ordered per Vianey, called patient to let her know order is in

## 2024-05-14 ENCOUNTER — APPOINTMENT (OUTPATIENT)
Dept: PAIN MEDICINE | Facility: HOSPITAL | Age: 89
End: 2024-05-14
Payer: MEDICARE

## 2024-05-14 ENCOUNTER — HOSPITAL ENCOUNTER (OUTPATIENT)
Dept: RADIOLOGY | Facility: HOSPITAL | Age: 89
Discharge: HOME | End: 2024-05-14
Payer: MEDICARE

## 2024-05-14 ENCOUNTER — HOSPITAL ENCOUNTER (OUTPATIENT)
Dept: PAIN MEDICINE | Facility: HOSPITAL | Age: 89
Discharge: HOME | End: 2024-05-14
Payer: MEDICARE

## 2024-05-14 VITALS
WEIGHT: 145 LBS | DIASTOLIC BLOOD PRESSURE: 76 MMHG | HEART RATE: 73 BPM | SYSTOLIC BLOOD PRESSURE: 136 MMHG | OXYGEN SATURATION: 97 % | HEIGHT: 56 IN | BODY MASS INDEX: 32.62 KG/M2 | RESPIRATION RATE: 17 BRPM | TEMPERATURE: 98.2 F

## 2024-05-14 DIAGNOSIS — M47.817 FACET ARTHROPATHY, LUMBOSACRAL: ICD-10-CM

## 2024-05-14 DIAGNOSIS — M54.50 CHRONIC BILATERAL LOW BACK PAIN WITHOUT SCIATICA: ICD-10-CM

## 2024-05-14 DIAGNOSIS — G89.29 CHRONIC BILATERAL LOW BACK PAIN WITHOUT SCIATICA: ICD-10-CM

## 2024-05-14 PROCEDURE — 64493 INJ PARAVERT F JNT L/S 1 LEV: CPT | Performed by: ANESTHESIOLOGY

## 2024-05-14 PROCEDURE — 7100000009 HC PHASE TWO TIME - INITIAL BASE CHARGE: Performed by: ANESTHESIOLOGY

## 2024-05-14 PROCEDURE — 64494 INJ PARAVERT F JNT L/S 2 LEV: CPT | Performed by: ANESTHESIOLOGY

## 2024-05-14 PROCEDURE — 7100000010 HC PHASE TWO TIME - EACH INCREMENTAL 1 MINUTE: Performed by: ANESTHESIOLOGY

## 2024-05-14 PROCEDURE — 64494 INJ PARAVERT F JNT L/S 2 LEV: CPT | Mod: 50 | Performed by: ANESTHESIOLOGY

## 2024-05-14 RX ORDER — CIPROFLOXACIN 500 MG/1
500 TABLET ORAL ONCE
Status: DISCONTINUED | OUTPATIENT
Start: 2024-05-14 | End: 2024-05-15 | Stop reason: HOSPADM

## 2024-05-14 ASSESSMENT — COLUMBIA-SUICIDE SEVERITY RATING SCALE - C-SSRS
2. HAVE YOU ACTUALLY HAD ANY THOUGHTS OF KILLING YOURSELF?: NO
6. HAVE YOU EVER DONE ANYTHING, STARTED TO DO ANYTHING, OR PREPARED TO DO ANYTHING TO END YOUR LIFE?: NO
1. IN THE PAST MONTH, HAVE YOU WISHED YOU WERE DEAD OR WISHED YOU COULD GO TO SLEEP AND NOT WAKE UP?: NO

## 2024-05-14 ASSESSMENT — PAIN - FUNCTIONAL ASSESSMENT: PAIN_FUNCTIONAL_ASSESSMENT: 0-10

## 2024-05-14 ASSESSMENT — PAIN DESCRIPTION - DESCRIPTORS: DESCRIPTORS: ACHING

## 2024-05-14 ASSESSMENT — PAIN SCALES - GENERAL
PAINLEVEL_OUTOF10: 0 - NO PAIN
PAINLEVEL_OUTOF10: 6

## 2024-05-14 NOTE — OP NOTE
"Date: 2024  OR Location: GEN NON-OR PROCEDURES    Name: Zhanna Perez \"Juan Alberto\", : 1932, Age: 89 y.o., MRN: [id number], Sex: female    Diagnosis   1. Facet arthropathy, lumbosacral   Medial Nerve Branch Block     Medial Nerve Branch Block      2. Chronic bilateral low back pain without sciatica   Medial Nerve Branch Block     Medial Nerve Branch Block        Preprocedure;  Patient's airway was reevaluated preoperatively prior to receiving IV conscious sedation.  No anatomical adverse airway conditions were noted.  A Mallampati score of 2 was assigned to the patient.  ASA : II    Operation Performed: Diagnostic Medial Branch Block    Facet joint injection with fluoroscopic guidance.  Preoperative/postop diagnosis:  Lumbar spondylosis without myelopathy L2-3, L3-4     Complications: None  Estimated blood loss: None  Anesthesia: Local    Procedures     Procedure:  Patient was identified with name , , and medical record number.   Appropriate timeout was performed preoperatively.  The patient was taken to the operating room and placed in the prone position where sterile prep was done and drapes were applied. Supplemental oxygen was given to the patient at 2 L/m  per nasal cannula. Patients blood pressure was monitored throughout the case and remained stable. Pulse oximetry remained at 100% throughout the procedure. A  25-gauge 3 1/2 inch spinal needle was then inserted into the inferior aspect of the facet joints at  L2-3, L3-4  . Aspiration was negative for the cerebral spinal fluid or blood. There were no paresthesias, dysesthesias, or severe pain noted. A total of 1mL of preservative-free 0.25% Marcaine was injected into each of the L2-3,L3-L4  facet joints. The needles were then removed and a sterile bandage with Neosporin Ointment was applied over the puncture sites. The patient was taken to the recovery room in awake and in stable condition.  "

## 2024-05-14 NOTE — DISCHARGE INSTRUCTIONS
No heavy lifting or strenuous activity today.    Keep bandage on for 24 hours.  Keep injection site clean and dry, it may be sore for up to 48 hours.  For relief of pain and swelling, you may apply ice to the injection site area.  If pain persist you may apply moist heat.  Rare side effects include infection, bleeding, nerve damage. If you have fever, redness or swelling near site, severe pain, please go to the nearest emergency room. For other questions please call office at [phone]. Local anesthetics wear off in several hours and duration of relief vary from person to person.

## 2024-05-16 ASSESSMENT — PAIN SCALES - GENERAL: PAINLEVEL_OUTOF10: 0 - NO PAIN

## 2024-05-21 ENCOUNTER — APPOINTMENT (OUTPATIENT)
Dept: PAIN MEDICINE | Facility: HOSPITAL | Age: 89
End: 2024-05-21
Payer: MEDICARE

## 2024-05-23 ENCOUNTER — HOSPITAL ENCOUNTER (OUTPATIENT)
Dept: RADIOLOGY | Facility: HOSPITAL | Age: 89
Discharge: HOME | End: 2024-05-23
Payer: MEDICARE

## 2024-05-23 VITALS
DIASTOLIC BLOOD PRESSURE: 69 MMHG | HEART RATE: 76 BPM | RESPIRATION RATE: 18 BRPM | OXYGEN SATURATION: 100 % | SYSTOLIC BLOOD PRESSURE: 149 MMHG

## 2024-05-23 DIAGNOSIS — M25.562 LEFT KNEE PAIN, UNSPECIFIED CHRONICITY: ICD-10-CM

## 2024-05-23 PROCEDURE — 77002 NEEDLE LOCALIZATION BY XRAY: CPT | Mod: LEFT SIDE | Performed by: STUDENT IN AN ORGANIZED HEALTH CARE EDUCATION/TRAINING PROGRAM

## 2024-05-23 PROCEDURE — 27369 NJX CNTRST KNE ARTHG/CT/MRI: CPT | Mod: 73,LT

## 2024-05-23 PROCEDURE — 2550000001 HC RX 255 CONTRASTS

## 2024-05-23 PROCEDURE — 20610 DRAIN/INJ JOINT/BURSA W/O US: CPT | Mod: LEFT SIDE | Performed by: STUDENT IN AN ORGANIZED HEALTH CARE EDUCATION/TRAINING PROGRAM

## 2024-05-23 RX ORDER — LIDOCAINE HYDROCHLORIDE 20 MG/ML
10 INJECTION, SOLUTION INFILTRATION; PERINEURAL ONCE
Status: CANCELLED | OUTPATIENT
Start: 2024-05-23 | End: 2024-05-23

## 2024-05-23 RX ADMIN — IOHEXOL 40 ML: 240 INJECTION, SOLUTION INTRATHECAL; INTRAVASCULAR; INTRAVENOUS; ORAL at 15:30

## 2024-05-23 ASSESSMENT — PAIN SCALES - GENERAL: PAINLEVEL_OUTOF10: 0 - NO PAIN

## 2024-05-23 NOTE — PRE-PROCEDURE NOTE
Interventional Radiology Preprocedure Note    Indication for procedure: The encounter diagnosis was Left knee pain, unspecified chronicity.    Relevant review of systems: NA    Relevant Labs:   Lab Results   Component Value Date    CREATININE 0.87 02/26/2024    EGFR 63 02/26/2024    INR 1.1 08/12/2021    PROTIME 12.6 08/12/2021       Planned Sedation/Anesthesia: Local    Airway assessment:  N/A    Directed physical examination:    N/A    Mallampati:  N/A    ASA Score: ASA 2 - Patient with mild systemic disease with no functional limitations    Benefits, risks and alternatives of procedure and planned sedation have been discussed with the patient and/or their representative. All questions answered and they agree to proceed.

## 2024-05-24 DIAGNOSIS — G89.29 CHRONIC PAIN OF LEFT KNEE: Primary | ICD-10-CM

## 2024-05-24 DIAGNOSIS — M25.562 CHRONIC PAIN OF LEFT KNEE: Primary | ICD-10-CM

## 2024-05-28 ENCOUNTER — DOCUMENTATION (OUTPATIENT)
Dept: PAIN MEDICINE | Facility: HOSPITAL | Age: 89
End: 2024-05-28
Payer: MEDICARE

## 2024-05-29 ENCOUNTER — HOSPITAL ENCOUNTER (OUTPATIENT)
Dept: RADIOLOGY | Facility: HOSPITAL | Age: 89
Discharge: HOME | End: 2024-05-29
Payer: MEDICARE

## 2024-05-29 DIAGNOSIS — M25.562 CHRONIC PAIN OF LEFT KNEE: ICD-10-CM

## 2024-05-29 DIAGNOSIS — G89.29 CHRONIC PAIN OF LEFT KNEE: ICD-10-CM

## 2024-05-29 PROCEDURE — 73721 MRI JNT OF LWR EXTRE W/O DYE: CPT | Mod: LEFT SIDE | Performed by: RADIOLOGY

## 2024-05-29 PROCEDURE — 73721 MRI JNT OF LWR EXTRE W/O DYE: CPT | Mod: LT

## 2024-05-30 ENCOUNTER — TELEPHONE (OUTPATIENT)
Dept: ORTHOPEDIC SURGERY | Facility: CLINIC | Age: 89
End: 2024-05-30
Payer: MEDICARE

## 2024-05-30 NOTE — TELEPHONE ENCOUNTER
Spoke with daughter regarding the MRI that she does have a stress fracture on the posterior end of her femur, she advised that she keep her leg straight and to eat meals high in vitamin D. She is to stay off her knee as much as possible and to follow up with nate todd in 4 weeks. The appt was made and we will see her back on 6/10/24

## 2024-06-05 ENCOUNTER — OFFICE VISIT (OUTPATIENT)
Dept: PAIN MEDICINE | Facility: HOSPITAL | Age: 89
End: 2024-06-05
Payer: MEDICARE

## 2024-06-05 VITALS
DIASTOLIC BLOOD PRESSURE: 84 MMHG | HEIGHT: 57 IN | TEMPERATURE: 97.6 F | SYSTOLIC BLOOD PRESSURE: 156 MMHG | WEIGHT: 155 LBS | HEART RATE: 72 BPM | BODY MASS INDEX: 33.44 KG/M2 | RESPIRATION RATE: 16 BRPM

## 2024-06-05 DIAGNOSIS — G89.29 CHRONIC BILATERAL LOW BACK PAIN WITHOUT SCIATICA: ICD-10-CM

## 2024-06-05 DIAGNOSIS — M46.1 SACROILIITIS (CMS-HCC): ICD-10-CM

## 2024-06-05 DIAGNOSIS — M47.817 FACET ARTHROPATHY, LUMBOSACRAL: Primary | ICD-10-CM

## 2024-06-05 DIAGNOSIS — Z79.899 MEDICATION MANAGEMENT: ICD-10-CM

## 2024-06-05 DIAGNOSIS — M54.50 CHRONIC BILATERAL LOW BACK PAIN WITHOUT SCIATICA: ICD-10-CM

## 2024-06-05 PROCEDURE — 82570 ASSAY OF URINE CREATININE: CPT | Performed by: NURSE PRACTITIONER

## 2024-06-05 PROCEDURE — 80307 DRUG TEST PRSMV CHEM ANLYZR: CPT | Mod: MUE | Performed by: NURSE PRACTITIONER

## 2024-06-05 PROCEDURE — 99214 OFFICE O/P EST MOD 30 MIN: CPT | Performed by: NURSE PRACTITIONER

## 2024-06-05 PROCEDURE — 80346 BENZODIAZEPINES1-12: CPT | Performed by: NURSE PRACTITIONER

## 2024-06-05 ASSESSMENT — ENCOUNTER SYMPTOMS
NECK PAIN: 0
JOINT SWELLING: 0
BACK PAIN: 1
MYALGIAS: 1
ARTHRALGIAS: 1
PSYCHIATRIC NEGATIVE: 1
GASTROINTESTINAL NEGATIVE: 1
NEUROLOGICAL NEGATIVE: 1
NECK STIFFNESS: 0
RESPIRATORY NEGATIVE: 1
CONSTITUTIONAL NEGATIVE: 1
ALLERGIC/IMMUNOLOGIC NEGATIVE: 1
EYES NEGATIVE: 1
ENDOCRINE NEGATIVE: 1
CARDIOVASCULAR NEGATIVE: 1

## 2024-06-05 ASSESSMENT — PAIN SCALES - GENERAL: PAINLEVEL: 6

## 2024-06-05 NOTE — PROGRESS NOTES
"Subjective   Patient ID: Zhanna Perez \"Juan Alberto\" is a 89 y.o. female who presents for Follow-up (Post procedure follow up).    Zhanna is a pleasant 89-year-old  female who is here for postprocedure follow-up.  Patient presents in her wheelchair.  She ambulates in short distances.  She arrives with her son.  Patient is wearing soft brace to her left knee.    Patient had bilateral L2-L3, L3-L4 diagnostic medial branch block #2 on 5/14/2024.  The injection has improved her back pain.  It provided 80% relief.  It lasted for several weeks.  The injection has improved her pain, ability to participate in her daily activities, ability to participate in physical therapy and enjoy social activities.  Preprocedure pain level was 6 out of 10, postprocedure was 1 out of 10.    Patient continues to have lower back pain.  She rates her pain as 6 out of 10.  Pain comes and goes but can be constant depending on her activities.  Prolonged standing aggravates her back pain.  She describes her pain as spastic.  She denies pain, numbness or tingling sensation to her legs.  She denies leg weakness or change in balance.  She denies recent falls or injuries.  However patient is having left knee pain.  Patient reports that she went to her PCP where they did an x-ray which did not show anything.  An MRI was ordered which shows nondisplaced fracture.  She has a follow-up appointment with Ortho for the locking brace.  Patient reports that she was not aware of this fracture as there was no injury that she can recall.    Patient continues to take Tylenol for pain relief.  She is doing physical therapy at the [facility] with minimal effect to her back pain.    I reviewed previous notes, procedures and imaging.  I discussed the plan of care including pharmacologic and joint interventional procedure.  I discussed lumbar RFA and she is in agreement to proceed to this procedure.  This is done under fluoroscopy.  Questions were " answered during this encounter.    -------------  PROCEDURES:    5/14/2024: Bilateral L2-L3, L3-L4 diagnostic MBB #2  4/2/2024: Bilateral L2-L3, L3-L4 diagnostic MBB #1  1/30/2024: Bilateral SI joint injection  12/11/2023: Right L4-L5, L5-S1 RFA  8/25/2023: Right SI joint injection  7/18/2023: Left L5 and S1 TFESI  12/7/2022: Left L3-L4, right L4-L5 TFESI with Dr. Banks  9/16/2022: Right L4-L5, L5-S1 RFA with Dr. Banks  ---------------          OARRS:  PANDA Javier-CNP, DNP on 6/5/2024  1:09 PM  I have personally reviewed the OARRS report for Zhanna Perez. I have considered the risks of abuse, dependence, addiction and diversion    Past Medical History  She has a past medical history of Age-related cognitive decline (03/31/2015), Body mass index (BMI) 32.0-32.9, adult (10/28/2020), Bursitis of right shoulder (08/21/2018), Encounter for general adult medical examination without abnormal findings (04/01/2016), Encounter for other preprocedural examination (08/30/2016), Erythematous condition, unspecified (05/09/2018), Mastitis without abscess (05/09/2018), Mastodynia (05/09/2018), Mastodynia (05/09/2018), Obesity, unspecified (08/02/2021), Obesity, unspecified (10/28/2020), Obesity, unspecified (09/15/2021), Obesity, unspecified (05/03/2021), Occipital neuralgia (05/12/2016), Other conditions influencing health status (01/28/2020), Other conditions influencing health status (04/07/2017), Pain in right shoulder (07/05/2018), Pain in unspecified hip (12/29/2014), Personal history of other diseases of the female genital tract (05/09/2018), Personal history of other diseases of the musculoskeletal system and connective tissue, Personal history of other diseases of the nervous system and sense organs (08/23/2017), Personal history of other diseases of the nervous system and sense organs (03/27/2014), Personal history of other diseases of the respiratory system (06/27/2016), Personal history of other diseases of  the respiratory system (06/19/2020), Personal history of other infectious and parasitic diseases (02/25/2016), Trigger finger, right middle finger (03/19/2020), Trochanteric bursitis, left hip (06/03/2019), 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    Surgical History  Past Surgical History:   Procedure Laterality Date    BREAST SURGERY  03/02/2015    Breast Surgery    EYE SURGERY  03/02/2015    Eye Surgery    JOINT REPLACEMENT Right     knee replacement    KNEE ARTHROSCOPY W/ DEBRIDEMENT  04/30/2013    Knee Arthroscopy (Therapeutic)    MR NECK ANGIO WO IV CONTRAST  02/26/2016    MR NECK ANGIO WO IV CONTRAST 2/26/2016 [facility]    OTHER SURGICAL HISTORY  04/30/2013    Supracervical Hysterectomy Laparoscopic Uterus 250g Or Less    TONSILLECTOMY  04/30/2013    Tonsillectomy        Social History  She reports that she quit smoking about 73 years ago. Her smoking use included cigarettes. She has been exposed to tobacco smoke. She has never used smokeless tobacco. She reports that she does not drink alcohol and does not use drugs.    Family History  Family History   Problem Relation Name Age of Onset    Brain cancer Father      Coronary artery disease Brother          Allergies  Codeine, Darvocet a500 [propoxyphene n-acetaminophen], Dilaudid [hydromorphone], Gabapentin, and Propoxyphene-acetaminophen    Medications    Current Outpatient Medications:     acetaminophen (TYLENOL ORAL), Take by mouth., Disp: , Rfl:     alendronate (Fosamax) 70 mg tablet, Take 1 tablet (70 mg) by mouth every 7 days. IN AM W/ 6-8OZ PLAIN WATER. DO NOT EAT/LIE DOWN FOR 30 MIN AFTER, Disp: 12 tablet, Rfl: 1    apixaban (Eliquis) 5 mg tablet, Take 1 tablet (5 mg) by mouth 2 times a day., Disp: 180 tablet, Rfl: 1    blood pressure monitor kit, Use to check BP daily and as needed, Disp: 1  kit, Rfl: 0    blood sugar diagnostic (Premier Test Strip) strip, TEST BLOOD SUGARonce daily. E11.9, Disp: 100 each, Rfl: 1    cholecalciferol (Vitamin D-3) 25 MCG (1000 UT) capsule, Take 1 capsule (25 mcg) by mouth once daily., Disp: 90 capsule, Rfl: 1    DULoxetine (Cymbalta) 30 mg DR capsule, Take 1 capsule (30 mg) by mouth once daily., Disp: 90 capsule, Rfl: 1    fluticasone (Flonase) 50 mcg/actuation nasal spray, Administer 2 sprays into each nostril once daily. Shake gently. Before first use, prime pump. After use, clean tip and replace cap., Disp: 48 mL, Rfl: 1    folic acid (Folvite) 1 mg tablet, Take 1 tablet (1 mg) by mouth once daily., Disp: 90 tablet, Rfl: 1    FreeStyle glucose monitoring kit, 1 each if needed., Disp: , Rfl:     glimepiride (Amaryl) 2 mg tablet, Take 1 tablet (2 mg) by mouth 2 times a day., Disp: 180 tablet, Rfl: 1    insulin glargine (Lantus Solostar U-100 Insulin) 100 unit/mL (3 mL) pen, Inject 30 Units under the skin once daily at bedtime. Take as directed per insulin instructions., Disp: 30 mL, Rfl: 1    levothyroxine (Synthroid, Levoxyl) 25 mcg tablet, Take 1 tablet (25 mcg) by mouth once daily., Disp: 90 tablet, Rfl: 1    lisinopril 40 mg tablet, Take 1 tablet (40 mg) by mouth once daily., Disp: 90 tablet, Rfl: 1    loperamide (Imodium) 2 mg capsule, Take 1 capsule (2 mg) by mouth 4 times a day as needed for diarrhea., Disp: , Rfl:     magnesium hydroxide (Milk of Magnesia) 400 mg/5 mL suspension, Take 30 mL by mouth once daily as needed for constipation., Disp: , Rfl:     meclizine (Antivert) 25 mg tablet, Take 1 tablet (25 mg) by mouth 3 times a day as needed for dizziness., Disp: 30 tablet, Rfl: 1    omeprazole (PriLOSEC) 20 mg DR capsule, Take 1 capsule (20 mg) by mouth once daily in the morning. Take before meals. Do not crush or chew., Disp: 90 capsule, Rfl: 1    ondansetron (Zofran) 4 mg tablet, Take 1 tablet (4 mg) by mouth every 8 hours if needed for nausea or vomiting.,  "Disp: , Rfl:     pen needle, diabetic (BD Ultra-Fine Mini Pen Needle) 31 gauge x 3/16\" needle, INJECT 1 EACH UNDER THE SKIN ONCE DAILY. USE AS INSTRUCTED. USE TO INJECT INSULIN, Disp: 100 each, Rfl: 1    rosuvastatin (Crestor) 20 mg tablet, Take 1 tablet (20 mg) by mouth once daily., Disp: 90 tablet, Rfl: 1    tiZANidine (Zanaflex) 2 mg tablet, Take 1 tablet (2 mg) by mouth 2 times a day as needed for muscle spasms., Disp: 60 tablet, Rfl: 2    vit C/E/Zn/coppr/lutein/zeaxan (PRESERVISION AREDS-2 ORAL), Take 1 capsule by mouth 2 times a day., Disp: , Rfl:      Review of Systems   Constitutional: Negative.    HENT: Negative.     Eyes: Negative.    Respiratory: Negative.     Cardiovascular: Negative.    Gastrointestinal: Negative.    Endocrine: Negative.    Genitourinary: Negative.    Musculoskeletal:  Positive for arthralgias, back pain and myalgias. Negative for gait problem, joint swelling, neck pain and neck stiffness.   Skin: Negative.    Allergic/Immunologic: Negative.    Neurological: Negative.    Psychiatric/Behavioral: Negative.          Physical Exam  Vitals and nursing note reviewed.   HENT:      Head: Normocephalic.      Nose: Nose normal.   Eyes:      Extraocular Movements: Extraocular movements intact.      Conjunctiva/sclera: Conjunctivae normal.      Pupils: Pupils are equal, round, and reactive to light.   Cardiovascular:      Rate and Rhythm: Normal rate and regular rhythm.   Pulmonary:      Effort: Pulmonary effort is normal.      Breath sounds: Normal breath sounds.   Musculoskeletal:         General: Tenderness present. No swelling, deformity or signs of injury.      Cervical back: No rigidity or tenderness.      Lumbar back: Tenderness present.      Right lower leg: No edema.      Left lower leg: No edema.      Comments: Negative leg raise.  Positive for paraspinal tenderness at the lumbar region bilaterally at L2-L3, L3-L4 with rotation.  No radicular symptoms.  Positive for bilateral SI joint " pain on palpation  Presence of a short soft brace to left knee.   BUE 3-4/5, RLE 3-4/5.   Skin:     General: Skin is warm and dry.   Neurological:      General: No focal deficit present.      Mental Status: She is alert and oriented to person, place, and time.   Psychiatric:         Mood and Affect: Mood normal.         Behavior: Behavior normal.          Last Recorded Vitals  /84   Pulse 72   Temp 36.4 °C (97.6 °F) (Temporal)   Resp 16   Wt 70.3 kg (155 lb)     Relevant Results      Pain Management Panel  More data exists         Latest Ref Rng & Units 3/5/2024 12/4/2023   Pain Management Panel   Amphetamine Screen, Urine Presumptive Negative Presumptive Negative  Presumptive Negative    Barbiturate Screen, Urine Presumptive Negative Presumptive Negative  Presumptive Negative    Benzodiazepines Screen, Urine Presumptive Negative Presumptive Negative  Presumptive Negative    Fentanyl Screen, Urine Presumptive Negative Presumptive Negative  Presumptive Negative    Methadone Screen, Urine Presumptive Negative Presumptive Negative  -           Media Information            ----------------------------  Narrative & Impression  MRN: [id number]  Patient Name: PADMINI WATT     STUDY:  Lumbar Spine, 7 views.     INDICATION:  back  M48.061: Lumbar spinal stenosis M47.817: Lumbosacral  spondylosis.     COMPARISON:  03/13/2019.     ACCESSION NUMBER(S):  [id number]     ORDERING CLINICIAN:  ZACARIAS CROCKER     FINDINGS:  Mild dextroscoliosis centered in the lower thoracic region and mild  levoscoliosis centered in the mid lumbar spine.  Redemonstration of similar grade 1 L4-5 anterolisthesis. Moderate  facet arthropathy from L3-4 to L5-S1.  Moderate spondylosis at L2-3 and L3-4 with interval progression.  No spondylolysis or dynamic instability.  Atherosclerosis of the abdominal aorta.  Vertebral body heights are preserved. Posterior elements are intact.     IMPRESSION:  1. Multilevel lumbar spine degenerative changes as  described above  with interval progression since 2019.  2. Mild S-shaped scoliosis of the lumbar spine.  ---------------------    Assessment/Plan   Problem List Items Addressed This Visit             ICD-10-CM    Chronic bilateral low back pain without sciatica M54.50, G89.29    Relevant Orders    Radiofrequency Ablation    Facet arthropathy, lumbosacral - Primary M47.817    Relevant Orders    Radiofrequency Ablation    Sacroiliitis (CMS-HCC) M46.1     Other Visit Diagnoses         Codes    Medication management     Z79.899    Relevant Orders    Opiate/Opioid/Benzo Prescription Compliance    OOB Internal Tracking                   1. Facet arthropathy, lumbosacral  - Radiofrequency Ablation; Future    2. Chronic bilateral low back pain without sciatica  - Radiofrequency Ablation; Future    3. Sacroiliitis (CMS-[facility])  Continue taking Tylenol.  We will address SI RFA in your next visit.    4. Medication management  - Opiate/Opioid/Benzo Prescription Compliance; Future  - Opiate/Opioid/Benzo Prescription Compliance  - OOB Internal Tracking       Plan/Follow-up Instructions:     Continue with your prescribed medications    ---------------    Your next appointment is with Dr. Way in:    [facility]    For pain block/injection on: 6/14/2024, bilateral L2-L3, L3-L4 radiofrequency ablation    LOCAL    Stop Eliquis for 2 days    °You will receive a phone call the weekday before your appointment/procedure.   °Please KEEP your scheduled appointments.     °BRING your photo ID, insurance information, and a correct list of your home medications to EVERY visit.    °Please call our office at [phone] if you have any questions.     You will then be seen for a postprocedure follow-up in 8 to 12 weeks    Antibiotic: You will receive Cipro before the procedure due to your history of right total knee replacement    ---------------        Disclaimer: This note was created using voice recognition software. It was not  corrected for typographical or grammatical errors, inadvertent word insertion, or any unintended errors. Please feel free to contact me for clarification.      Time     Prep time on date of the patient encounter: 2  Time spent directly with patient/family/caregiver: 30   Documentation time: 2  Total time on date of patient encounter: 34        Rosaura Ferro DNP, APRN, FNP-C    [facility]  Office #: [phone]  Fax # [phone]

## 2024-06-05 NOTE — H&P (VIEW-ONLY)
"Subjective   Patient ID: Zhanna Perez \"Juan Alberto\" is a 89 y.o. female who presents for Follow-up (Post procedure follow up).    Zhanna is a pleasant 89-year-old  female who is here for postprocedure follow-up.  Patient presents in her wheelchair.  She ambulates in short distances.  She arrives with her son.  Patient is wearing soft brace to her left knee.    Patient had bilateral L2-L3, L3-L4 diagnostic medial branch block #2 on 5/14/2024.  The injection has improved her back pain.  It provided 80% relief.  It lasted for several weeks.  The injection has improved her pain, ability to participate in her daily activities, ability to participate in physical therapy and enjoy social activities.  Preprocedure pain level was 6 out of 10, postprocedure was 1 out of 10.    Patient continues to have lower back pain.  She rates her pain as 6 out of 10.  Pain comes and goes but can be constant depending on her activities.  Prolonged standing aggravates her back pain.  She describes her pain as spastic.  She denies pain, numbness or tingling sensation to her legs.  She denies leg weakness or change in balance.  She denies recent falls or injuries.  However patient is having left knee pain.  Patient reports that she went to her PCP where they did an x-ray which did not show anything.  An MRI was ordered which shows nondisplaced fracture.  She has a follow-up appointment with Ortho for the locking brace.  Patient reports that she was not aware of this fracture as there was no injury that she can recall.    Patient continues to take Tylenol for pain relief.  She is doing physical therapy at the [facility] with minimal effect to her back pain.    I reviewed previous notes, procedures and imaging.  I discussed the plan of care including pharmacologic and joint interventional procedure.  I discussed lumbar RFA and she is in agreement to proceed to this procedure.  This is done under fluoroscopy.  Questions were " answered during this encounter.    -------------  PROCEDURES:    5/14/2024: Bilateral L2-L3, L3-L4 diagnostic MBB #2  4/2/2024: Bilateral L2-L3, L3-L4 diagnostic MBB #1  1/30/2024: Bilateral SI joint injection  12/11/2023: Right L4-L5, L5-S1 RFA  8/25/2023: Right SI joint injection  7/18/2023: Left L5 and S1 TFESI  12/7/2022: Left L3-L4, right L4-L5 TFESI with Dr. aBnks  9/16/2022: Right L4-L5, L5-S1 RFA with Dr. Banks  ---------------          OARRS:  PANDA Javeir-CNP, DNP on 6/5/2024  1:09 PM  I have personally reviewed the OARRS report for Zhanna Perez. I have considered the risks of abuse, dependence, addiction and diversion    Past Medical History  She has a past medical history of Age-related cognitive decline (03/31/2015), Body mass index (BMI) 32.0-32.9, adult (10/28/2020), Bursitis of right shoulder (08/21/2018), Encounter for general adult medical examination without abnormal findings (04/01/2016), Encounter for other preprocedural examination (08/30/2016), Erythematous condition, unspecified (05/09/2018), Mastitis without abscess (05/09/2018), Mastodynia (05/09/2018), Mastodynia (05/09/2018), Obesity, unspecified (08/02/2021), Obesity, unspecified (10/28/2020), Obesity, unspecified (09/15/2021), Obesity, unspecified (05/03/2021), Occipital neuralgia (05/12/2016), Other conditions influencing health status (01/28/2020), Other conditions influencing health status (04/07/2017), Pain in right shoulder (07/05/2018), Pain in unspecified hip (12/29/2014), Personal history of other diseases of the female genital tract (05/09/2018), Personal history of other diseases of the musculoskeletal system and connective tissue, Personal history of other diseases of the nervous system and sense organs (08/23/2017), Personal history of other diseases of the nervous system and sense organs (03/27/2014), Personal history of other diseases of the respiratory system (06/27/2016), Personal history of other diseases of  the respiratory system (06/19/2020), Personal history of other infectious and parasitic diseases (02/25/2016), Trigger finger, right middle finger (03/19/2020), Trochanteric bursitis, left hip (06/03/2019), 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    Surgical History  Past Surgical History:   Procedure Laterality Date    BREAST SURGERY  03/02/2015    Breast Surgery    EYE SURGERY  03/02/2015    Eye Surgery    JOINT REPLACEMENT Right     knee replacement    KNEE ARTHROSCOPY W/ DEBRIDEMENT  04/30/2013    Knee Arthroscopy (Therapeutic)    MR NECK ANGIO WO IV CONTRAST  02/26/2016    MR NECK ANGIO WO IV CONTRAST 2/26/2016 [facility]    OTHER SURGICAL HISTORY  04/30/2013    Supracervical Hysterectomy Laparoscopic Uterus 250g Or Less    TONSILLECTOMY  04/30/2013    Tonsillectomy        Social History  She reports that she quit smoking about 73 years ago. Her smoking use included cigarettes. She has been exposed to tobacco smoke. She has never used smokeless tobacco. She reports that she does not drink alcohol and does not use drugs.    Family History  Family History   Problem Relation Name Age of Onset    Brain cancer Father      Coronary artery disease Brother          Allergies  Codeine, Darvocet a500 [propoxyphene n-acetaminophen], Dilaudid [hydromorphone], Gabapentin, and Propoxyphene-acetaminophen    Medications    Current Outpatient Medications:     acetaminophen (TYLENOL ORAL), Take by mouth., Disp: , Rfl:     alendronate (Fosamax) 70 mg tablet, Take 1 tablet (70 mg) by mouth every 7 days. IN AM W/ 6-8OZ PLAIN WATER. DO NOT EAT/LIE DOWN FOR 30 MIN AFTER, Disp: 12 tablet, Rfl: 1    apixaban (Eliquis) 5 mg tablet, Take 1 tablet (5 mg) by mouth 2 times a day., Disp: 180 tablet, Rfl: 1    blood pressure monitor kit, Use to check BP daily and as needed, Disp: 1  kit, Rfl: 0    blood sugar diagnostic (Premier Test Strip) strip, TEST BLOOD SUGARonce daily. E11.9, Disp: 100 each, Rfl: 1    cholecalciferol (Vitamin D-3) 25 MCG (1000 UT) capsule, Take 1 capsule (25 mcg) by mouth once daily., Disp: 90 capsule, Rfl: 1    DULoxetine (Cymbalta) 30 mg DR capsule, Take 1 capsule (30 mg) by mouth once daily., Disp: 90 capsule, Rfl: 1    fluticasone (Flonase) 50 mcg/actuation nasal spray, Administer 2 sprays into each nostril once daily. Shake gently. Before first use, prime pump. After use, clean tip and replace cap., Disp: 48 mL, Rfl: 1    folic acid (Folvite) 1 mg tablet, Take 1 tablet (1 mg) by mouth once daily., Disp: 90 tablet, Rfl: 1    FreeStyle glucose monitoring kit, 1 each if needed., Disp: , Rfl:     glimepiride (Amaryl) 2 mg tablet, Take 1 tablet (2 mg) by mouth 2 times a day., Disp: 180 tablet, Rfl: 1    insulin glargine (Lantus Solostar U-100 Insulin) 100 unit/mL (3 mL) pen, Inject 30 Units under the skin once daily at bedtime. Take as directed per insulin instructions., Disp: 30 mL, Rfl: 1    levothyroxine (Synthroid, Levoxyl) 25 mcg tablet, Take 1 tablet (25 mcg) by mouth once daily., Disp: 90 tablet, Rfl: 1    lisinopril 40 mg tablet, Take 1 tablet (40 mg) by mouth once daily., Disp: 90 tablet, Rfl: 1    loperamide (Imodium) 2 mg capsule, Take 1 capsule (2 mg) by mouth 4 times a day as needed for diarrhea., Disp: , Rfl:     magnesium hydroxide (Milk of Magnesia) 400 mg/5 mL suspension, Take 30 mL by mouth once daily as needed for constipation., Disp: , Rfl:     meclizine (Antivert) 25 mg tablet, Take 1 tablet (25 mg) by mouth 3 times a day as needed for dizziness., Disp: 30 tablet, Rfl: 1    omeprazole (PriLOSEC) 20 mg DR capsule, Take 1 capsule (20 mg) by mouth once daily in the morning. Take before meals. Do not crush or chew., Disp: 90 capsule, Rfl: 1    ondansetron (Zofran) 4 mg tablet, Take 1 tablet (4 mg) by mouth every 8 hours if needed for nausea or vomiting.,  "Disp: , Rfl:     pen needle, diabetic (BD Ultra-Fine Mini Pen Needle) 31 gauge x 3/16\" needle, INJECT 1 EACH UNDER THE SKIN ONCE DAILY. USE AS INSTRUCTED. USE TO INJECT INSULIN, Disp: 100 each, Rfl: 1    rosuvastatin (Crestor) 20 mg tablet, Take 1 tablet (20 mg) by mouth once daily., Disp: 90 tablet, Rfl: 1    tiZANidine (Zanaflex) 2 mg tablet, Take 1 tablet (2 mg) by mouth 2 times a day as needed for muscle spasms., Disp: 60 tablet, Rfl: 2    vit C/E/Zn/coppr/lutein/zeaxan (PRESERVISION AREDS-2 ORAL), Take 1 capsule by mouth 2 times a day., Disp: , Rfl:      Review of Systems   Constitutional: Negative.    HENT: Negative.     Eyes: Negative.    Respiratory: Negative.     Cardiovascular: Negative.    Gastrointestinal: Negative.    Endocrine: Negative.    Genitourinary: Negative.    Musculoskeletal:  Positive for arthralgias, back pain and myalgias. Negative for gait problem, joint swelling, neck pain and neck stiffness.   Skin: Negative.    Allergic/Immunologic: Negative.    Neurological: Negative.    Psychiatric/Behavioral: Negative.          Physical Exam  Vitals and nursing note reviewed.   HENT:      Head: Normocephalic.      Nose: Nose normal.   Eyes:      Extraocular Movements: Extraocular movements intact.      Conjunctiva/sclera: Conjunctivae normal.      Pupils: Pupils are equal, round, and reactive to light.   Cardiovascular:      Rate and Rhythm: Normal rate and regular rhythm.   Pulmonary:      Effort: Pulmonary effort is normal.      Breath sounds: Normal breath sounds.   Musculoskeletal:         General: Tenderness present. No swelling, deformity or signs of injury.      Cervical back: No rigidity or tenderness.      Lumbar back: Tenderness present.      Right lower leg: No edema.      Left lower leg: No edema.      Comments: Negative leg raise.  Positive for paraspinal tenderness at the lumbar region bilaterally at L2-L3, L3-L4 with rotation.  No radicular symptoms.  Positive for bilateral SI joint " pain on palpation  Presence of a short soft brace to left knee.   BUE 3-4/5, RLE 3-4/5.   Skin:     General: Skin is warm and dry.   Neurological:      General: No focal deficit present.      Mental Status: She is alert and oriented to person, place, and time.   Psychiatric:         Mood and Affect: Mood normal.         Behavior: Behavior normal.          Last Recorded Vitals  /84   Pulse 72   Temp 36.4 °C (97.6 °F) (Temporal)   Resp 16   Wt 70.3 kg (155 lb)     Relevant Results      Pain Management Panel  More data exists         Latest Ref Rng & Units 3/5/2024 12/4/2023   Pain Management Panel   Amphetamine Screen, Urine Presumptive Negative Presumptive Negative  Presumptive Negative    Barbiturate Screen, Urine Presumptive Negative Presumptive Negative  Presumptive Negative    Benzodiazepines Screen, Urine Presumptive Negative Presumptive Negative  Presumptive Negative    Fentanyl Screen, Urine Presumptive Negative Presumptive Negative  Presumptive Negative    Methadone Screen, Urine Presumptive Negative Presumptive Negative  -           Media Information            ----------------------------  Narrative & Impression  MRN: [id number]  Patient Name: PADMINI WATT     STUDY:  Lumbar Spine, 7 views.     INDICATION:  back  M48.061: Lumbar spinal stenosis M47.817: Lumbosacral  spondylosis.     COMPARISON:  03/13/2019.     ACCESSION NUMBER(S):  [id number]     ORDERING CLINICIAN:  ZACARIAS CROCKER     FINDINGS:  Mild dextroscoliosis centered in the lower thoracic region and mild  levoscoliosis centered in the mid lumbar spine.  Redemonstration of similar grade 1 L4-5 anterolisthesis. Moderate  facet arthropathy from L3-4 to L5-S1.  Moderate spondylosis at L2-3 and L3-4 with interval progression.  No spondylolysis or dynamic instability.  Atherosclerosis of the abdominal aorta.  Vertebral body heights are preserved. Posterior elements are intact.     IMPRESSION:  1. Multilevel lumbar spine degenerative changes as  described above  with interval progression since 2019.  2. Mild S-shaped scoliosis of the lumbar spine.  ---------------------    Assessment/Plan   Problem List Items Addressed This Visit             ICD-10-CM    Chronic bilateral low back pain without sciatica M54.50, G89.29    Relevant Orders    Radiofrequency Ablation    Facet arthropathy, lumbosacral - Primary M47.817    Relevant Orders    Radiofrequency Ablation    Sacroiliitis (CMS-HCC) M46.1     Other Visit Diagnoses         Codes    Medication management     Z79.899    Relevant Orders    Opiate/Opioid/Benzo Prescription Compliance    OOB Internal Tracking                   1. Facet arthropathy, lumbosacral  - Radiofrequency Ablation; Future    2. Chronic bilateral low back pain without sciatica  - Radiofrequency Ablation; Future    3. Sacroiliitis (CMS-[facility])  Continue taking Tylenol.  We will address SI RFA in your next visit.    4. Medication management  - Opiate/Opioid/Benzo Prescription Compliance; Future  - Opiate/Opioid/Benzo Prescription Compliance  - OOB Internal Tracking       Plan/Follow-up Instructions:     Continue with your prescribed medications    ---------------    Your next appointment is with Dr. Way in:    [facility]    For pain block/injection on: 6/14/2024, bilateral L2-L3, L3-L4 radiofrequency ablation    LOCAL    Stop Eliquis for 2 days    °You will receive a phone call the weekday before your appointment/procedure.   °Please KEEP your scheduled appointments.     °BRING your photo ID, insurance information, and a correct list of your home medications to EVERY visit.    °Please call our office at [phone] if you have any questions.     You will then be seen for a postprocedure follow-up in 8 to 12 weeks    Antibiotic: You will receive Cipro before the procedure due to your history of right total knee replacement    ---------------        Disclaimer: This note was created using voice recognition software. It was not  corrected for typographical or grammatical errors, inadvertent word insertion, or any unintended errors. Please feel free to contact me for clarification.      Time     Prep time on date of the patient encounter: 2  Time spent directly with patient/family/caregiver: 30   Documentation time: 2  Total time on date of patient encounter: 34        Rosaura Ferro DNP, APRN, FNP-C    [facility]  Office #: [phone]  Fax # [phone]

## 2024-06-05 NOTE — PROGRESS NOTES
Last urine drug screening date/ordered today: 06/05/24     Results of last screen: as expected      Last opioid risk screening date/ordered today: 12/4/2023      Pain Scale Screening:   Pain Assessment and Documentation Tool (PADT)   Date of Assessment: 6/4/2024  Analgesia:   Patient reports her pain level on average during the past week is 6on a 0 - 10 scale.   Patient reports that her pain level at its worst during the past week was 9 on a 0 -10 scale.   50% of pain has been relieved during the past week per patient   Patient states that the amount of pain relief she is now obtaining from her current pain reliever(s) is enough to make a real difference in her life.   Query to clinician: Is the patient's pain relief clinically significant? yes  Activities of Daily Living:   Physical functioning: better  Family relationships: better  Social relationships: better  Mood: better  Sleep patterns: unchanged  Overall functioning: better  Adverse Events: No, Zhanna Perez is not experiencing side effects from current pain reliever.  Patients overall severity of side effect:none  Specific Analgesic Plan: Continue present regimen.

## 2024-06-05 NOTE — PATIENT INSTRUCTIONS
Continue with your prescribed medications    ---------------    Your next appointment is with Dr. Way in:    [facility]    For pain block/injection on: 6/14/2024, bilateral L2-L3, L3-L4 radiofrequency ablation    LOCAL    Stop Eliquis for 2 days    °You will receive a phone call the weekday before your appointment/procedure.   °Please KEEP your scheduled appointments.     °BRING your photo ID, insurance information, and a correct list of your home medications to EVERY visit.    °Please call our office at [phone] if you have any questions.     You will then be seen for a postprocedure follow-up in 8 to 12 weeks    Antibiotic: You will receive Cipro before the procedure due to your history of right total knee replacement    ---------------

## 2024-06-06 LAB
AMPHETAMINES UR QL SCN: NORMAL
BARBITURATES UR QL SCN: NORMAL
BZE UR QL SCN: NORMAL
CANNABINOIDS UR QL SCN: NORMAL
CREAT UR-MCNC: 43.5 MG/DL (ref 20–320)
PCP UR QL SCN: NORMAL

## 2024-06-07 ENCOUNTER — APPOINTMENT (OUTPATIENT)
Dept: RADIOLOGY | Facility: HOSPITAL | Age: 89
End: 2024-06-07
Payer: MEDICARE

## 2024-06-10 ENCOUNTER — OFFICE VISIT (OUTPATIENT)
Dept: ORTHOPEDIC SURGERY | Facility: CLINIC | Age: 89
End: 2024-06-10
Payer: MEDICARE

## 2024-06-10 VITALS — WEIGHT: 155 LBS | BODY MASS INDEX: 33.54 KG/M2

## 2024-06-10 DIAGNOSIS — G89.29 CHRONIC PAIN OF LEFT KNEE: Primary | ICD-10-CM

## 2024-06-10 DIAGNOSIS — M17.12 ARTHRITIS OF KNEE, LEFT: ICD-10-CM

## 2024-06-10 DIAGNOSIS — M25.562 CHRONIC PAIN OF LEFT KNEE: Primary | ICD-10-CM

## 2024-06-10 DIAGNOSIS — S72.409A CLOSED FRACTURE OF DISTAL END OF FEMUR, UNSPECIFIED FRACTURE MORPHOLOGY, INITIAL ENCOUNTER (MULTI): ICD-10-CM

## 2024-06-10 PROCEDURE — 1159F MED LIST DOCD IN RCRD: CPT

## 2024-06-10 PROCEDURE — 1123F ACP DISCUSS/DSCN MKR DOCD: CPT

## 2024-06-10 PROCEDURE — 1036F TOBACCO NON-USER: CPT

## 2024-06-10 PROCEDURE — 99213 OFFICE O/P EST LOW 20 MIN: CPT

## 2024-06-10 ASSESSMENT — PAIN SCALES - GENERAL: PAINLEVEL_OUTOF10: 5 - MODERATE PAIN

## 2024-06-10 ASSESSMENT — PAIN - FUNCTIONAL ASSESSMENT: PAIN_FUNCTIONAL_ASSESSMENT: 0-10

## 2024-06-10 ASSESSMENT — PAIN DESCRIPTION - DESCRIPTORS: DESCRIPTORS: ACHING

## 2024-06-10 NOTE — PROGRESS NOTES
HPI  Zhanna Perez is a 89 y.o. female, accompanied by her daughters, in office today for follow up of side: left knee distal femur stress fracture.  she is doing a lot better than prior.  Pain has greatly improved and she is walking more and able to do therapy more easily.  She was given brace, but brace is falling down and not working for her.      Physical Exam  Constitutional: well developed, well nourished female in no acute distress  Psychiatric: normal mood, appropriate affect  Eyes: sclera anicteric  HENT: normocephalic/atraumatic  CV: regular rate and rhythm   Respiratory: non labored breathing  Integumentary: no rash  Neurological: moves all extremities    Left Knee Exam     Tenderness   The patient is experiencing no tenderness.     Other   Erythema: absent  Scars: absent  Swelling: none  Effusion: no effusion present          Imaging/Lab:  No new imaging today.  Reviewed x-rays from 4/18/24 and MRI results from 5/29/24 which show a nondisplaced fracture of the distal femoral metaphysis posteriorly in the intercondylar region.  Severe arthritis, macerated tearing of the medial meniscus similar to prior MRI from 2020.    Assessment  Assessment: left distal femur fracture, left knee osteoarthritis, left knee pain    Plan  Plan:  History, physical exam, and imaging were reviewed with patient. Patient to continue to do her PT using less weights that normal.  IF she has pain, she should stop that activity.  She can continue to progress her WB with her walker as long as she is pain free.  Brace not needed if it is not giving her any stability.  Pain medication as needed.  Follow Up: 4 weeks with new x-ray    All questions were answered for the patient prior to end of exam and patient addressed their understanding.    Vianey Aiken PA-C  06/10/24

## 2024-06-14 ENCOUNTER — HOSPITAL ENCOUNTER (OUTPATIENT)
Dept: PAIN MEDICINE | Facility: HOSPITAL | Age: 89
Discharge: HOME | End: 2024-06-14
Payer: MEDICARE

## 2024-06-14 ENCOUNTER — HOSPITAL ENCOUNTER (OUTPATIENT)
Dept: RADIOLOGY | Facility: HOSPITAL | Age: 89
Discharge: HOME | End: 2024-06-14
Payer: MEDICARE

## 2024-06-14 VITALS
RESPIRATION RATE: 18 BRPM | HEIGHT: 57 IN | SYSTOLIC BLOOD PRESSURE: 178 MMHG | DIASTOLIC BLOOD PRESSURE: 84 MMHG | TEMPERATURE: 97.3 F | OXYGEN SATURATION: 100 % | WEIGHT: 150 LBS | BODY MASS INDEX: 32.36 KG/M2 | HEART RATE: 77 BPM

## 2024-06-14 DIAGNOSIS — M47.817 FACET ARTHROPATHY, LUMBOSACRAL: ICD-10-CM

## 2024-06-14 DIAGNOSIS — R52 PAIN: ICD-10-CM

## 2024-06-14 DIAGNOSIS — M54.50 CHRONIC BILATERAL LOW BACK PAIN WITHOUT SCIATICA: ICD-10-CM

## 2024-06-14 DIAGNOSIS — G89.29 CHRONIC BILATERAL LOW BACK PAIN WITHOUT SCIATICA: ICD-10-CM

## 2024-06-14 PROCEDURE — 2500000004 HC RX 250 GENERAL PHARMACY W/ HCPCS (ALT 636 FOR OP/ED): Performed by: ANESTHESIOLOGY

## 2024-06-14 PROCEDURE — 2720000007 HC OR 272 NO HCPCS

## 2024-06-14 PROCEDURE — 2500000005 HC RX 250 GENERAL PHARMACY W/O HCPCS: Performed by: ANESTHESIOLOGY

## 2024-06-14 PROCEDURE — 2500000001 HC RX 250 WO HCPCS SELF ADMINISTERED DRUGS (ALT 637 FOR MEDICARE OP)

## 2024-06-14 RX ORDER — CIPROFLOXACIN 500 MG/1
500 TABLET ORAL ONCE
Status: DISCONTINUED | OUTPATIENT
Start: 2024-06-14 | End: 2024-06-15 | Stop reason: HOSPADM

## 2024-06-14 RX ORDER — LIDOCAINE HYDROCHLORIDE 10 MG/ML
INJECTION, SOLUTION EPIDURAL; INFILTRATION; INTRACAUDAL; PERINEURAL AS NEEDED
Status: COMPLETED | OUTPATIENT
Start: 2024-06-14 | End: 2024-06-14

## 2024-06-14 RX ORDER — CIPROFLOXACIN 500 MG/1
TABLET ORAL
Status: COMPLETED
Start: 2024-06-14 | End: 2024-06-14

## 2024-06-14 RX ORDER — LIDOCAINE HYDROCHLORIDE 20 MG/ML
INJECTION, SOLUTION EPIDURAL; INFILTRATION; INTRACAUDAL; PERINEURAL AS NEEDED
Status: COMPLETED | OUTPATIENT
Start: 2024-06-14 | End: 2024-06-14

## 2024-06-14 RX ORDER — METHYLPREDNISOLONE ACETATE 80 MG/ML
INJECTION, SUSPENSION INTRA-ARTICULAR; INTRALESIONAL; INTRAMUSCULAR; SOFT TISSUE AS NEEDED
Status: COMPLETED | OUTPATIENT
Start: 2024-06-14 | End: 2024-06-14

## 2024-06-14 RX ORDER — BUPIVACAINE HYDROCHLORIDE 2.5 MG/ML
INJECTION, SOLUTION EPIDURAL; INFILTRATION; INTRACAUDAL AS NEEDED
Status: COMPLETED | OUTPATIENT
Start: 2024-06-14 | End: 2024-06-14

## 2024-06-14 ASSESSMENT — ENCOUNTER SYMPTOMS
DEPRESSION: 0
LOSS OF SENSATION IN FEET: 0
OCCASIONAL FEELINGS OF UNSTEADINESS: 1

## 2024-06-14 ASSESSMENT — PAIN - FUNCTIONAL ASSESSMENT
PAIN_FUNCTIONAL_ASSESSMENT: 0-10
PAIN_FUNCTIONAL_ASSESSMENT: 0-10

## 2024-06-14 ASSESSMENT — PATIENT HEALTH QUESTIONNAIRE - PHQ9
2. FEELING DOWN, DEPRESSED OR HOPELESS: NOT AT ALL
SUM OF ALL RESPONSES TO PHQ9 QUESTIONS 1 AND 2: 0
1. LITTLE INTEREST OR PLEASURE IN DOING THINGS: NOT AT ALL

## 2024-06-14 ASSESSMENT — PAIN SCALES - GENERAL
PAINLEVEL_OUTOF10: 8
PAINLEVEL_OUTOF10: 0 - NO PAIN

## 2024-07-08 ENCOUNTER — HOSPITAL ENCOUNTER (OUTPATIENT)
Dept: RADIOLOGY | Facility: HOSPITAL | Age: 89
Discharge: HOME | End: 2024-07-08
Payer: MEDICARE

## 2024-07-08 ENCOUNTER — APPOINTMENT (OUTPATIENT)
Dept: ORTHOPEDIC SURGERY | Facility: CLINIC | Age: 89
End: 2024-07-08
Payer: MEDICARE

## 2024-07-08 VITALS — BODY MASS INDEX: 32.36 KG/M2 | HEIGHT: 57 IN | WEIGHT: 150 LBS

## 2024-07-08 DIAGNOSIS — S72.409A CLOSED FRACTURE OF DISTAL END OF FEMUR, UNSPECIFIED FRACTURE MORPHOLOGY, INITIAL ENCOUNTER (MULTI): ICD-10-CM

## 2024-07-08 DIAGNOSIS — G89.29 CHRONIC PAIN OF LEFT KNEE: ICD-10-CM

## 2024-07-08 DIAGNOSIS — M25.562 CHRONIC PAIN OF LEFT KNEE: ICD-10-CM

## 2024-07-08 DIAGNOSIS — S72.409A CLOSED FRACTURE OF DISTAL END OF FEMUR, UNSPECIFIED FRACTURE MORPHOLOGY, INITIAL ENCOUNTER (MULTI): Primary | ICD-10-CM

## 2024-07-08 DIAGNOSIS — M17.12 ARTHRITIS OF KNEE, LEFT: ICD-10-CM

## 2024-07-08 PROCEDURE — 1159F MED LIST DOCD IN RCRD: CPT

## 2024-07-08 PROCEDURE — 1160F RVW MEDS BY RX/DR IN RCRD: CPT

## 2024-07-08 PROCEDURE — 99213 OFFICE O/P EST LOW 20 MIN: CPT

## 2024-07-08 PROCEDURE — 1036F TOBACCO NON-USER: CPT

## 2024-07-08 PROCEDURE — 73564 X-RAY EXAM KNEE 4 OR MORE: CPT | Mod: LT

## 2024-07-08 PROCEDURE — 1123F ACP DISCUSS/DSCN MKR DOCD: CPT

## 2024-07-08 ASSESSMENT — PAIN - FUNCTIONAL ASSESSMENT: PAIN_FUNCTIONAL_ASSESSMENT: 0-10

## 2024-07-08 ASSESSMENT — PAIN SCALES - GENERAL: PAINLEVEL_OUTOF10: 5 - MODERATE PAIN

## 2024-07-08 ASSESSMENT — PAIN DESCRIPTION - DESCRIPTORS: DESCRIPTORS: ACHING

## 2024-07-08 NOTE — PROGRESS NOTES
HPI  Zhanna Perez is a 89 y.o. female in office today for follow up of side: left femur fracture.  she states she continues to feel better with the left knee.  Still some pain lateral side of knee and around knee cap.  Unsure if pain is related to healing fracture versus her underlying arthritis.      Physical Exam  Constitutional: well developed, well nourished female in no acute distress.  In wheelchair in office.  Psychiatric: normal mood, appropriate affect  Eyes: sclera anicteric  HENT: normocephalic/atraumatic  CV: regular rate and rhythm   Respiratory: non labored breathing  Integumentary: no rash  Neurological: moves all extremities    Left Knee Exam     Tenderness   The patient is experiencing tenderness in the patella, lateral joint line and medial joint line.    Range of Motion   Extension:  0   Flexion:  120     Other   Erythema: absent  Scars: present  Sensation: normal  Swelling: mild  Effusion: no effusion present            Imaging/Lab:  X-rays were taken today which were reviewed by myself and read by myself and show small amount of callus distal femur fracture.  Severe osteroarthritis about the knee, worst medial compartment.    Assessment  Assessment: Distal femur stress fracture    Plan  Plan:  History, physical exam, and imaging were reviewed with patient. Discussed continuing to progress back to her normal activities with the knee.  She does not want to his this knee replaced. For her underlying pain discussed steroid injection, PT, and gel injection. The last steroid injection did not help so would like to try the gel injection.  Prescription entered for the gel and will send for insurance authorization. She will continue with PT and exercising at the nursing facility.  Follow Up: We will follow up on the gel injections.    All questions were answered for the patient prior to end of exam and patient addressed their understanding.    Vianey Aiken PA-C  07/08/24

## 2024-07-09 ENCOUNTER — TELEPHONE (OUTPATIENT)
Dept: ORTHOPEDIC SURGERY | Facility: CLINIC | Age: 89
End: 2024-07-09
Payer: MEDICARE

## 2024-07-09 ENCOUNTER — SPECIALTY PHARMACY (OUTPATIENT)
Dept: PHARMACY | Facility: CLINIC | Age: 89
End: 2024-07-09

## 2024-07-09 DIAGNOSIS — S72.409A CLOSED FRACTURE OF DISTAL END OF FEMUR, UNSPECIFIED FRACTURE MORPHOLOGY, INITIAL ENCOUNTER (MULTI): ICD-10-CM

## 2024-07-09 NOTE — TELEPHONE ENCOUNTER
7/8/24 lt femur fx  PT called and wanted to know if there were any changes in her PT order. Last time she could be wt bearing as tolerated but no weights for her legs. Were there any changes or she is just to continue to be wt bearing as tolerated no weights?    Pt-Jada  Phone: [phone]  Fax: [phone]

## 2024-07-09 NOTE — TELEPHONE ENCOUNTER
Called [facility] in PT  we changed her PT order per Vianey Aiken PA-C to gently start weight resisting training as tolerated

## 2024-08-01 ASSESSMENT — ENCOUNTER SYMPTOMS
SINUS PRESSURE: 0
SINUS PAIN: 0
PALPITATIONS: 0
SHORTNESS OF BREATH: 0
HEMATOLOGIC/LYMPHATIC NEGATIVE: 1
ENDOCRINE NEGATIVE: 1
ABDOMINAL DISTENTION: 0
DIARRHEA: 0
ACTIVITY CHANGE: 0
CONSTIPATION: 0
DIZZINESS: 0
LIGHT-HEADEDNESS: 0
RHINORRHEA: 0
ALLERGIC/IMMUNOLOGIC NEGATIVE: 1
WHEEZING: 0
CHEST TIGHTNESS: 0
PSYCHIATRIC NEGATIVE: 1

## 2024-08-01 NOTE — PROGRESS NOTES
"Subjective   Patient ID: Zhanna Perez \"Alphonse" is a 89 y.o. female who presents for Diabetes (Blood sugars were going low so has been holding lantus for last 2-3 weeks) and Med Refill (Of all meds).    Living at [facility]. Doing well.  She is participating in activities there.  Receiving PT/OT.  Her balance has improved.  Bilateral lower leg edema improved with furosemide and stopping amlodipine.  Educated regarding low-sodium diet, elevating legs throughout the day, compression socks.  Diabetes, not controlled,  A1c 8.5%.  Blood sugars have been running low, nursing staff at [facility] had decreased her Lantus, last taking 8 units nightly.  Was discontinued by me 2 to 3 weeks ago.  Currently taking glimepiride 2 mg twice a day.  Blood sugar this morning 261, A1c 8.5%.  Decrease glimepiride 2 mg to once a day with breakfast.  Resume Lantus at 10 units at bedtime.  Continue to monitor blood sugar 3 times a day and as needed.  GERD, controlled with omeprazole 20 mg every morning.   Bilateral pulmonary emboli in August 2021. Patient saw hematology, further testing for coagulopathy was done. Hematology does not believe she has thrombophilia. Believes PE was provoked related to spinal stenosis and decreased mobility. Recommends staying on Eliquis long-term due to her risk of recurrent immobility. Hematology cleared her for spinal injections with holding Eliquis for 3 days prior to the procedure and resuming the day after.   Lumbar spondylosis, lumbar radiculitis.  Following with pain management, receiving injections.  Hypertension, controlled, continue lisinopril 40 mg daily.  Osteoporosis, taking alendronate 70 mg weekly. Taking Os-João daily. Vitamin D deficiency taking vitamin D 25 mcg daily.  DEXA due 2024.  Following with Ortho for chronic knee pain.  Found to have distal fracture femur.  She was placed in a brace but has not been wearing.  Dyslipidemia, taking rosuvastatin 20 mg daily.  Recommend " low fat/low cholesterol diet, eat more fresh foods, avoid processed/prepackaged/fast foods, increase physical activity, weight loss. Recommend Mediterranean diet.  Levothyroxine, controlled, taking levothyroxine 25 mcg daily.  Taking Cymbalta 30 mg daily for arthritis and depression, controlled  Due for complete labs, plans to complete this morning    Preventive:  CRC screen: Aged out  Mammogram: 10/2022 negative, declines  DEXA scan: 11/2022 osteoporosis   Ophthalmology: UTD  Podiatry: UTD  Urine albumin: 11/2023    ACEi/ARB: Yes  Statin: Yes  ASA: No        The ASCVD Risk score (Pacheco MORRISSEY, et al., 2019) failed to calculate for the following reasons:    The 2019 ASCVD risk score is only valid for ages 40 to 79    Office Visit on 08/12/2024   Component Date Value Ref Range Status    POC Fingerstick Blood Glucose 08/12/2024 261 (A)  70 - 100 mg/dl Final    POC HEMOGLOBIN A1c 08/12/2024 8.5 (A)  4.2 - 6.5 % Final   Office Visit on 06/05/2024   Component Date Value Ref Range Status    Creatinine, Urine Random 06/05/2024 43.5  20.0 - 320.0 mg/dL Final    A urine creatinine result >= 20 mg/dL is considered valid without suspicion of dilution.  Samples with results below this range will automatically reflex to specific  gravity testing to verify specimen integrity.    Amphetamine Screen, Urine 06/05/2024 Presumptive Negative  Presumptive Negative Final    CUTOFF LEVEL: 500 NG/ML   Cross-reactivity has been reported with high concentrations   of the following drugs: buproprion, chloroquine, chlorpromazine,   ephedrine, mephentermine, fenfluramine, phentermine,   phenylpropanolamine, pseudoephedrine, and propranolol.    Barbiturate Screen, Urine 06/05/2024 Presumptive Negative  Presumptive Negative Final    CUTOFF LEVEL: 200 NG/ML    Cannabinoid Screen, Urine 06/05/2024 Presumptive Negative  Presumptive Negative Final    CUTOFF LEVEL: 50 NG/ML    Cocaine Metabolite Screen, Urine 06/05/2024 Presumptive Negative  Presumptive  Negative Final    CUTOFF LEVEL: 150 NG/ML    PCP Screen, Urine 06/05/2024 Presumptive Negative  Presumptive Negative Final    CUTOFF LEVEL:  25 NG/ML  Cross-reactivity has been reported with dextromethorphan.    Clonazepam 06/05/2024 <25  <25 ng/mL Final    7-Aminoclonazepam 06/05/2024 <25  <25 ng/mL Final    Alprazolam 06/05/2024 <25  <25 ng/mL Final    Alpha-Hydroxyalprazolam 06/05/2024 <25  <25 ng/mL Final    Midazolam 06/05/2024 <25  <25 ng/mL Final    Alpha-Hydroxymidazolam 06/05/2024 <25  <25 ng/mL Final    Chlordiazepoxide 06/05/2024 <25  <25 ng/mL Final    Diazepam 06/05/2024 <25  <25 ng/mL Final    Nordiazepam 06/05/2024 <25  <25 ng/mL Final    Temazepam 06/05/2024 <25  <25 ng/mL Final    Oxazepam 06/05/2024 <25  <25 ng/mL Final    Lorazepam 06/05/2024 <25  <25 ng/mL Final    Methadone 06/05/2024 <25  <25 ng/mL Final    EDDP 06/05/2024 <25  <25 ng/mL Final    6-Acetylmorphine 06/05/2024 <25  <25 ng/mL Final    Codeine 06/05/2024 <50  <50 ng/mL Final    Hydrocodone 06/05/2024 <25  <25 ng/mL Final    Hydromorphone 06/05/2024 <25  <25 ng/mL Final    Morphine  06/05/2024 <50  <50 ng/mL Final    Norhydrocodone 06/05/2024 <25  <25 ng/mL Final    Noroxycodone 06/05/2024 <25  <25 ng/mL Final    Oxycodone 06/05/2024 <25  <25 ng/mL Final    Oxymorphone 06/05/2024 <25  <25 ng/mL Final    Fentanyl 06/05/2024 <2.5  <2.5 ng/mL Final    Norfentanyl 06/05/2024 <2.5  <2.5 ng/mL Final    Tramadol 06/05/2024 <50  <50 ng/mL Final    O-Desmethyltramadol 06/05/2024 <50  <50 ng/mL Final    Zolpidem 06/05/2024 <25  <25 ng/mL Final    Zolpidem Metabolite (ZCA) 06/05/2024 <25  <25 ng/mL Final       XR knee left 4+ views  Narrative: Interpreted By:  Shanna Rowland,   STUDY:  Left knee 4 views.  Right knee, 3 views.      INDICATION:  Signs/Symptoms:LEFT KNEE INJURY.      COMPARISON:  04/18/2024.      ACCESSION NUMBER(S):  [id number]      ORDERING CLINICIAN:  VALENCIA BILLY      FINDINGS:  Left knee:  No acute fracture or  malalignment.  Severe medial compartment degenerative changes with joint space loss  and osteophytes. Mild lateral and patellofemoral compartment  degenerative changes as well with osteophyte formation. No  significant knee joint effusion. Soft tissues are unremarkable.      Right Knee:  No acute fracture or malalignment.  Changes of total knee arthroplasty without hardware complication.      Impression: 1. Severe medial compartment osteoarthrosis of the left knee.      MACRO:      None      Signed by: Shanna Rowland 7/9/2024 6:22 PM  Dictation workstation:   [id number]       Review of Systems   Constitutional:  Negative for activity change, chills, fatigue and fever.   HENT:  Negative for congestion, rhinorrhea, sinus pressure, sinus pain and sore throat.    Eyes:  Positive for visual disturbance.   Respiratory:  Negative for cough, chest tightness, shortness of breath and wheezing.    Cardiovascular:  Negative for chest pain, palpitations and leg swelling.   Gastrointestinal:  Negative for abdominal distention, abdominal pain, constipation, diarrhea, nausea and vomiting.   Endocrine: Negative.    Genitourinary: Negative.    Musculoskeletal:  Positive for arthralgias and back pain.        Uses walker   Skin: Negative.    Allergic/Immunologic: Negative.    Neurological:  Negative for dizziness, syncope, weakness, light-headedness, numbness and headaches.   Hematological: Negative.    Psychiatric/Behavioral: Negative.     All other systems reviewed and are negative.      Objective   Visit Vitals  /68   Pulse 82   Temp 36.8 °C (98.3 °F)   Wt 70.5 kg (155 lb 8 oz)   SpO2 94%   BMI 33.65 kg/m²   OB Status Postmenopausal   Smoking Status Former   BSA 1.68 m²      Physical Exam  Vitals and nursing note reviewed.   Constitutional:       General: She is not in acute distress.     Appearance: Normal appearance. She is obese. She is not ill-appearing.   HENT:      Head: Normocephalic and atraumatic.      Right Ear:  "Tympanic membrane, ear canal and external ear normal.      Left Ear: Tympanic membrane, ear canal and external ear normal.      Nose: Nose normal.      Mouth/Throat:      Mouth: Mucous membranes are moist.      Pharynx: Oropharynx is clear.   Eyes:      Pupils: Pupils are equal, round, and reactive to light.   Cardiovascular:      Rate and Rhythm: Normal rate and regular rhythm.      Pulses: Normal pulses.      Heart sounds: Normal heart sounds. No murmur heard.  Pulmonary:      Effort: Pulmonary effort is normal. No respiratory distress.      Breath sounds: Normal breath sounds. No wheezing.   Abdominal:      General: Bowel sounds are normal. There is no distension.      Palpations: Abdomen is soft.      Tenderness: There is no abdominal tenderness.   Musculoskeletal:         General: Normal range of motion.      Cervical back: Normal range of motion and neck supple.      Comments: Uses walker   Skin:     General: Skin is warm and dry.      Capillary Refill: Capillary refill takes less than 2 seconds.      Coloration: Skin is not jaundiced.   Neurological:      General: No focal deficit present.      Mental Status: She is alert and oriented to person, place, and time.      Motor: No weakness.   Psychiatric:         Mood and Affect: Mood normal.         Behavior: Behavior normal.         Thought Content: Thought content normal.         Judgment: Judgment normal.         Assessment/Plan   Zhanna \"Ducky\" was seen today for diabetes and med refill.  Diagnoses and all orders for this visit:  Type 2 diabetes mellitus with hyperglycemia, with long-term current use of insulin (Multi)  -     POCT fingerstick glucose manually resulted  -     POCT glycosylated hemoglobin (Hb A1C) manually resulted  -     Discontinue: glimepiride (Amaryl) 2 mg tablet; Take 1 tablet (2 mg) by mouth 2 times a day.  -     glimepiride (Amaryl) 2 mg tablet; Take 1 tablet (2 mg) by mouth 2 times a day.  -     insulin glargine (Lantus Solostar U-100 " "Insulin) 100 unit/mL (3 mL) pen; Inject 10 Units under the skin once daily at bedtime. Take as directed per insulin instructions.  Osteoporosis, unspecified osteoporosis type, unspecified pathological fracture presence  -     alendronate (Fosamax) 70 mg tablet; Take 1 tablet (70 mg) by mouth every 7 days. IN AM W/ 6-8OZ PLAIN WATER. DO NOT EAT/LIE DOWN FOR 30 MIN AFTER  Pulmonary embolism, bilateral (Multi)  -     apixaban (Eliquis) 5 mg tablet; Take 1 tablet (5 mg) by mouth 2 times a day.  Vitamin D deficiency  -     cholecalciferol (Vitamin D-3) 25 MCG (1000 UT) capsule; Take 1 capsule (25 mcg) by mouth once daily.  Depression, unspecified depression type  -     DULoxetine (Cymbalta) 30 mg DR capsule; Take 1 capsule (30 mg) by mouth once daily.  Allergy, subsequent encounter  -     folic acid (Folvite) 1 mg tablet; Take 1 tablet (1 mg) by mouth once daily.  Hypothyroidism, adult  -     levothyroxine (Synthroid, Levoxyl) 25 mcg tablet; Take 1 tablet (25 mcg) by mouth once daily.  Primary hypertension  -     lisinopril 40 mg tablet; Take 1 tablet (40 mg) by mouth once daily.  Vertigo  -     meclizine (Antivert) 25 mg tablet; Take 1 tablet (25 mg) by mouth 3 times a day as needed for dizziness.  Gastroesophageal reflux disease, unspecified whether esophagitis present  -     omeprazole (PriLOSEC) 20 mg DR capsule; Take 1 capsule (20 mg) by mouth once daily in the morning. Take before meals. Do not crush or chew.  Hypercholesterolemia  -     rosuvastatin (Crestor) 20 mg tablet; Take 1 tablet (20 mg) by mouth once daily.  Type 2 diabetes mellitus without complications (Multi)  -     pen needle, diabetic (BD Ultra-Fine Mini Pen Needle) 31 gauge x 3/16\" needle; INJECT 1 EACH UNDER THE SKIN ONCE DAILY. USE AS INSTRUCTED. USE TO INJECT INSULIN  Other orders  -     Follow Up In Primary Care - Established    # Diabetes, not controlled, A1c 8.5 %  -Resume Lantus 10 units at bedtime, decrease glimepiride to 2 mg once " daily  -Check FBS daily, bring record to next visit; check BS as needed for low BS or other concerns  -Low fat/cholesterol, low sodium, carbohydrate controlled diet   # Hypertension, controlled  -Continue lisinopril 40 mg daily  -Low fat/cholesterol, low sodium, low carbohydrate diet  -Exercise as tolerated 150 minutes/week, increase activity slowly  -Weight loss  # Physical deconditioning, improving  -Receiving PT/OT at assisted living  # Bilateral PEs  -Continue Eliquis 5 mg twice a day  -follow with hematology as needed  # Osteoporosis  -continue alendronate 70 mg weekly  -weight bearing exercises  -DEXA due 2024  # Lumbar stenosis, back pain, Neuropathy  -Patient may proceed with injection after holding Eliquis for 3 days  -follow with pain management  # Macular degeneration, legally blind  -follow with opthalmology  # Depression, chronic musculoskeletal pain, controlled  -Continue Cymbalta 30 mg daily  -Continue PT/OT  # Hypothyroidism  -Continue levothyroxine 25 mcg daily  -monitor TSH  # Obesity, BMI 33  -Avoid sweets and sugary drinks  -Drink plenty of water  -Avoid processed foods and refined foods  -Eat fruits, vegetables, lean protein, whole grains  -Increase your activity level  # GERD, improved  -Continue omeprazole 20 mg daily  -avoid alcohol, caffeine, acidic foods, other foods that tend to bother your stomach  -Elevate head of bed 4 to 6 inches on blocks  -Avoid eating 2 to 3 hours prior to bedtime  -Do not lay down after eating  -Do not wear constricting clothing around your middle    Follow-up as needed with me until 10/11/2024 and follow up after with new provider    Patient advised I will be leaving [facility], my last day is October 11, 2024. Discussed options for new provider.

## 2024-08-04 DIAGNOSIS — F32.A DEPRESSION, UNSPECIFIED DEPRESSION TYPE: ICD-10-CM

## 2024-08-05 RX ORDER — DULOXETIN HYDROCHLORIDE 30 MG/1
30 CAPSULE, DELAYED RELEASE ORAL DAILY
Qty: 90 CAPSULE | Refills: 1 | Status: SHIPPED | OUTPATIENT
Start: 2024-08-05

## 2024-08-09 ENCOUNTER — OFFICE VISIT (OUTPATIENT)
Dept: PAIN MEDICINE | Facility: HOSPITAL | Age: 89
End: 2024-08-09
Payer: MEDICARE

## 2024-08-09 VITALS
DIASTOLIC BLOOD PRESSURE: 72 MMHG | BODY MASS INDEX: 32.36 KG/M2 | SYSTOLIC BLOOD PRESSURE: 136 MMHG | WEIGHT: 150 LBS | RESPIRATION RATE: 14 BRPM | OXYGEN SATURATION: 97 % | HEART RATE: 75 BPM | TEMPERATURE: 98.1 F | HEIGHT: 57 IN

## 2024-08-09 DIAGNOSIS — M54.50 CHRONIC BILATERAL LOW BACK PAIN WITHOUT SCIATICA: ICD-10-CM

## 2024-08-09 DIAGNOSIS — G89.29 CHRONIC BILATERAL LOW BACK PAIN WITHOUT SCIATICA: ICD-10-CM

## 2024-08-09 DIAGNOSIS — M47.817 FACET ARTHROPATHY, LUMBOSACRAL: Primary | ICD-10-CM

## 2024-08-09 PROCEDURE — 99213 OFFICE O/P EST LOW 20 MIN: CPT | Performed by: NURSE PRACTITIONER

## 2024-08-09 ASSESSMENT — ENCOUNTER SYMPTOMS
NEUROLOGICAL NEGATIVE: 1
EYES NEGATIVE: 1
CONSTITUTIONAL NEGATIVE: 1
MYALGIAS: 1
JOINT SWELLING: 0
ARTHRALGIAS: 1
RESPIRATORY NEGATIVE: 1
ALLERGIC/IMMUNOLOGIC NEGATIVE: 1
PSYCHIATRIC NEGATIVE: 1
NECK STIFFNESS: 0
ENDOCRINE NEGATIVE: 1
NECK PAIN: 0
CARDIOVASCULAR NEGATIVE: 1
BACK PAIN: 1
GASTROINTESTINAL NEGATIVE: 1

## 2024-08-09 ASSESSMENT — PAIN SCALES - GENERAL: PAINLEVEL: 7

## 2024-08-09 NOTE — PROGRESS NOTES
Last urine drug screening date/ordered today: 6/5/24     Results of last screen: as expected      Last opioid risk screening date/ordered today: 12/4/2024      Pain Scale Screening:   Pain Assessment and Documentation Tool (PADT)   Date of Assessment: 8/9/2024  Analgesia:   Patient reports her pain level on average during the past week is 7on a 0 - 10 scale.   Patient reports that her pain level at its worst during the past week was 10 on a 0 -10 scale.   50% of pain has been relieved during the past week per patient   Patient states that the amount of pain relief she is now obtaining from her current pain reliever(s) is enough to make a real difference in her life.   Query to clinician: Is the patient's pain relief clinically significant? yes  Activities of Daily Living:   Physical functioning: better  Family relationships: unchanged  Social relationships: better  Mood: unchanged  Sleep patterns: better  Overall functioning: better  Adverse Events: No, Zhanna Perez is not experiencing side effects from current pain reliever.  Patients overall severity of side effect:none  Specific Analgesic Plan: Continue present regimen.

## 2024-08-09 NOTE — PATIENT INSTRUCTIONS
Continue with your prescribed medications.  Do not take sedating medications together.    Continue taking tizanidine as needed for muscle pain relief.  Do not take sedating medications together.    ---------------    Your next appointment is with Dr. Way in:    [facility]    For pain block/injection on: 8/23/2024, left L4-L5, L5-S1 diagnostic medial branch block #1    LOCAL    °STOP taking blood thinners Eliquis for 2 days_ before the pain block.      °You will receive a phone call the weekday before your appointment/procedure.   °Please KEEP your scheduled appointments.     °BRING your photo ID, insurance information, and a correct list of your home medications to EVERY visit.    °Please call our office at [phone] if you have any questions.     You will then be seen for a postprocedure follow-up in 2 to 3 weeks    Antibiotic: You will receive Cipro before the procedure due to your history of right total knee replacement    ---------------

## 2024-08-09 NOTE — H&P (VIEW-ONLY)
"History Of Present Illness  Zhanna Perez \"Juan Alberto\" is a pleasant 89 y.o. female who is here for postprocedure follow-up.  Patient presents in a wheelchair.  She is accompanied by her son.  Patient reports she ambulates at home with the use of a walker.  Patient lives at [facility] in [place].      Patient had bilateral L2-L3, L3-L4 RFA on 6/14/2024.  The procedure has improved her back pain.  It provided 90% relief.  She is still feeling better.  The procedure has improved her pain, sleep, ability to participate in her daily activities, the need for pain medication, ability to participate in physical therapy and ability to enjoy social activities.    Patient continues to have lower back pain.  She rates her pain a 6-7 out of 10.  Pain can be constant or comes and goes depending on her activities.  She describes it as aching.  Pain is across her back.  It is aggravated with prolonged standing or walking.  She denies pain, numbness or tingling sensation to her legs.  She denies leg weakness or change in balance.  She denies bowel or bladder incontinence.  She denies recent falls, injuries, or ER visits.  There has been no changes since she was last seen.    Patient continues to take duloxetine prescribed by her PCP.  She takes tizanidine as needed.  She takes Tylenol for pain relief.  She denies side effects to her medications.  She is doing physical therapy at the [facility].     I reviewed previous notes and imaging.  I discussed the plan of care including pharmacologic and joint interventional procedure.  Patient had right L4-L5, L5-S1 RFA on 12/11/2023.  She reports pain to her lower back is on both sides.  I discussed diagnostic MBB to the left side.  She is in agreement to proceed to this procedure.  This is done under fluoroscopy.  If the 2 series of the left-sided MBB's are successful then the plan is to do a bilateral L4-L5, L5-S1 RFA.  They voiced understanding.  Questions were " answered during this encounter.    HEMAL was done today where she scored 35.  This form was scanned and included in this note.    -------------  PROCEDURES:    6/14/2024: Bilateral L2-L3, L3-L4 RFA  5/14/2024: Bilateral L2-L3, L3-L4 diagnostic MBB #2  4/2/2024: Bilateral L2-L3, L3-L4 diagnostic MBB #1  1/30/2024: Bilateral SI joint injection  12/11/2023: Right L4-L5, L5-S1 RFA  8/25/2023: Right SI joint injection  7/18/2023: Left L5 and S1 TFESI  12/7/2022: Left L3-L4, right L4-L5 TFESI with Dr. Banks  9/16/2022: Right L4-L5, L5-S1 RFA with Dr. Banks  ---------------    OARRS:  Rosaura Ferro, PANDA-CNP, DNP on 8/9/2024  1:14 PM  I have personally reviewed the OARRS report for Zhanna Perez. I have considered the risks of abuse, dependence, addiction and diversion    Past Medical History  She has a past medical history of Age-related cognitive decline (03/31/2015), Body mass index (BMI) 32.0-32.9, adult (10/28/2020), Bursitis of right shoulder (08/21/2018), Encounter for general adult medical examination without abnormal findings (04/01/2016), Encounter for other preprocedural examination (08/30/2016), Erythematous condition, unspecified (05/09/2018), Mastitis without abscess (05/09/2018), Mastodynia (05/09/2018), Mastodynia (05/09/2018), Obesity, unspecified (08/02/2021), Obesity, unspecified (10/28/2020), Obesity, unspecified (09/15/2021), Obesity, unspecified (05/03/2021), Occipital neuralgia (05/12/2016), Other conditions influencing health status (01/28/2020), Other conditions influencing health status (04/07/2017), Pain in right shoulder (07/05/2018), Pain in unspecified hip (12/29/2014), Personal history of other diseases of the female genital tract (05/09/2018), Personal history of other diseases of the musculoskeletal system and connective tissue, Personal history of other diseases of the nervous system and sense organs (08/23/2017), Personal history of other diseases of the nervous system and sense organs  (03/27/2014), Personal history of other diseases of the respiratory system (06/27/2016), Personal history of other diseases of the respiratory system (06/19/2020), Personal history of other infectious and parasitic diseases (02/25/2016), Trigger finger, right middle finger (03/19/2020), Trochanteric bursitis, left hip (06/03/2019), 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    Surgical History  Past Surgical History:   Procedure Laterality Date    BREAST SURGERY  03/02/2015    Breast Surgery    EYE SURGERY  03/02/2015    Eye Surgery    JOINT REPLACEMENT Right     knee replacement    KNEE ARTHROSCOPY W/ DEBRIDEMENT  04/30/2013    Knee Arthroscopy (Therapeutic)    MR NECK ANGIO WO IV CONTRAST  02/26/2016    MR NECK ANGIO WO IV CONTRAST 2/26/2016 [facility]    OTHER SURGICAL HISTORY  04/30/2013    Supracervical Hysterectomy Laparoscopic Uterus 250g Or Less    TONSILLECTOMY  04/30/2013    Tonsillectomy        Social History  She reports that she quit smoking about 73 years ago. Her smoking use included cigarettes. She has been exposed to tobacco smoke. She has never used smokeless tobacco. She reports that she does not drink alcohol and does not use drugs.    Family History  Family History   Problem Relation Name Age of Onset    Brain cancer Father      Coronary artery disease Brother          Allergies  Codeine, Darvocet a500 [propoxyphene n-acetaminophen], Dilaudid [hydromorphone], Gabapentin, and Propoxyphene-acetaminophen    Medications    Current Outpatient Medications:     acetaminophen (TYLENOL ORAL), Take by mouth., Disp: , Rfl:     alendronate (Fosamax) 70 mg tablet, Take 1 tablet (70 mg) by mouth every 7 days. IN AM W/ 6-8OZ PLAIN WATER. DO NOT EAT/LIE DOWN FOR 30 MIN AFTER, Disp: 12 tablet, Rfl: 1    apixaban (Eliquis) 5 mg tablet, Take 1 tablet (5 mg)  by mouth 2 times a day., Disp: 180 tablet, Rfl: 1    blood pressure monitor kit, Use to check BP daily and as needed, Disp: 1 kit, Rfl: 0    blood sugar diagnostic (Premier Test Strip) strip, TEST BLOOD SUGARonce daily. E11.9, Disp: 100 each, Rfl: 1    cholecalciferol (Vitamin D-3) 25 MCG (1000 UT) capsule, Take 1 capsule (25 mcg) by mouth once daily., Disp: 90 capsule, Rfl: 1    DULoxetine (Cymbalta) 30 mg DR capsule, TAKE 1 CAPSULE BY MOUTH ONCE DAILY., Disp: 90 capsule, Rfl: 1    fluticasone (Flonase) 50 mcg/actuation nasal spray, Administer 2 sprays into each nostril once daily. Shake gently. Before first use, prime pump. After use, clean tip and replace cap., Disp: 48 mL, Rfl: 1    folic acid (Folvite) 1 mg tablet, Take 1 tablet (1 mg) by mouth once daily., Disp: 90 tablet, Rfl: 1    FreeStyle glucose monitoring kit, 1 each if needed., Disp: , Rfl:     glimepiride (Amaryl) 2 mg tablet, Take 1 tablet (2 mg) by mouth 2 times a day., Disp: 180 tablet, Rfl: 1    insulin glargine (Lantus Solostar U-100 Insulin) 100 unit/mL (3 mL) pen, Inject 30 Units under the skin once daily at bedtime. Take as directed per insulin instructions., Disp: 30 mL, Rfl: 1    levothyroxine (Synthroid, Levoxyl) 25 mcg tablet, Take 1 tablet (25 mcg) by mouth once daily., Disp: 90 tablet, Rfl: 1    lisinopril 40 mg tablet, Take 1 tablet (40 mg) by mouth once daily., Disp: 90 tablet, Rfl: 1    loperamide (Imodium) 2 mg capsule, Take 1 capsule (2 mg) by mouth 4 times a day as needed for diarrhea., Disp: , Rfl:     magnesium hydroxide (Milk of Magnesia) 400 mg/5 mL suspension, Take 30 mL by mouth once daily as needed for constipation., Disp: , Rfl:     meclizine (Antivert) 25 mg tablet, Take 1 tablet (25 mg) by mouth 3 times a day as needed for dizziness., Disp: 30 tablet, Rfl: 1    omeprazole (PriLOSEC) 20 mg DR capsule, Take 1 capsule (20 mg) by mouth once daily in the morning. Take before meals. Do not crush or chew., Disp: 90 capsule, Rfl:  "1    ondansetron (Zofran) 4 mg tablet, Take 1 tablet (4 mg) by mouth every 8 hours if needed for nausea or vomiting., Disp: , Rfl:     pen needle, diabetic (BD Ultra-Fine Mini Pen Needle) 31 gauge x 3/16\" needle, INJECT 1 EACH UNDER THE SKIN ONCE DAILY. USE AS INSTRUCTED. USE TO INJECT INSULIN, Disp: 100 each, Rfl: 1    rosuvastatin (Crestor) 20 mg tablet, Take 1 tablet (20 mg) by mouth once daily., Disp: 90 tablet, Rfl: 1    tiZANidine (Zanaflex) 2 mg tablet, TAKE 1 TABLET (2 MG) BY MOUTH 2 TIMES A DAY AS NEEDED FOR MUSCLE SPASMS., Disp: 60 tablet, Rfl: 0    vit C/E/Zn/coppr/lutein/zeaxan (PRESERVISION AREDS-2 ORAL), Take 1 capsule by mouth 2 times a day., Disp: , Rfl:      Review of Systems   Constitutional: Negative.    HENT: Negative.     Eyes: Negative.    Respiratory: Negative.     Cardiovascular: Negative.    Gastrointestinal: Negative.    Endocrine: Negative.    Genitourinary: Negative.    Musculoskeletal:  Positive for arthralgias, back pain and myalgias. Negative for gait problem, joint swelling, neck pain and neck stiffness.   Skin: Negative.    Allergic/Immunologic: Negative.    Neurological: Negative.    Psychiatric/Behavioral: Negative.          Physical Exam  Vitals and nursing note reviewed.   HENT:      Head: Normocephalic.      Nose: Nose normal.   Eyes:      Extraocular Movements: Extraocular movements intact.      Conjunctiva/sclera: Conjunctivae normal.      Pupils: Pupils are equal, round, and reactive to light.   Cardiovascular:      Rate and Rhythm: Normal rate and regular rhythm.   Pulmonary:      Effort: Pulmonary effort is normal.      Breath sounds: Normal breath sounds.   Musculoskeletal:         General: Tenderness present. No swelling, deformity or signs of injury.      Cervical back: No rigidity or tenderness.      Lumbar back: Tenderness present.      Right lower leg: No edema.      Left lower leg: No edema.      Comments: Negative leg raise.  Negative for paraspinal tenderness at " L2-L3, L3-L4 bilaterally.  Positive for paraspinal tenderness bilaterally at L4-L5, L5-S1 with slight rotation.  With the left worse than the right.  No radicular symptoms.  BUE 4/5, BLE 4/5.   Skin:     General: Skin is warm and dry.   Neurological:      General: No focal deficit present.      Mental Status: She is alert and oriented to person, place, and time.   Psychiatric:         Mood and Affect: Mood normal.         Behavior: Behavior normal.          Last Recorded Vitals  /72   Pulse 75   Temp 36.7 °C (98.1 °F) (Temporal)   Resp 14   Wt 68 kg (150 lb)   SpO2 97%       Pain Management Panel  More data exists         Latest Ref Rng & Units 6/5/2024 3/5/2024   Pain Management Panel   Amphetamine Screen, Urine Presumptive Negative Presumptive Negative  Presumptive Negative    Barbiturate Screen, Urine Presumptive Negative Presumptive Negative  Presumptive Negative    Benzodiazepines Screen, Urine Presumptive Negative - Presumptive Negative    Codeine IgE <50 ng/mL <50  -   Fentanyl Screen, Urine Presumptive Negative - Presumptive Negative    Hydromorphone Urine <25 ng/mL <25  -   Methadone Screen, Urine Presumptive Negative - Presumptive Negative    Morphine  <50 ng/mL <50  -      Details                      Relevant Results    Narrative & Impression   MRN: [id number]  Patient Name: PADMINI WATT     STUDY:  MRI L-SPINE WO;  7/12/2022 4:18 pm     INDICATION:  Right sided lower back pain. Lumbosacral spondylosis.     COMPARISON:  Lumbar spine radiograph 06/09/2022     ACCESSION NUMBER(S):  [id number]     ORDERING CLINICIAN:  ZACARIAS CROCKER     TECHNIQUE:  Sagittal T1, T2, STIR, axial T1 and T2 weighted images of the lumbar  spine were acquired.     FINDINGS:  Alignment: Mild straightening of the lumbar lordosis. Moderate  levoscoliosis centered at L3-L4.  Grade 1 anterolisthesis of L2 on L3  and L3 on L4.     Vertebrae/Intervertebral Discs: Vertebral body heights are  maintained.The marrow signal is within  normal limits. Multilevel loss  of disc spaces most prominent at L2-L3 and L3-L4.     Conus: The lower thoracic cord appears unremarkable. The conus  terminates at L1-L2. Redundancy of the nerve roots at L3-L4 secondary  to severe canal narrowing..     Multilevel degenerative changes of the lumbar spine including  endplate remodeling, facet degenerative changes, disc bulges and  ligamentum flavum hypertrophy.     T12-L1:  Small disc bulge and facet degenerative changes without  canal stenosis. Neural foramina are patent.     L1-2:  Disc bulge slightly asymmetric to the left, facet degenerative  changes and mild ligamentum flavum hypertrophy with flattening of the  ventral thecal sac. No significant canal stenosis. Neural foramina  are patent.     L2-3:  Symmetric disc bulge, ligamentum flavum hypertrophy and facet  degenerative changes with moderate canal stenosis. Right subarticular  recess narrowing. Right greater than left moderate neural foraminal  narrowing with abutment of the exiting right L2 nerve root.     L3-4:  Disc bulge asymmetric to the right, marked facet degenerative  changes and ligamentum flavum hypertrophy with severe canal stenosis.  Bilateral subarticular recess narrowing with abutment of the  traversing L4 nerve roots. Severe bilateral neural foraminal  narrowing with mild mass effect on the exiting L3 nerve roots.     L4-5:  Disc bulge, facet degenerative changes and ligamentum flavum  hypertrophy with severe canal stenosis. Bilateral subarticular recess  narrowing. Left-greater-than-right severe neural foraminal narrowing  with mass effect on the exiting left L4 nerve root.     L5-S1:  Disc bulge, moderate facet degenerative change and ligamentum  flavum hypertrophy without canal stenosis. Moderate bilateral neural  foraminal narrowing, greater on the left with abutment of the exiting  L5 nerve roots bilaterally.     The prevertebral and posterior paraspinous soft tissues  are  unremarkable.  1.3 cm left interpolar region simple cyst.     IMPRESSION:  1.  Advanced multilevel degenerative changes of the lumbar spine with  up to severe canal stenosis and neural foraminal narrowing most  prominent at L3-L4 and L4-L5.  2. Varying degrees of multilevel abutment and mass effect on the  exiting nerve roots as described above.  3. Redundancy of the nerve roots at L3-L4 secondary to severe canal  narrowing and disc bulge.  4. Multilevel subarticular recess narrowing, most prominent at L3-L4  and L4-L5.   -----------         Media Information               Assessment/Plan   Problem List Items Addressed This Visit             ICD-10-CM    Chronic bilateral low back pain without sciatica M54.50, G89.29    Relevant Orders    [place] pain management     Medial Nerve Branch Block    Facet arthropathy, lumbosacral - Primary M47.817    Relevant Orders    [place] pain management     Medial Nerve Branch Block              1. Facet arthropathy, lumbosacral  - [place] pain management; Future  -  Medial Nerve Branch Block; Future    2. Chronic bilateral low back pain without sciatica  - [place] pain management; Future  -  Medial Nerve Branch Block; Future       Plan/Follow-up Instructions:   Continue with your prescribed medications.  Do not take sedating medications together.    Continue taking tizanidine as needed for muscle pain relief.  Do not take sedating medications together.    ---------------    Your next appointment is with Dr. Way in:    [facility]    For pain block/injection on: 8/23/2024, left L4-L5, L5-S1 diagnostic medial branch block #1    LOCAL    °STOP taking blood thinners Eliquis for 2 days_ before the pain block.      °You will receive a phone call the weekday before your appointment/procedure.   °Please KEEP your scheduled appointments.     °BRING your photo ID, insurance information, and a correct list of your home medications to EVERY visit.    °Please call our office at [phone]  if you have any questions.     You will then be seen for a postprocedure follow-up in 2 to 3 weeks    Antibiotic: You will receive Cipro before the procedure due to your history of right total knee replacement    ---------------        Time     Prep time on date of the patient encounter: 2  Time spent directly with patient/family/caregiver: 25   Documentation time: 2  Total time on date of patient encounter: 29      PANDA Javier-CNP, LIZBETH Ferro DNP, APRN, VEGAP-C    [facility]  Office #: [phone]  Fax # [phone]    ---------------------  Disclaimer: This note was created using voice recognition software. It was not corrected for typographical or grammatical errors, inadvertent word insertion, or any unintended errors. Please feel free to contact me for clarification.  -----------------

## 2024-08-09 NOTE — PROGRESS NOTES
"History Of Present Illness  Zhanna Perez \"Juan Alberto\" is a pleasant 89 y.o. female who is here for postprocedure follow-up.  Patient presents in a wheelchair.  She is accompanied by her son.  Patient reports she ambulates at home with the use of a walker.  Patient lives at [facility] in [place].      Patient had bilateral L2-L3, L3-L4 RFA on 6/14/2024.  The procedure has improved her back pain.  It provided 90% relief.  She is still feeling better.  The procedure has improved her pain, sleep, ability to participate in her daily activities, the need for pain medication, ability to participate in physical therapy and ability to enjoy social activities.    Patient continues to have lower back pain.  She rates her pain a 6-7 out of 10.  Pain can be constant or comes and goes depending on her activities.  She describes it as aching.  Pain is across her back.  It is aggravated with prolonged standing or walking.  She denies pain, numbness or tingling sensation to her legs.  She denies leg weakness or change in balance.  She denies bowel or bladder incontinence.  She denies recent falls, injuries, or ER visits.  There has been no changes since she was last seen.    Patient continues to take duloxetine prescribed by her PCP.  She takes tizanidine as needed.  She takes Tylenol for pain relief.  She denies side effects to her medications.  She is doing physical therapy at the [facility].     I reviewed previous notes and imaging.  I discussed the plan of care including pharmacologic and joint interventional procedure.  Patient had right L4-L5, L5-S1 RFA on 12/11/2023.  She reports pain to her lower back is on both sides.  I discussed diagnostic MBB to the left side.  She is in agreement to proceed to this procedure.  This is done under fluoroscopy.  If the 2 series of the left-sided MBB's are successful then the plan is to do a bilateral L4-L5, L5-S1 RFA.  They voiced understanding.  Questions were " answered during this encounter.    HEMAL was done today where she scored 35.  This form was scanned and included in this note.    -------------  PROCEDURES:    6/14/2024: Bilateral L2-L3, L3-L4 RFA  5/14/2024: Bilateral L2-L3, L3-L4 diagnostic MBB #2  4/2/2024: Bilateral L2-L3, L3-L4 diagnostic MBB #1  1/30/2024: Bilateral SI joint injection  12/11/2023: Right L4-L5, L5-S1 RFA  8/25/2023: Right SI joint injection  7/18/2023: Left L5 and S1 TFESI  12/7/2022: Left L3-L4, right L4-L5 TFESI with Dr. Banks  9/16/2022: Right L4-L5, L5-S1 RFA with Dr. Banks  ---------------    OARRS:  Rosaura Ferro, PANDA-CNP, DNP on 8/9/2024  1:14 PM  I have personally reviewed the OARRS report for Zhanna Perez. I have considered the risks of abuse, dependence, addiction and diversion    Past Medical History  She has a past medical history of Age-related cognitive decline (03/31/2015), Body mass index (BMI) 32.0-32.9, adult (10/28/2020), Bursitis of right shoulder (08/21/2018), Encounter for general adult medical examination without abnormal findings (04/01/2016), Encounter for other preprocedural examination (08/30/2016), Erythematous condition, unspecified (05/09/2018), Mastitis without abscess (05/09/2018), Mastodynia (05/09/2018), Mastodynia (05/09/2018), Obesity, unspecified (08/02/2021), Obesity, unspecified (10/28/2020), Obesity, unspecified (09/15/2021), Obesity, unspecified (05/03/2021), Occipital neuralgia (05/12/2016), Other conditions influencing health status (01/28/2020), Other conditions influencing health status (04/07/2017), Pain in right shoulder (07/05/2018), Pain in unspecified hip (12/29/2014), Personal history of other diseases of the female genital tract (05/09/2018), Personal history of other diseases of the musculoskeletal system and connective tissue, Personal history of other diseases of the nervous system and sense organs (08/23/2017), Personal history of other diseases of the nervous system and sense organs  (03/27/2014), Personal history of other diseases of the respiratory system (06/27/2016), Personal history of other diseases of the respiratory system (06/19/2020), Personal history of other infectious and parasitic diseases (02/25/2016), Trigger finger, right middle finger (03/19/2020), Trochanteric bursitis, left hip (06/03/2019), 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    Surgical History  Past Surgical History:   Procedure Laterality Date    BREAST SURGERY  03/02/2015    Breast Surgery    EYE SURGERY  03/02/2015    Eye Surgery    JOINT REPLACEMENT Right     knee replacement    KNEE ARTHROSCOPY W/ DEBRIDEMENT  04/30/2013    Knee Arthroscopy (Therapeutic)    MR NECK ANGIO WO IV CONTRAST  02/26/2016    MR NECK ANGIO WO IV CONTRAST 2/26/2016 [facility]    OTHER SURGICAL HISTORY  04/30/2013    Supracervical Hysterectomy Laparoscopic Uterus 250g Or Less    TONSILLECTOMY  04/30/2013    Tonsillectomy        Social History  She reports that she quit smoking about 73 years ago. Her smoking use included cigarettes. She has been exposed to tobacco smoke. She has never used smokeless tobacco. She reports that she does not drink alcohol and does not use drugs.    Family History  Family History   Problem Relation Name Age of Onset    Brain cancer Father      Coronary artery disease Brother          Allergies  Codeine, Darvocet a500 [propoxyphene n-acetaminophen], Dilaudid [hydromorphone], Gabapentin, and Propoxyphene-acetaminophen    Medications    Current Outpatient Medications:     acetaminophen (TYLENOL ORAL), Take by mouth., Disp: , Rfl:     alendronate (Fosamax) 70 mg tablet, Take 1 tablet (70 mg) by mouth every 7 days. IN AM W/ 6-8OZ PLAIN WATER. DO NOT EAT/LIE DOWN FOR 30 MIN AFTER, Disp: 12 tablet, Rfl: 1    apixaban (Eliquis) 5 mg tablet, Take 1 tablet (5 mg)  by mouth 2 times a day., Disp: 180 tablet, Rfl: 1    blood pressure monitor kit, Use to check BP daily and as needed, Disp: 1 kit, Rfl: 0    blood sugar diagnostic (Premier Test Strip) strip, TEST BLOOD SUGARonce daily. E11.9, Disp: 100 each, Rfl: 1    cholecalciferol (Vitamin D-3) 25 MCG (1000 UT) capsule, Take 1 capsule (25 mcg) by mouth once daily., Disp: 90 capsule, Rfl: 1    DULoxetine (Cymbalta) 30 mg DR capsule, TAKE 1 CAPSULE BY MOUTH ONCE DAILY., Disp: 90 capsule, Rfl: 1    fluticasone (Flonase) 50 mcg/actuation nasal spray, Administer 2 sprays into each nostril once daily. Shake gently. Before first use, prime pump. After use, clean tip and replace cap., Disp: 48 mL, Rfl: 1    folic acid (Folvite) 1 mg tablet, Take 1 tablet (1 mg) by mouth once daily., Disp: 90 tablet, Rfl: 1    FreeStyle glucose monitoring kit, 1 each if needed., Disp: , Rfl:     glimepiride (Amaryl) 2 mg tablet, Take 1 tablet (2 mg) by mouth 2 times a day., Disp: 180 tablet, Rfl: 1    insulin glargine (Lantus Solostar U-100 Insulin) 100 unit/mL (3 mL) pen, Inject 30 Units under the skin once daily at bedtime. Take as directed per insulin instructions., Disp: 30 mL, Rfl: 1    levothyroxine (Synthroid, Levoxyl) 25 mcg tablet, Take 1 tablet (25 mcg) by mouth once daily., Disp: 90 tablet, Rfl: 1    lisinopril 40 mg tablet, Take 1 tablet (40 mg) by mouth once daily., Disp: 90 tablet, Rfl: 1    loperamide (Imodium) 2 mg capsule, Take 1 capsule (2 mg) by mouth 4 times a day as needed for diarrhea., Disp: , Rfl:     magnesium hydroxide (Milk of Magnesia) 400 mg/5 mL suspension, Take 30 mL by mouth once daily as needed for constipation., Disp: , Rfl:     meclizine (Antivert) 25 mg tablet, Take 1 tablet (25 mg) by mouth 3 times a day as needed for dizziness., Disp: 30 tablet, Rfl: 1    omeprazole (PriLOSEC) 20 mg DR capsule, Take 1 capsule (20 mg) by mouth once daily in the morning. Take before meals. Do not crush or chew., Disp: 90 capsule, Rfl:  "1    ondansetron (Zofran) 4 mg tablet, Take 1 tablet (4 mg) by mouth every 8 hours if needed for nausea or vomiting., Disp: , Rfl:     pen needle, diabetic (BD Ultra-Fine Mini Pen Needle) 31 gauge x 3/16\" needle, INJECT 1 EACH UNDER THE SKIN ONCE DAILY. USE AS INSTRUCTED. USE TO INJECT INSULIN, Disp: 100 each, Rfl: 1    rosuvastatin (Crestor) 20 mg tablet, Take 1 tablet (20 mg) by mouth once daily., Disp: 90 tablet, Rfl: 1    tiZANidine (Zanaflex) 2 mg tablet, TAKE 1 TABLET (2 MG) BY MOUTH 2 TIMES A DAY AS NEEDED FOR MUSCLE SPASMS., Disp: 60 tablet, Rfl: 0    vit C/E/Zn/coppr/lutein/zeaxan (PRESERVISION AREDS-2 ORAL), Take 1 capsule by mouth 2 times a day., Disp: , Rfl:      Review of Systems   Constitutional: Negative.    HENT: Negative.     Eyes: Negative.    Respiratory: Negative.     Cardiovascular: Negative.    Gastrointestinal: Negative.    Endocrine: Negative.    Genitourinary: Negative.    Musculoskeletal:  Positive for arthralgias, back pain and myalgias. Negative for gait problem, joint swelling, neck pain and neck stiffness.   Skin: Negative.    Allergic/Immunologic: Negative.    Neurological: Negative.    Psychiatric/Behavioral: Negative.          Physical Exam  Vitals and nursing note reviewed.   HENT:      Head: Normocephalic.      Nose: Nose normal.   Eyes:      Extraocular Movements: Extraocular movements intact.      Conjunctiva/sclera: Conjunctivae normal.      Pupils: Pupils are equal, round, and reactive to light.   Cardiovascular:      Rate and Rhythm: Normal rate and regular rhythm.   Pulmonary:      Effort: Pulmonary effort is normal.      Breath sounds: Normal breath sounds.   Musculoskeletal:         General: Tenderness present. No swelling, deformity or signs of injury.      Cervical back: No rigidity or tenderness.      Lumbar back: Tenderness present.      Right lower leg: No edema.      Left lower leg: No edema.      Comments: Negative leg raise.  Negative for paraspinal tenderness at " L2-L3, L3-L4 bilaterally.  Positive for paraspinal tenderness bilaterally at L4-L5, L5-S1 with slight rotation.  With the left worse than the right.  No radicular symptoms.  BUE 4/5, BLE 4/5.   Skin:     General: Skin is warm and dry.   Neurological:      General: No focal deficit present.      Mental Status: She is alert and oriented to person, place, and time.   Psychiatric:         Mood and Affect: Mood normal.         Behavior: Behavior normal.          Last Recorded Vitals  /72   Pulse 75   Temp 36.7 °C (98.1 °F) (Temporal)   Resp 14   Wt 68 kg (150 lb)   SpO2 97%       Pain Management Panel  More data exists         Latest Ref Rng & Units 6/5/2024 3/5/2024   Pain Management Panel   Amphetamine Screen, Urine Presumptive Negative Presumptive Negative  Presumptive Negative    Barbiturate Screen, Urine Presumptive Negative Presumptive Negative  Presumptive Negative    Benzodiazepines Screen, Urine Presumptive Negative - Presumptive Negative    Codeine IgE <50 ng/mL <50  -   Fentanyl Screen, Urine Presumptive Negative - Presumptive Negative    Hydromorphone Urine <25 ng/mL <25  -   Methadone Screen, Urine Presumptive Negative - Presumptive Negative    Morphine  <50 ng/mL <50  -      Details                      Relevant Results    Narrative & Impression   MRN: [id number]  Patient Name: PADMINI WATT     STUDY:  MRI L-SPINE WO;  7/12/2022 4:18 pm     INDICATION:  Right sided lower back pain. Lumbosacral spondylosis.     COMPARISON:  Lumbar spine radiograph 06/09/2022     ACCESSION NUMBER(S):  [id number]     ORDERING CLINICIAN:  ZACARIAS CROCKER     TECHNIQUE:  Sagittal T1, T2, STIR, axial T1 and T2 weighted images of the lumbar  spine were acquired.     FINDINGS:  Alignment: Mild straightening of the lumbar lordosis. Moderate  levoscoliosis centered at L3-L4.  Grade 1 anterolisthesis of L2 on L3  and L3 on L4.     Vertebrae/Intervertebral Discs: Vertebral body heights are  maintained.The marrow signal is within  normal limits. Multilevel loss  of disc spaces most prominent at L2-L3 and L3-L4.     Conus: The lower thoracic cord appears unremarkable. The conus  terminates at L1-L2. Redundancy of the nerve roots at L3-L4 secondary  to severe canal narrowing..     Multilevel degenerative changes of the lumbar spine including  endplate remodeling, facet degenerative changes, disc bulges and  ligamentum flavum hypertrophy.     T12-L1:  Small disc bulge and facet degenerative changes without  canal stenosis. Neural foramina are patent.     L1-2:  Disc bulge slightly asymmetric to the left, facet degenerative  changes and mild ligamentum flavum hypertrophy with flattening of the  ventral thecal sac. No significant canal stenosis. Neural foramina  are patent.     L2-3:  Symmetric disc bulge, ligamentum flavum hypertrophy and facet  degenerative changes with moderate canal stenosis. Right subarticular  recess narrowing. Right greater than left moderate neural foraminal  narrowing with abutment of the exiting right L2 nerve root.     L3-4:  Disc bulge asymmetric to the right, marked facet degenerative  changes and ligamentum flavum hypertrophy with severe canal stenosis.  Bilateral subarticular recess narrowing with abutment of the  traversing L4 nerve roots. Severe bilateral neural foraminal  narrowing with mild mass effect on the exiting L3 nerve roots.     L4-5:  Disc bulge, facet degenerative changes and ligamentum flavum  hypertrophy with severe canal stenosis. Bilateral subarticular recess  narrowing. Left-greater-than-right severe neural foraminal narrowing  with mass effect on the exiting left L4 nerve root.     L5-S1:  Disc bulge, moderate facet degenerative change and ligamentum  flavum hypertrophy without canal stenosis. Moderate bilateral neural  foraminal narrowing, greater on the left with abutment of the exiting  L5 nerve roots bilaterally.     The prevertebral and posterior paraspinous soft tissues  are  unremarkable.  1.3 cm left interpolar region simple cyst.     IMPRESSION:  1.  Advanced multilevel degenerative changes of the lumbar spine with  up to severe canal stenosis and neural foraminal narrowing most  prominent at L3-L4 and L4-L5.  2. Varying degrees of multilevel abutment and mass effect on the  exiting nerve roots as described above.  3. Redundancy of the nerve roots at L3-L4 secondary to severe canal  narrowing and disc bulge.  4. Multilevel subarticular recess narrowing, most prominent at L3-L4  and L4-L5.   -----------         Media Information               Assessment/Plan   Problem List Items Addressed This Visit             ICD-10-CM    Chronic bilateral low back pain without sciatica M54.50, G89.29    Relevant Orders    [place] pain management     Medial Nerve Branch Block    Facet arthropathy, lumbosacral - Primary M47.817    Relevant Orders    [place] pain management     Medial Nerve Branch Block              1. Facet arthropathy, lumbosacral  - [place] pain management; Future  -  Medial Nerve Branch Block; Future    2. Chronic bilateral low back pain without sciatica  - [place] pain management; Future  -  Medial Nerve Branch Block; Future       Plan/Follow-up Instructions:   Continue with your prescribed medications.  Do not take sedating medications together.    Continue taking tizanidine as needed for muscle pain relief.  Do not take sedating medications together.    ---------------    Your next appointment is with Dr. Way in:    [facility]    For pain block/injection on: 8/23/2024, left L4-L5, L5-S1 diagnostic medial branch block #1    LOCAL    °STOP taking blood thinners Eliquis for 2 days_ before the pain block.      °You will receive a phone call the weekday before your appointment/procedure.   °Please KEEP your scheduled appointments.     °BRING your photo ID, insurance information, and a correct list of your home medications to EVERY visit.    °Please call our office at [phone]  if you have any questions.     You will then be seen for a postprocedure follow-up in 2 to 3 weeks    Antibiotic: You will receive Cipro before the procedure due to your history of right total knee replacement    ---------------        Time     Prep time on date of the patient encounter: 2  Time spent directly with patient/family/caregiver: 25   Documentation time: 2  Total time on date of patient encounter: 29      PANDA Javier-CNP, LIZBETH Ferro DNP, APRN, VEGAP-C    [facility]  Office #: [phone]  Fax # [phone]    ---------------------  Disclaimer: This note was created using voice recognition software. It was not corrected for typographical or grammatical errors, inadvertent word insertion, or any unintended errors. Please feel free to contact me for clarification.  -----------------

## 2024-08-12 ENCOUNTER — APPOINTMENT (OUTPATIENT)
Dept: PRIMARY CARE | Facility: CLINIC | Age: 89
End: 2024-08-12
Payer: MEDICARE

## 2024-08-12 VITALS
WEIGHT: 155.5 LBS | OXYGEN SATURATION: 94 % | HEART RATE: 82 BPM | BODY MASS INDEX: 33.65 KG/M2 | DIASTOLIC BLOOD PRESSURE: 68 MMHG | TEMPERATURE: 98.3 F | SYSTOLIC BLOOD PRESSURE: 138 MMHG

## 2024-08-12 DIAGNOSIS — F32.A DEPRESSION, UNSPECIFIED DEPRESSION TYPE: ICD-10-CM

## 2024-08-12 DIAGNOSIS — Z79.4 TYPE 2 DIABETES MELLITUS WITH HYPERGLYCEMIA, WITH LONG-TERM CURRENT USE OF INSULIN (MULTI): ICD-10-CM

## 2024-08-12 DIAGNOSIS — M81.0 OSTEOPOROSIS, UNSPECIFIED OSTEOPOROSIS TYPE, UNSPECIFIED PATHOLOGICAL FRACTURE PRESENCE: ICD-10-CM

## 2024-08-12 DIAGNOSIS — E03.9 HYPOTHYROIDISM, ADULT: ICD-10-CM

## 2024-08-12 DIAGNOSIS — K21.9 GASTROESOPHAGEAL REFLUX DISEASE, UNSPECIFIED WHETHER ESOPHAGITIS PRESENT: ICD-10-CM

## 2024-08-12 DIAGNOSIS — E78.00 HYPERCHOLESTEROLEMIA: ICD-10-CM

## 2024-08-12 DIAGNOSIS — E11.9 TYPE 2 DIABETES MELLITUS WITHOUT COMPLICATIONS (MULTI): ICD-10-CM

## 2024-08-12 DIAGNOSIS — T78.40XD ALLERGY, SUBSEQUENT ENCOUNTER: ICD-10-CM

## 2024-08-12 DIAGNOSIS — E11.65 TYPE 2 DIABETES MELLITUS WITH HYPERGLYCEMIA, WITH LONG-TERM CURRENT USE OF INSULIN (MULTI): ICD-10-CM

## 2024-08-12 DIAGNOSIS — E55.9 VITAMIN D DEFICIENCY: ICD-10-CM

## 2024-08-12 DIAGNOSIS — R42 VERTIGO: ICD-10-CM

## 2024-08-12 DIAGNOSIS — I26.99 PULMONARY EMBOLISM, BILATERAL (MULTI): ICD-10-CM

## 2024-08-12 DIAGNOSIS — I10 PRIMARY HYPERTENSION: ICD-10-CM

## 2024-08-12 LAB
POC FINGERSTICK BLOOD GLUCOSE: 261 MG/DL (ref 70–100)
POC HEMOGLOBIN A1C: 8.5 % (ref 4.2–6.5)

## 2024-08-12 PROCEDURE — 1159F MED LIST DOCD IN RCRD: CPT | Performed by: NURSE PRACTITIONER

## 2024-08-12 PROCEDURE — 82962 GLUCOSE BLOOD TEST: CPT | Performed by: NURSE PRACTITIONER

## 2024-08-12 PROCEDURE — 1123F ACP DISCUSS/DSCN MKR DOCD: CPT | Performed by: NURSE PRACTITIONER

## 2024-08-12 PROCEDURE — 83036 HEMOGLOBIN GLYCOSYLATED A1C: CPT | Performed by: NURSE PRACTITIONER

## 2024-08-12 PROCEDURE — 99214 OFFICE O/P EST MOD 30 MIN: CPT | Performed by: NURSE PRACTITIONER

## 2024-08-12 PROCEDURE — 3078F DIAST BP <80 MM HG: CPT | Performed by: NURSE PRACTITIONER

## 2024-08-12 PROCEDURE — 1036F TOBACCO NON-USER: CPT | Performed by: NURSE PRACTITIONER

## 2024-08-12 PROCEDURE — 3075F SYST BP GE 130 - 139MM HG: CPT | Performed by: NURSE PRACTITIONER

## 2024-08-12 PROCEDURE — 1160F RVW MEDS BY RX/DR IN RCRD: CPT | Performed by: NURSE PRACTITIONER

## 2024-08-12 RX ORDER — FOLIC ACID 1 MG/1
1 TABLET ORAL DAILY
Qty: 90 TABLET | Refills: 1 | Status: SHIPPED | OUTPATIENT
Start: 2024-08-12

## 2024-08-12 RX ORDER — ALENDRONATE SODIUM 70 MG/1
70 TABLET ORAL
Qty: 12 TABLET | Refills: 1 | Status: SHIPPED | OUTPATIENT
Start: 2024-08-12

## 2024-08-12 RX ORDER — GLIMEPIRIDE 2 MG/1
2 TABLET ORAL 2 TIMES DAILY
Qty: 90 TABLET | Refills: 1 | Status: SHIPPED | OUTPATIENT
Start: 2024-08-12 | End: 2024-08-12 | Stop reason: SDUPTHER

## 2024-08-12 RX ORDER — LISINOPRIL 40 MG/1
40 TABLET ORAL DAILY
Qty: 90 TABLET | Refills: 1 | Status: SHIPPED | OUTPATIENT
Start: 2024-08-12

## 2024-08-12 RX ORDER — LEVOTHYROXINE SODIUM 25 UG/1
25 TABLET ORAL DAILY
Qty: 90 TABLET | Refills: 1 | Status: SHIPPED | OUTPATIENT
Start: 2024-08-12

## 2024-08-12 RX ORDER — OMEPRAZOLE 20 MG/1
20 CAPSULE, DELAYED RELEASE ORAL
Qty: 90 CAPSULE | Refills: 1 | Status: SHIPPED | OUTPATIENT
Start: 2024-08-12

## 2024-08-12 RX ORDER — ROSUVASTATIN CALCIUM 20 MG/1
20 TABLET, COATED ORAL DAILY
Qty: 90 TABLET | Refills: 1 | Status: SHIPPED | OUTPATIENT
Start: 2024-08-12

## 2024-08-12 RX ORDER — GLIMEPIRIDE 2 MG/1
2 TABLET ORAL
Qty: 90 TABLET | Refills: 1 | Status: SHIPPED | OUTPATIENT
Start: 2024-08-12

## 2024-08-12 RX ORDER — DULOXETIN HYDROCHLORIDE 30 MG/1
30 CAPSULE, DELAYED RELEASE ORAL DAILY
Qty: 90 CAPSULE | Refills: 1 | Status: SHIPPED | OUTPATIENT
Start: 2024-08-12

## 2024-08-12 RX ORDER — PEN NEEDLE, DIABETIC 30 GX3/16"
NEEDLE, DISPOSABLE MISCELLANEOUS
Qty: 100 EACH | Refills: 1 | Status: SHIPPED | OUTPATIENT
Start: 2024-08-12

## 2024-08-12 RX ORDER — MECLIZINE HYDROCHLORIDE 25 MG/1
25 TABLET ORAL 3 TIMES DAILY PRN
Qty: 30 TABLET | Refills: 1 | Status: SHIPPED | OUTPATIENT
Start: 2024-08-12

## 2024-08-12 RX ORDER — INSULIN GLARGINE 100 [IU]/ML
10 INJECTION, SOLUTION SUBCUTANEOUS NIGHTLY
Qty: 15 ML | Refills: 1 | Status: SHIPPED | OUTPATIENT
Start: 2024-08-12

## 2024-08-12 RX ORDER — GLIMEPIRIDE 2 MG/1
2 TABLET ORAL 2 TIMES DAILY
Qty: 180 TABLET | Refills: 1 | Status: SHIPPED | OUTPATIENT
Start: 2024-08-12 | End: 2024-08-12

## 2024-08-12 RX ORDER — VIT C/E/ZN/COPPR/LUTEIN/ZEAXAN 250MG-90MG
25 CAPSULE ORAL DAILY
Qty: 90 CAPSULE | Refills: 1 | Status: SHIPPED | OUTPATIENT
Start: 2024-08-12

## 2024-08-12 ASSESSMENT — ENCOUNTER SYMPTOMS
VOMITING: 0
SORE THROAT: 0
CHILLS: 0
NAUSEA: 0
FATIGUE: 0
COUGH: 0
ARTHRALGIAS: 1
BACK PAIN: 1
WEAKNESS: 0
FEVER: 0
NUMBNESS: 0
ABDOMINAL PAIN: 0
HEADACHES: 0

## 2024-08-21 ENCOUNTER — TELEPHONE (OUTPATIENT)
Dept: PRIMARY CARE | Facility: CLINIC | Age: 89
End: 2024-08-21
Payer: MEDICARE

## 2024-08-21 NOTE — TELEPHONE ENCOUNTER
Patient is having a procedure done with Pain management and they need something stating she can hold her eloquis 3 days prior to procedure. [phone] fax.

## 2024-08-23 ENCOUNTER — HOSPITAL ENCOUNTER (OUTPATIENT)
Dept: PAIN MEDICINE | Facility: HOSPITAL | Age: 89
Discharge: HOME | End: 2024-08-23
Payer: MEDICARE

## 2024-08-23 VITALS
WEIGHT: 150 LBS | BODY MASS INDEX: 33.74 KG/M2 | TEMPERATURE: 97 F | SYSTOLIC BLOOD PRESSURE: 157 MMHG | HEART RATE: 71 BPM | HEIGHT: 56 IN | RESPIRATION RATE: 16 BRPM | DIASTOLIC BLOOD PRESSURE: 86 MMHG | OXYGEN SATURATION: 99 %

## 2024-08-23 DIAGNOSIS — G89.29 CHRONIC BILATERAL LOW BACK PAIN WITHOUT SCIATICA: ICD-10-CM

## 2024-08-23 DIAGNOSIS — M47.817 FACET ARTHROPATHY, LUMBOSACRAL: ICD-10-CM

## 2024-08-23 DIAGNOSIS — M54.50 CHRONIC BILATERAL LOW BACK PAIN WITHOUT SCIATICA: ICD-10-CM

## 2024-08-23 LAB — GLUCOSE BLD MANUAL STRIP-MCNC: 216 MG/DL (ref 74–99)

## 2024-08-23 PROCEDURE — 64493 INJ PARAVERT F JNT L/S 1 LEV: CPT | Mod: 50 | Performed by: ANESTHESIOLOGY

## 2024-08-23 PROCEDURE — 99152 MOD SED SAME PHYS/QHP 5/>YRS: CPT | Performed by: ANESTHESIOLOGY

## 2024-08-23 PROCEDURE — 99153 MOD SED SAME PHYS/QHP EA: CPT | Performed by: ANESTHESIOLOGY

## 2024-08-23 PROCEDURE — 64493 INJ PARAVERT F JNT L/S 1 LEV: CPT | Performed by: ANESTHESIOLOGY

## 2024-08-23 PROCEDURE — 64494 INJ PARAVERT F JNT L/S 2 LEV: CPT | Mod: 50 | Performed by: ANESTHESIOLOGY

## 2024-08-23 PROCEDURE — 2500000005 HC RX 250 GENERAL PHARMACY W/O HCPCS: Performed by: ANESTHESIOLOGY

## 2024-08-23 PROCEDURE — 2500000004 HC RX 250 GENERAL PHARMACY W/ HCPCS (ALT 636 FOR OP/ED): Performed by: ANESTHESIOLOGY

## 2024-08-23 PROCEDURE — 64494 INJ PARAVERT F JNT L/S 2 LEV: CPT | Performed by: ANESTHESIOLOGY

## 2024-08-23 PROCEDURE — 82947 ASSAY GLUCOSE BLOOD QUANT: CPT

## 2024-08-23 PROCEDURE — 2500000001 HC RX 250 WO HCPCS SELF ADMINISTERED DRUGS (ALT 637 FOR MEDICARE OP)

## 2024-08-23 RX ORDER — BUPIVACAINE HYDROCHLORIDE 2.5 MG/ML
INJECTION, SOLUTION EPIDURAL; INFILTRATION; INTRACAUDAL AS NEEDED
Status: COMPLETED | OUTPATIENT
Start: 2024-08-23 | End: 2024-08-23

## 2024-08-23 RX ORDER — CIPROFLOXACIN 500 MG/1
500 TABLET ORAL ONCE
Status: DISCONTINUED | OUTPATIENT
Start: 2024-08-23 | End: 2024-08-24 | Stop reason: HOSPADM

## 2024-08-23 RX ORDER — TRIAMCINOLONE ACETONIDE 40 MG/ML
INJECTION, SUSPENSION INTRA-ARTICULAR; INTRAMUSCULAR AS NEEDED
Status: COMPLETED | OUTPATIENT
Start: 2024-08-23 | End: 2024-08-23

## 2024-08-23 RX ORDER — LIDOCAINE HYDROCHLORIDE 10 MG/ML
INJECTION, SOLUTION EPIDURAL; INFILTRATION; INTRACAUDAL; PERINEURAL AS NEEDED
Status: COMPLETED | OUTPATIENT
Start: 2024-08-23 | End: 2024-08-23

## 2024-08-23 RX ORDER — SODIUM CHLORIDE, SODIUM LACTATE, POTASSIUM CHLORIDE, CALCIUM CHLORIDE 600; 310; 30; 20 MG/100ML; MG/100ML; MG/100ML; MG/100ML
100 INJECTION, SOLUTION INTRAVENOUS CONTINUOUS
Status: DISCONTINUED | OUTPATIENT
Start: 2024-08-23 | End: 2024-08-24 | Stop reason: HOSPADM

## 2024-08-23 RX ORDER — CIPROFLOXACIN 500 MG/1
TABLET ORAL
Status: COMPLETED
Start: 2024-08-23 | End: 2024-08-23

## 2024-08-23 ASSESSMENT — PATIENT HEALTH QUESTIONNAIRE - PHQ9
SUM OF ALL RESPONSES TO PHQ9 QUESTIONS 1 AND 2: 0
2. FEELING DOWN, DEPRESSED OR HOPELESS: NOT AT ALL
1. LITTLE INTEREST OR PLEASURE IN DOING THINGS: NOT AT ALL

## 2024-08-23 ASSESSMENT — ENCOUNTER SYMPTOMS
DEPRESSION: 0
OCCASIONAL FEELINGS OF UNSTEADINESS: 1
LOSS OF SENSATION IN FEET: 0

## 2024-08-23 ASSESSMENT — PAIN - FUNCTIONAL ASSESSMENT
PAIN_FUNCTIONAL_ASSESSMENT: 0-10
PAIN_FUNCTIONAL_ASSESSMENT: 0-10

## 2024-08-23 ASSESSMENT — PAIN SCALES - GENERAL
PAINLEVEL_OUTOF10: 0 - NO PAIN
PAINLEVEL_OUTOF10: 9

## 2024-08-23 NOTE — DISCHARGE INSTRUCTIONS
No heavy lifting or strenuous activity today.    Keep bandage on for 24 hours.  Keep injection site clean and dry, it may be sore for up to 48 hours.  For relief of pain and swelling, you may apply ice to the injection site area.  If pain persist you may apply moist heat.  Rare side effects include infection, bleeding, nerve damage. If you have fever, redness or swelling near site, severe pain, please go to the nearest emergency room. For other questions please call office at [phone]. Local anesthetics wear off in several hours and duration of relief vary from person to person.    Follow up 8/29 @ 11:00 am in [place]

## 2024-08-23 NOTE — Clinical Note
Site was marked and prepped. Prepped with: ChloraPrep and Betadine. After dry time, patient was then draped.

## 2024-08-29 ENCOUNTER — APPOINTMENT (OUTPATIENT)
Dept: PAIN MEDICINE | Facility: HOSPITAL | Age: 89
End: 2024-08-29
Payer: MEDICARE

## 2024-08-29 PROCEDURE — 82570 ASSAY OF URINE CREATININE: CPT | Mod: 59 | Performed by: NURSE PRACTITIONER

## 2024-08-30 ENCOUNTER — OFFICE VISIT (OUTPATIENT)
Dept: PAIN MEDICINE | Facility: HOSPITAL | Age: 89
End: 2024-08-30
Payer: MEDICARE

## 2024-08-30 VITALS
HEART RATE: 98 BPM | SYSTOLIC BLOOD PRESSURE: 149 MMHG | OXYGEN SATURATION: 96 % | HEIGHT: 57 IN | RESPIRATION RATE: 16 BRPM | DIASTOLIC BLOOD PRESSURE: 68 MMHG | TEMPERATURE: 98.8 F | WEIGHT: 150 LBS | BODY MASS INDEX: 32.36 KG/M2

## 2024-08-30 DIAGNOSIS — R39.9 URINARY SYMPTOM OR SIGN: ICD-10-CM

## 2024-08-30 DIAGNOSIS — Z79.899 MEDICATION MANAGEMENT: ICD-10-CM

## 2024-08-30 DIAGNOSIS — M47.817 FACET ARTHROPATHY, LUMBOSACRAL: Primary | ICD-10-CM

## 2024-08-30 DIAGNOSIS — M54.50 CHRONIC BILATERAL LOW BACK PAIN WITHOUT SCIATICA: ICD-10-CM

## 2024-08-30 DIAGNOSIS — G89.29 CHRONIC BILATERAL LOW BACK PAIN WITHOUT SCIATICA: ICD-10-CM

## 2024-08-30 LAB
AMPHETAMINES UR QL SCN: NORMAL
BARBITURATES UR QL SCN: NORMAL
BZE UR QL SCN: NORMAL
CANNABINOIDS UR QL SCN: NORMAL
CREAT UR-MCNC: 78.5 MG/DL (ref 20–320)
PCP UR QL SCN: NORMAL

## 2024-08-30 PROCEDURE — 99213 OFFICE O/P EST LOW 20 MIN: CPT | Performed by: NURSE PRACTITIONER

## 2024-08-30 ASSESSMENT — ENCOUNTER SYMPTOMS
ALLERGIC/IMMUNOLOGIC NEGATIVE: 1
NECK STIFFNESS: 0
CARDIOVASCULAR NEGATIVE: 1
MYALGIAS: 1
PSYCHIATRIC NEGATIVE: 1
CONSTITUTIONAL NEGATIVE: 1
EYES NEGATIVE: 1
BACK PAIN: 1
NEUROLOGICAL NEGATIVE: 1
GASTROINTESTINAL NEGATIVE: 1
RESPIRATORY NEGATIVE: 1
NECK PAIN: 0
JOINT SWELLING: 0
ENDOCRINE NEGATIVE: 1
ARTHRALGIAS: 1

## 2024-08-30 ASSESSMENT — PAIN SCALES - GENERAL: PAINLEVEL: 5

## 2024-08-30 NOTE — PROGRESS NOTES
Last urine drug screening date/ordered today: 08/30/24  Results of last screen: Tox consistent on 6/5/2024 UDS      Opioid Risk Screening:   THE OPIOID RISK TOOL (ORT)                                                                      Female                     Male     1. Family History of Substance Abuse:                              Alcohol                                                   [1]=     0                      [3]  =     Illicit Drugs                                             [2] =       0                   [3]   =    Prescription Drugs                                [4]=            0               [4]   =    2. Personal History of Substance Abuse:     Alcohol                                                    [3] =     0                     [3] =     Illegal Drugs                                           [4] =     0                      [4]  =     Prescription Drugs                                [5]=           0                 [5]   =    3. Age (If between 16 to 45):               [1]=                           [1]   =    4. History of Preadolescent Sexual Abuse                                                                  [3]=     0                       [0]   =    5. Psychological Disease:    ADD, OCD, Bipolar, Schizophrenia    [2]=        0                    [2]   =    Depression                                          [1]=       0                      [1]   =      TOTAL Score =  0   /  Last opioid risk screening date/ordered today: 08/30/2024  Patient's total score is 0    Reference :  Low Score = 0 to 3  Moderate Score = 4 to 7  High Score = =8       Pain Scale Screening:   Pain Assessment and Documentation Tool (PADT)   Date of Assessment: 08/30/2024  Analgesia:   Patient reports her pain level on average during the past week is 5on a 0 - 10 scale.   Patient reports that her pain level at its worst during the past week was 5 on a 0 -10 scale.   50% of pain has been relieved  during the past week per patient   Patient states that the amount of pain relief she is now obtaining from her current pain reliever(s) is enough to make a real difference in her life.   Query to clinician: Is the patient's pain relief clinically significant? yes  Activities of Daily Living:   Physical functioning: better  Family relationships: unchanged  Social relationships: unchanged  Mood: unchanged  Sleep patterns: unchanged  Overall functioning: better  Adverse Events: No, Zhanna Perez is not experiencing side effects from current pain reliever.  Patients overall severity of side effect:none  Specific Analgesic Plan: Continue present regimen.

## 2024-08-30 NOTE — PATIENT INSTRUCTIONS
Continue taking Tylenol as needed for pain relief.    ---------------    Your next appointment is with Dr. Way in:    [facility]    For pain block/injection on: 9/24/2024, left L4-L5, L5-S1 diagnostic medial branch block #2    LOCAL    Stop taking Eliquis for 2 days     °You will receive a phone call the weekday before your appointment/procedure.   °Please KEEP your scheduled appointments.     °BRING your photo ID, insurance information, and a correct list of your home medications to EVERY visit.    °Please call our office at [phone] if you have any questions.     You will then be seen for a postprocedure follow-up in 2 to 3 weeks    Antibiotic: You will receive Cipro before the procedure due to your history of right total knee replacement    ---------------

## 2024-08-30 NOTE — PROGRESS NOTES
"History Of Present Illness  Zhanna Perez \"Juan Alberto\" is a pleasant 89 y.o. female who is here for postprocedure follow-up.  Patient presents in her wheelchair.   She arrives with her son.     Patient had left L4-L5, L5-S1 diagnostic MBB #1 on 8/23/2024.  The injection has improved her back pain.  She reports that it provided 80 to 90% relief that lasted for several days.  The injection has improved her pain, ability to participate in her daily activities, ability to participate in physical therapy and ability to enjoy social activities.  Pain level before the procedure was 6-7 out of 10.  Pain level postprocedure for the first 3 days was 1 out of 10.    Patient continues to have chronic lower back pain.  She rates her pain as 5-6 out of 10.  Pain can be constant or comes and goes depending on her activities.  She describes her pain as aching.  She denies pain, numbness or tingling sensation to her legs.  She denies leg weakness or change in balance.  She denies bowel or bladder incontinence.  She denies recent falls, injuries, or ER visits.  There has been no changes since she was last seen.    Patient continues to take duloxetine prescribed by her PCP. She takes tizanidine as needed. She takes Tylenol for pain relief. She denies side effects to her medications. She is doing physical therapy at the assisted living.     I reviewed previous notes and imaging.  I discussed the plan of care including pharmacologic and joint interventional procedure.  She is in agreement to proceed to the second series of the medial branch block.  We will schedule her for left L4-L5, L5-S1 MBB #2.  This is done under fluoroscopy.  If the second series is successful the plan is to do bilateral L4-L5, L5-S1 RFA.  She had right L4-L5, L5-S1 RFA on 12/18/2023.  Questions were answered during this encounter.    The patient was counseled regarding diagnostic results, instructions for management, risk factor reductions, risks and benefits of " treatment options and importance of compliance with treatment.    ------------  PROCEDURES:    8/23/2024: Left L4-L5, L5-S1 diagnostic MBB #1  6/14/2024: Bilateral L2-L3, L3-L4 RFA  5/14/2024: Bilateral L2-L3, L3-L4 diagnostic MBB #2  4/2/2024: Bilateral L2-L3, L3-L4 diagnostic MBB #1  1/30/2024: Bilateral SI joint injection  12/11/2023: Right L4-L5, L5-S1 RFA  8/25/2023: Right SI joint injection  7/18/2023: Left L5 and S1 TFESI  12/7/2022: Left L3-L4, right L4-L5 TFESI with Dr. Banks  9/16/2022: Right L4-L5, L5-S1 RFA with Dr. Banks  ---------------    OARRS:  Rosaura Ferro, APRN-CNP, DNP on 8/30/2024  9:28 AM  I have personally reviewed the OARRS report for Zhanna Perez. I have considered the risks of abuse, dependence, addiction and diversion    Past Medical History  She has a past medical history of Age-related cognitive decline (03/31/2015), Body mass index (BMI) 32.0-32.9, adult (10/28/2020), Bursitis of right shoulder (08/21/2018), Encounter for general adult medical examination without abnormal findings (04/01/2016), Encounter for other preprocedural examination (08/30/2016), Erythematous condition, unspecified (05/09/2018), Hyperlipidemia, Mastitis without abscess (05/09/2018), Mastodynia (05/09/2018), Mastodynia (05/09/2018), Obesity, unspecified (08/02/2021), Obesity, unspecified (10/28/2020), Obesity, unspecified (09/15/2021), Obesity, unspecified (05/03/2021), Occipital neuralgia (05/12/2016), Other conditions influencing health status (01/28/2020), Other conditions influencing health status (04/07/2017), Pain in right shoulder (07/05/2018), Pain in unspecified hip (12/29/2014), Personal history of other diseases of the female genital tract (05/09/2018), Personal history of other diseases of the musculoskeletal system and connective tissue, Personal history of other diseases of the nervous system and sense organs (08/23/2017), Personal history of other diseases of the nervous system and sense organs  (03/27/2014), Personal history of other diseases of the respiratory system (06/27/2016), Personal history of other diseases of the respiratory system (06/19/2020), Personal history of other infectious and parasitic diseases (02/25/2016), Trigger finger, right middle finger (03/19/2020), Trochanteric bursitis, left hip (06/03/2019), 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    Surgical History  Past Surgical History:   Procedure Laterality Date    BREAST SURGERY  03/02/2015    Breast Surgery    EYE SURGERY  03/02/2015    Eye Surgery    JOINT REPLACEMENT Right     knee replacement    KNEE ARTHROSCOPY W/ DEBRIDEMENT  04/30/2013    Knee Arthroscopy (Therapeutic)    MR NECK ANGIO WO IV CONTRAST  02/26/2016    MR NECK ANGIO WO IV CONTRAST 2/26/2016 [facility]    OTHER SURGICAL HISTORY  04/30/2013    Supracervical Hysterectomy Laparoscopic Uterus 250g Or Less    TONSILLECTOMY  04/30/2013    Tonsillectomy        Social History  She reports that she quit smoking about 73 years ago. Her smoking use included cigarettes. She has been exposed to tobacco smoke. She has never used smokeless tobacco. She reports that she does not drink alcohol and does not use drugs.    Family History  Family History   Problem Relation Name Age of Onset    Brain cancer Father      Coronary artery disease Brother          Allergies  Codeine, Darvocet a500 [propoxyphene n-acetaminophen], Dilaudid [hydromorphone], Gabapentin, and Propoxyphene-acetaminophen    Medications    Current Outpatient Medications:     acetaminophen (TYLENOL ORAL), Take by mouth., Disp: , Rfl:     alendronate (Fosamax) 70 mg tablet, Take 1 tablet (70 mg) by mouth every 7 days. IN AM W/ 6-8OZ PLAIN WATER. DO NOT EAT/LIE DOWN FOR 30 MIN AFTER, Disp: 12 tablet, Rfl: 1    apixaban (Eliquis) 5 mg tablet, Take 1 tablet (5 mg)  "by mouth 2 times a day., Disp: 180 tablet, Rfl: 1    blood pressure monitor kit, Use to check BP daily and as needed, Disp: 1 kit, Rfl: 0    blood sugar diagnostic (Premier Test Strip) strip, TEST BLOOD SUGARonce daily. E11.9, Disp: 100 each, Rfl: 1    cholecalciferol (Vitamin D-3) 25 MCG (1000 UT) capsule, Take 1 capsule (25 mcg) by mouth once daily., Disp: 90 capsule, Rfl: 1    DULoxetine (Cymbalta) 30 mg DR capsule, Take 1 capsule (30 mg) by mouth once daily., Disp: 90 capsule, Rfl: 1    fluticasone (Flonase) 50 mcg/actuation nasal spray, Administer 2 sprays into each nostril once daily. Shake gently. Before first use, prime pump. After use, clean tip and replace cap., Disp: 48 mL, Rfl: 1    folic acid (Folvite) 1 mg tablet, Take 1 tablet (1 mg) by mouth once daily., Disp: 90 tablet, Rfl: 1    FreeStyle glucose monitoring kit, 1 each if needed., Disp: , Rfl:     glimepiride (Amaryl) 2 mg tablet, Take 1 tablet (2 mg) by mouth once daily in the morning. Take before meals., Disp: 90 tablet, Rfl: 1    insulin glargine (Lantus Solostar U-100 Insulin) 100 unit/mL (3 mL) pen, Inject 10 Units under the skin once daily at bedtime. Take as directed per insulin instructions., Disp: 15 mL, Rfl: 1    levothyroxine (Synthroid, Levoxyl) 25 mcg tablet, Take 1 tablet (25 mcg) by mouth once daily., Disp: 90 tablet, Rfl: 1    lisinopril 40 mg tablet, Take 1 tablet (40 mg) by mouth once daily., Disp: 90 tablet, Rfl: 1    loperamide (Imodium) 2 mg capsule, Take 1 capsule (2 mg) by mouth 4 times a day as needed for diarrhea., Disp: , Rfl:     magnesium hydroxide (Milk of Magnesia) 400 mg/5 mL suspension, Take 30 mL by mouth once daily as needed for constipation., Disp: , Rfl:     ondansetron (Zofran) 4 mg tablet, Take 1 tablet (4 mg) by mouth every 8 hours if needed for nausea or vomiting., Disp: , Rfl:     pen needle, diabetic (BD Ultra-Fine Mini Pen Needle) 31 gauge x 3/16\" needle, INJECT 1 EACH UNDER THE SKIN ONCE DAILY. USE AS " INSTRUCTED. USE TO INJECT INSULIN, Disp: 100 each, Rfl: 1    vit C/E/Zn/coppr/lutein/zeaxan (PRESERVISION AREDS-2 ORAL), Take 1 capsule by mouth 2 times a day., Disp: , Rfl:     meclizine (Antivert) 25 mg tablet, Take 1 tablet (25 mg) by mouth 3 times a day as needed for dizziness. (Patient not taking: Reported on 8/30/2024), Disp: 30 tablet, Rfl: 1    omeprazole (PriLOSEC) 20 mg DR capsule, Take 1 capsule (20 mg) by mouth once daily in the morning. Take before meals. Do not crush or chew. (Patient not taking: Reported on 8/30/2024), Disp: 90 capsule, Rfl: 1    rosuvastatin (Crestor) 20 mg tablet, Take 1 tablet (20 mg) by mouth once daily., Disp: 90 tablet, Rfl: 1    tiZANidine (Zanaflex) 2 mg tablet, TAKE 1 TABLET (2 MG) BY MOUTH 2 TIMES A DAY AS NEEDED FOR MUSCLE SPASMS. (Patient not taking: Reported on 8/30/2024), Disp: 60 tablet, Rfl: 0     Review of Systems   Constitutional: Negative.    HENT: Negative.     Eyes: Negative.    Respiratory: Negative.     Cardiovascular: Negative.    Gastrointestinal: Negative.    Endocrine: Negative.    Genitourinary: Negative.    Musculoskeletal:  Positive for arthralgias, back pain and myalgias. Negative for gait problem, joint swelling, neck pain and neck stiffness.   Skin: Negative.    Allergic/Immunologic: Negative.    Neurological: Negative.    Psychiatric/Behavioral: Negative.          Physical Exam  Vitals and nursing note reviewed.   HENT:      Head: Normocephalic.      Nose: Nose normal.   Eyes:      Extraocular Movements: Extraocular movements intact.      Conjunctiva/sclera: Conjunctivae normal.      Pupils: Pupils are equal, round, and reactive to light.   Cardiovascular:      Rate and Rhythm: Normal rate and regular rhythm.   Pulmonary:      Effort: Pulmonary effort is normal.      Breath sounds: Normal breath sounds.   Musculoskeletal:         General: Tenderness present. No swelling, deformity or signs of injury.      Cervical back: No rigidity or tenderness.       Lumbar back: Tenderness present.      Right lower leg: No edema.      Left lower leg: No edema.      Comments: Negative leg raise.  Positive for paraspinal tenderness at the lumbar region bilaterally at L4-L5, L5-S1 with rotation.  No radicular symptoms.  BUE 4/5, BLE 4/5.   Skin:     General: Skin is warm and dry.   Neurological:      General: No focal deficit present.      Mental Status: She is alert and oriented to person, place, and time.   Psychiatric:         Mood and Affect: Mood normal.         Behavior: Behavior normal.          Last Recorded Vitals  /68 (BP Location: Left arm, Patient Position: Sitting, BP Cuff Size: Adult)   Pulse 98   Temp 37.1 °C (98.8 °F) (Tympanic)   Resp 16   Wt 68 kg (150 lb)   SpO2 96%  Body mass index is 32.46 kg/m².       Pain Management Panel  More data exists         Latest Ref Rng & Units 6/5/2024 3/5/2024   Pain Management Panel   Amphetamine Screen, Urine Presumptive Negative Presumptive Negative  Presumptive Negative    Barbiturate Screen, Urine Presumptive Negative Presumptive Negative  Presumptive Negative    Benzodiazepines Screen, Urine Presumptive Negative - Presumptive Negative    Codeine IgE <50 ng/mL <50  -   Fentanyl Screen, Urine Presumptive Negative - Presumptive Negative    Hydromorphone Urine <25 ng/mL <25  -   Methadone Screen, Urine Presumptive Negative - Presumptive Negative    Morphine  <50 ng/mL <50  -      Details                      Relevant Results    Narrative & Impression   MRN: [id number]  Patient Name: PADMINI WATT     STUDY:  MRI L-SPINE WO;  7/12/2022 4:18 pm     INDICATION:  Right sided lower back pain. Lumbosacral spondylosis.     COMPARISON:  Lumbar spine radiograph 06/09/2022     ACCESSION NUMBER(S):  [id number]     ORDERING CLINICIAN:  ZACARIAS CROCKER     TECHNIQUE:  Sagittal T1, T2, STIR, axial T1 and T2 weighted images of the lumbar  spine were acquired.     FINDINGS:  Alignment: Mild straightening of the lumbar lordosis.  Moderate  levoscoliosis centered at L3-L4.  Grade 1 anterolisthesis of L2 on L3  and L3 on L4.     Vertebrae/Intervertebral Discs: Vertebral body heights are  maintained.The marrow signal is within normal limits. Multilevel loss  of disc spaces most prominent at L2-L3 and L3-L4.     Conus: The lower thoracic cord appears unremarkable. The conus  terminates at L1-L2. Redundancy of the nerve roots at L3-L4 secondary  to severe canal narrowing..     Multilevel degenerative changes of the lumbar spine including  endplate remodeling, facet degenerative changes, disc bulges and  ligamentum flavum hypertrophy.     T12-L1:  Small disc bulge and facet degenerative changes without  canal stenosis. Neural foramina are patent.     L1-2:  Disc bulge slightly asymmetric to the left, facet degenerative  changes and mild ligamentum flavum hypertrophy with flattening of the  ventral thecal sac. No significant canal stenosis. Neural foramina  are patent.     L2-3:  Symmetric disc bulge, ligamentum flavum hypertrophy and facet  degenerative changes with moderate canal stenosis. Right subarticular  recess narrowing. Right greater than left moderate neural foraminal  narrowing with abutment of the exiting right L2 nerve root.     L3-4:  Disc bulge asymmetric to the right, marked facet degenerative  changes and ligamentum flavum hypertrophy with severe canal stenosis.  Bilateral subarticular recess narrowing with abutment of the  traversing L4 nerve roots. Severe bilateral neural foraminal  narrowing with mild mass effect on the exiting L3 nerve roots.     L4-5:  Disc bulge, facet degenerative changes and ligamentum flavum  hypertrophy with severe canal stenosis. Bilateral subarticular recess  narrowing. Left-greater-than-right severe neural foraminal narrowing  with mass effect on the exiting left L4 nerve root.     L5-S1:  Disc bulge, moderate facet degenerative change and ligamentum  flavum hypertrophy without canal stenosis. Moderate  bilateral neural  foraminal narrowing, greater on the left with abutment of the exiting  L5 nerve roots bilaterally.     The prevertebral and posterior paraspinous soft tissues are  unremarkable.  1.3 cm left interpolar region simple cyst.     IMPRESSION:  1.  Advanced multilevel degenerative changes of the lumbar spine with  up to severe canal stenosis and neural foraminal narrowing most  prominent at L3-L4 and L4-L5.  2. Varying degrees of multilevel abutment and mass effect on the  exiting nerve roots as described above.  3. Redundancy of the nerve roots at L3-L4 secondary to severe canal  narrowing and disc bulge.  4. Multilevel subarticular recess narrowing, most prominent at L3-L4  and L4-L5.   -------------------       Media Information           Assessment/Plan   Diagnoses and all orders for this visit:  Facet arthropathy, lumbosacral  -     FL pain management; Future  -      Medial Nerve Branch Block; Future  Chronic bilateral low back pain without sciatica  -     FL pain management; Future  -      Medial Nerve Branch Block; Future  Medication management  -     Opiate/Opioid/Benzo Prescription Compliance; Future  -     OOB Internal Tracking  Urinary symptom or sign (error. Was released by mistake)  -     Urinalysis with Reflex Culture and Microscopic           1. Facet arthropathy, lumbosacral  - [place] pain management; Future  -  Medial Nerve Branch Block; Future    2. Chronic bilateral low back pain without sciatica  - [place] pain management; Future  -  Medial Nerve Branch Block; Future    3. Medication management  - Opiate/Opioid/Benzo Prescription Compliance; Future  - Opiate/Opioid/Benzo Prescription Compliance  - OOB Internal Tracking       Plan/Follow-up Instructions:   Continue taking Tylenol as needed for pain relief.    ---------------    Your next appointment is with Dr. Way in:    [facility]    For pain block/injection on: 9/24/2024, left L4-L5, L5-S1 diagnostic medial branch block  #2    LOCAL    Stop taking Eliquis for 2 days     °You will receive a phone call the weekday before your appointment/procedure.   °Please KEEP your scheduled appointments.     °BRING your photo ID, insurance information, and a correct list of your home medications to EVERY visit.    °Please call our office at [phone] if you have any questions.     You will then be seen for a postprocedure follow-up in 2 to 3 weeks    Antibiotic: You will receive Cipro before the procedure due to your history of right total knee replacement    ---------------        Time     Prep time on date of the patient encounter: 2  Time spent directly with patient/family/caregiver: 25   Documentation time: 2  Total time on date of patient encounter: 29      Rosaura Ferro DNP, APRN, FNP-C    [facility]  Office #: [phone]  Fax # [phone]    ---------------------  Disclaimer: This note was created using voice recognition software. It was not corrected for typographical or grammatical errors, inadvertent word insertion, or any unintended errors. Please feel free to contact me for clarification.  -----------------

## 2024-09-09 ENCOUNTER — TELEPHONE (OUTPATIENT)
Dept: ORTHOPEDIC SURGERY | Facility: CLINIC | Age: 89
End: 2024-09-09
Payer: MEDICARE

## 2024-09-09 DIAGNOSIS — M17.12 ARTHRITIS OF KNEE, LEFT: Primary | ICD-10-CM

## 2024-09-16 RX ORDER — CIPROFLOXACIN 500 MG/1
500 TABLET ORAL ONCE
OUTPATIENT
Start: 2024-09-24

## 2024-09-23 ENCOUNTER — TELEPHONE (OUTPATIENT)
Dept: PAIN MEDICINE | Facility: HOSPITAL | Age: 89
End: 2024-09-23
Payer: MEDICARE

## 2024-09-23 NOTE — TELEPHONE ENCOUNTER
Pt nurse did not stop Blood thinner for 09/24/24 procedure. Pt was rescheduled for 10/01/2024 and Jes, her nurse was notified of new date and time as well as instructed to stop pt blood thinner 2 days prior.     Chantal Romo MA

## 2024-09-24 ENCOUNTER — APPOINTMENT (OUTPATIENT)
Dept: PAIN MEDICINE | Facility: HOSPITAL | Age: 89
End: 2024-09-24
Payer: MEDICARE

## 2024-09-30 ENCOUNTER — APPOINTMENT (OUTPATIENT)
Dept: ORTHOPEDIC SURGERY | Facility: CLINIC | Age: 89
End: 2024-09-30
Payer: MEDICARE

## 2024-09-30 DIAGNOSIS — M25.562 CHRONIC PAIN OF LEFT KNEE: ICD-10-CM

## 2024-09-30 DIAGNOSIS — M17.12 ARTHRITIS OF KNEE, LEFT: Primary | ICD-10-CM

## 2024-09-30 DIAGNOSIS — G89.29 CHRONIC PAIN OF LEFT KNEE: ICD-10-CM

## 2024-09-30 PROCEDURE — 1123F ACP DISCUSS/DSCN MKR DOCD: CPT

## 2024-09-30 PROCEDURE — 20610 DRAIN/INJ JOINT/BURSA W/O US: CPT

## 2024-09-30 PROCEDURE — 1160F RVW MEDS BY RX/DR IN RCRD: CPT

## 2024-09-30 PROCEDURE — 1159F MED LIST DOCD IN RCRD: CPT

## 2024-09-30 PROCEDURE — 1036F TOBACCO NON-USER: CPT

## 2024-09-30 NOTE — PROGRESS NOTES
HPI  Zhanna Perez is a 89 y.o. female  in office today for   Chief Complaint   Patient presents with    Left Knee - Pain     Durolane injection.  No new issues or concerns   .  This is her first injection.  Reports  continues pain of the left knee    Physical Exam  Constitutional: well developed, well nourished female in no acute distress  Psychiatric: normal mood, appropriate affect  Eyes: sclera anicteric  HENT: normocephalic/atraumatic  CV: regular rate and rhythm   Respiratory: non labored breathing  Integumentary: no rash  Neurological: moves all extremities    The side: left knee is without erythema, warmth, infection or rash.  No edema about the knee.    L Inj/Asp: L knee on 9/30/2024 1:18 PM  Indications: pain  Details: 22 G needle, anterolateral approach  Medications: 60 mg sodium hyaluronate 60 mg/3 mL  Outcome: tolerated well, no immediate complications  Procedure, treatment alternatives, risks and benefits explained, specific risks discussed. Consent was given by the patient. Immediately prior to procedure a time out was called to verify the correct patient, procedure, equipment, support staff and site/side marked as required. Patient was prepped and draped in the usual sterile fashion.           Assessment  Assessment: side: left knee osteoarthritis    Plan  Plan:  The side: left knee was injected per procedure note above.  Follow Up: Patient to follow up as needed if pain persists or gets worse.      All questions were answered for the patient prior to end of exam and patient addressed their understanding.    Vianey Aiken PA-C  09/30/24

## 2024-10-01 ENCOUNTER — HOSPITAL ENCOUNTER (OUTPATIENT)
Dept: PAIN MEDICINE | Facility: HOSPITAL | Age: 89
Discharge: HOME | End: 2024-10-01
Payer: MEDICARE

## 2024-10-01 VITALS
SYSTOLIC BLOOD PRESSURE: 151 MMHG | HEART RATE: 75 BPM | WEIGHT: 150 LBS | BODY MASS INDEX: 33.74 KG/M2 | DIASTOLIC BLOOD PRESSURE: 71 MMHG | TEMPERATURE: 97.3 F | HEIGHT: 56 IN | RESPIRATION RATE: 17 BRPM | OXYGEN SATURATION: 100 %

## 2024-10-01 DIAGNOSIS — M54.50 CHRONIC BILATERAL LOW BACK PAIN WITHOUT SCIATICA: ICD-10-CM

## 2024-10-01 DIAGNOSIS — G89.29 CHRONIC BILATERAL LOW BACK PAIN WITHOUT SCIATICA: ICD-10-CM

## 2024-10-01 DIAGNOSIS — M47.817 FACET ARTHROPATHY, LUMBOSACRAL: ICD-10-CM

## 2024-10-01 PROCEDURE — 2500000004 HC RX 250 GENERAL PHARMACY W/ HCPCS (ALT 636 FOR OP/ED): Performed by: ANESTHESIOLOGY

## 2024-10-01 PROCEDURE — 64493 INJ PARAVERT F JNT L/S 1 LEV: CPT | Performed by: ANESTHESIOLOGY

## 2024-10-01 PROCEDURE — 64494 INJ PARAVERT F JNT L/S 2 LEV: CPT | Performed by: ANESTHESIOLOGY

## 2024-10-01 PROCEDURE — 2500000001 HC RX 250 WO HCPCS SELF ADMINISTERED DRUGS (ALT 637 FOR MEDICARE OP)

## 2024-10-01 RX ORDER — CIPROFLOXACIN 500 MG/1
TABLET ORAL
Status: COMPLETED
Start: 2024-10-01 | End: 2024-10-01

## 2024-10-01 RX ORDER — CIPROFLOXACIN 500 MG/1
500 TABLET ORAL ONCE
Status: DISCONTINUED | OUTPATIENT
Start: 2024-10-01 | End: 2024-10-02 | Stop reason: HOSPADM

## 2024-10-01 RX ORDER — BUPIVACAINE HYDROCHLORIDE 2.5 MG/ML
INJECTION, SOLUTION EPIDURAL; INFILTRATION; INTRACAUDAL AS NEEDED
Status: COMPLETED | OUTPATIENT
Start: 2024-10-01 | End: 2024-10-01

## 2024-10-01 RX ORDER — LIDOCAINE HYDROCHLORIDE 10 MG/ML
INJECTION, SOLUTION EPIDURAL; INFILTRATION; INTRACAUDAL; PERINEURAL AS NEEDED
Status: COMPLETED | OUTPATIENT
Start: 2024-10-01 | End: 2024-10-01

## 2024-10-01 RX ORDER — TRIAMCINOLONE ACETONIDE 40 MG/ML
INJECTION, SUSPENSION INTRA-ARTICULAR; INTRAMUSCULAR AS NEEDED
Status: COMPLETED | OUTPATIENT
Start: 2024-10-01 | End: 2024-10-01

## 2024-10-01 ASSESSMENT — PAIN - FUNCTIONAL ASSESSMENT
PAIN_FUNCTIONAL_ASSESSMENT: 0-10
PAIN_FUNCTIONAL_ASSESSMENT: 0-10

## 2024-10-01 ASSESSMENT — ENCOUNTER SYMPTOMS
DEPRESSION: 0
FEVER: 0
LOSS OF SENSATION IN FEET: 0
NEUROLOGICAL NEGATIVE: 1
OCCASIONAL FEELINGS OF UNSTEADINESS: 1
SHORTNESS OF BREATH: 0
DIAPHORESIS: 0
BACK PAIN: 1
DIARRHEA: 0
NAUSEA: 0
CHILLS: 0
FATIGUE: 0
WOUND: 0
VOMITING: 0

## 2024-10-01 ASSESSMENT — PAIN SCALES - GENERAL
PAINLEVEL_OUTOF10: 7
PAINLEVEL_OUTOF10: 0 - NO PAIN

## 2024-10-01 ASSESSMENT — PAIN DESCRIPTION - DESCRIPTORS: DESCRIPTORS: ACHING

## 2024-10-01 NOTE — DISCHARGE INSTRUCTIONS
Discharge Instructions:   ° Keep Band-Aid on for the next 24 hours.    ° No showering/bathing for the next 24 hours.    ° You may notice soreness or increased pain in the area of your injection, which may continue for the first 48 hours.    ° Avoid driving or operating any heavy machinery until the next day after the procedure.  ° You should resume any medications and your regular diet after the procedure.  ° You may resume regular daily activity but should avoid strenuous activity the day of the procedure.  Some of the side affects you may experience from the steroids are:  ° Insomnia (inability to sleep)  ° Increased sweating  ° Headaches  ° Increased fluid retention (swelling of your extremities)  ° Increase appetite  ° Face flushing  ° If you are a diabetic, your blood sugars may go up.  Closely monitor your diet.  Your blood sugar should return to normal in a few days.  Complications:   ° Complications are rare with the most common being temporary increase pain near the injection site. You can apply ice to affected area on the day of the procedure.   ° If the discomfort persists, apply moist heat to the area. Serious complications are very uncommon but may include bleeding, infection or nerve damage.   ° If severe pain, fever, redness or swelling near the injection site, have someone take you to the nearest emergency room to be evaluated for procedure complications or infection.  Expectations:   ° Local anesthetics wear off in several hours but duration of relief varies from individual to individual.     If you have any questions, please call the office at [phone].    If this is an emergency, call [phone] or go to your nearest hospital.

## 2024-10-01 NOTE — H&P
"History Of Present Illness  Zhanna Perez \"Juan Alberto\" is a 89 y.o. female presenting with facet arthropathy, lumbosacral, chronic bilateral low back pain without sciatica. She is here for left L4-L5, L5-S1 diagnostic MNBB #2 under local anesthesia. She was last seen in the pain clinic on 8/30/24 with Rosaura Ferro NP. Her first left MNBB was on 8/23/24, 80-90% relief for several days. She had a right L4-L5, L5-S1 RFA on 12/18/23. Doing well today. Denies fever, chills, SOB, CP, n/v/d.      Past Medical History  Past Medical History:   Diagnosis Date    Age-related cognitive decline 03/31/2015    Age-related cognitive decline    Body mass index (BMI) 32.0-32.9, adult 10/28/2020    Body mass index (BMI) of 32.0 to 32.9 in adult    Bursitis of right shoulder 08/21/2018    Subdeltoid bursitis of right shoulder joint    Encounter for general adult medical examination without abnormal findings 04/01/2016    Medicare annual wellness visit, subsequent    Encounter for other preprocedural examination 08/30/2016    Pre-operative clearance    Erythematous condition, unspecified 05/09/2018    Breast erythema    Hyperlipidemia     Mastitis without abscess 05/09/2018    Cellulitis of breast    Mastodynia 05/09/2018    Breast tenderness    Mastodynia 05/09/2018    Pain of left breast    Obesity, unspecified 08/02/2021    Class 1 obesity with serious comorbidity and body mass index (BMI) of 33.0 to 33.9 in adult, unspecified obesity type    Obesity, unspecified 10/28/2020    Obesity (BMI 30-39.9)    Obesity, unspecified 09/15/2021    Class 1 obesity with serious comorbidity and body mass index (BMI) of 34.0 to 34.9 in adult, unspecified obesity type    Obesity, unspecified 05/03/2021    Class 1 obesity with serious comorbidity and body mass index (BMI) of 32.0 to 32.9 in adult, unspecified obesity type    Occipital neuralgia 05/12/2016    Bilateral occipital neuralgia    Other conditions influencing health status 01/28/2020    " Unsigned  Routed to Dr Lagos and Jenna BEACH  Pt would like to schedule surgery          I have discussed and recommended the following surgical procedure(s): L45 laminectomy CPT 72406        Admission Type: Observation  Time Needed: 120 minutes  Anesthesia: ORT BFT Anesthesia Types: General  Surgical Assist: Yes  Special Equipment: c-arm, kvng table  Pre-Op Medication Orders:Ancef 2g iv     Pre-Op Visit with Dr. Solis  Needs X-rays: Please send to OR  Pt need post-op Rehab: Physical Therapy Yes           Uncontrolled type 2 diabetes mellitus with complication, without long-term current use of insulin    Other conditions influencing health status 04/07/2017    Myalgia and myositis    Pain in right shoulder 07/05/2018    Pain in joint of right shoulder    Pain in unspecified hip 12/29/2014    Hip pain    Personal history of other diseases of the female genital tract 05/09/2018    History of breast swelling    Personal history of other diseases of the musculoskeletal system and connective tissue     History of tendinitis    Personal history of other diseases of the nervous system and sense organs 08/23/2017    History of sleep apnea    Personal history of other diseases of the nervous system and sense organs 03/27/2014    History of sleep apnea    Personal history of other diseases of the respiratory system 06/27/2016    History of acute bronchitis    Personal history of other diseases of the respiratory system 06/19/2020    History of acute sinusitis    Personal history of other infectious and parasitic diseases 02/25/2016    History of viral encephalitis    Trigger finger, right middle finger 03/19/2020    Trigger middle finger of right hand    Trochanteric bursitis, left hip 06/03/2019    Trochanteric bursitis of left hip    Type 2 diabetes mellitus with other skin complications 05/21/2018    Uncontrolled type 2 diabetes mellitus with other skin complication, unspecified long term insulin use status    Unilateral primary osteoarthritis, right knee 08/16/2017    Arthritis of knee, right    Urinary tract infection, site not specified 10/31/2020    Acute UTI       Surgical History  Past Surgical History:   Procedure Laterality Date    BREAST SURGERY  03/02/2015    Breast Surgery    EYE SURGERY  03/02/2015    Eye Surgery    JOINT REPLACEMENT Right     knee replacement    KNEE ARTHROSCOPY W/ DEBRIDEMENT  04/30/2013    Knee Arthroscopy (Therapeutic)    MR NECK ANGIO WO IV CONTRAST  02/26/2016    MR NECK ANGIO WO IV  CONTRAST 2/26/2016 [facility]    OTHER SURGICAL HISTORY  04/30/2013    Supracervical Hysterectomy Laparoscopic Uterus 250g Or Less    TONSILLECTOMY  04/30/2013    Tonsillectomy        Social History  She reports that she quit smoking about 73 years ago. Her smoking use included cigarettes. She has been exposed to tobacco smoke. She has never used smokeless tobacco. She reports that she does not drink alcohol and does not use drugs.    Family History  Family History   Problem Relation Name Age of Onset    Brain cancer Father      Coronary artery disease Brother          Allergies  Codeine, Darvocet a500 [propoxyphene n-acetaminophen], Dilaudid [hydromorphone], Gabapentin, and Propoxyphene-acetaminophen    Current Outpatient Medications on File Prior to Encounter   Medication Sig Dispense Refill    acetaminophen (TYLENOL ORAL) Take by mouth.      alendronate (Fosamax) 70 mg tablet Take 1 tablet (70 mg) by mouth every 7 days. IN AM W/ 6-8OZ PLAIN WATER. DO NOT EAT/LIE DOWN FOR 30 MIN AFTER 12 tablet 1    apixaban (Eliquis) 5 mg tablet Take 1 tablet (5 mg) by mouth 2 times a day. 180 tablet 1    blood pressure monitor kit Use to check BP daily and as needed 1 kit 0    blood sugar diagnostic (Premier Test Strip) strip TEST BLOOD SUGARonce daily. E11.9 100 each 1    cholecalciferol (Vitamin D-3) 25 MCG (1000 UT) capsule Take 1 capsule (25 mcg) by mouth once daily. 90 capsule 1    DULoxetine (Cymbalta) 30 mg DR capsule Take 1 capsule (30 mg) by mouth once daily. 90 capsule 1    fluticasone (Flonase) 50 mcg/actuation nasal spray Administer 2 sprays into each nostril once daily. Shake gently. Before first use, prime pump. After use, clean tip and replace cap. 48 mL 1    folic acid (Folvite) 1 mg tablet Take 1 tablet (1 mg) by mouth once daily. 90 tablet 1    FreeStyle glucose monitoring kit 1 each if needed.      glimepiride (Amaryl) 2 mg tablet Take 1 tablet (2 mg) by mouth once daily in the morning. Take before meals.  "90 tablet 1    insulin glargine (Lantus Solostar U-100 Insulin) 100 unit/mL (3 mL) pen Inject 10 Units under the skin once daily at bedtime. Take as directed per insulin instructions. 15 mL 1    levothyroxine (Synthroid, Levoxyl) 25 mcg tablet Take 1 tablet (25 mcg) by mouth once daily. 90 tablet 1    lisinopril 40 mg tablet Take 1 tablet (40 mg) by mouth once daily. 90 tablet 1    loperamide (Imodium) 2 mg capsule Take 1 capsule (2 mg) by mouth 4 times a day as needed for diarrhea.      magnesium hydroxide (Milk of Magnesia) 400 mg/5 mL suspension Take 30 mL by mouth once daily as needed for constipation.      meclizine (Antivert) 25 mg tablet Take 1 tablet (25 mg) by mouth 3 times a day as needed for dizziness. (Patient not taking: Reported on 8/30/2024) 30 tablet 1    omeprazole (PriLOSEC) 20 mg DR capsule Take 1 capsule (20 mg) by mouth once daily in the morning. Take before meals. Do not crush or chew. (Patient not taking: Reported on 8/30/2024) 90 capsule 1    ondansetron (Zofran) 4 mg tablet Take 1 tablet (4 mg) by mouth every 8 hours if needed for nausea or vomiting.      pen needle, diabetic (BD Ultra-Fine Mini Pen Needle) 31 gauge x 3/16\" needle INJECT 1 EACH UNDER THE SKIN ONCE DAILY. USE AS INSTRUCTED. USE TO INJECT INSULIN 100 each 1    rosuvastatin (Crestor) 20 mg tablet Take 1 tablet (20 mg) by mouth once daily. 90 tablet 1    tiZANidine (Zanaflex) 2 mg tablet TAKE 1 TABLET (2 MG) BY MOUTH 2 TIMES A DAY AS NEEDED FOR MUSCLE SPASMS. (Patient not taking: Reported on 8/30/2024) 60 tablet 0    vit C/E/Zn/coppr/lutein/zeaxan (PRESERVISION AREDS-2 ORAL) Take 1 capsule by mouth 2 times a day.       Current Facility-Administered Medications on File Prior to Encounter   Medication Dose Route Frequency Provider Last Rate Last Admin    [COMPLETED] sodium hyaluronate (Durolane) injection 60 mg  60 mg intra-articular Once PRN Vianey Aiken PA-C   60 mg at 09/30/24 1318       Review of Systems   Constitutional:  " Negative for chills, diaphoresis, fatigue and fever.   HENT: Negative.     Respiratory:  Negative for shortness of breath.    Cardiovascular:  Negative for chest pain.   Gastrointestinal:  Negative for diarrhea, nausea and vomiting.   Genitourinary: Negative.    Musculoskeletal:  Positive for back pain.   Skin:  Negative for pallor, rash and wound.   Neurological: Negative.         Physical Exam  Constitutional:       General: She is not in acute distress.     Appearance: Normal appearance.   HENT:      Head: Normocephalic.      Mouth/Throat:      Mouth: Mucous membranes are moist.   Eyes:      General: No scleral icterus.     Conjunctiva/sclera: Conjunctivae normal.      Pupils: Pupils are equal, round, and reactive to light.   Cardiovascular:      Rate and Rhythm: Normal rate and regular rhythm.      Pulses: Normal pulses.      Heart sounds: Normal heart sounds.   Pulmonary:      Effort: Pulmonary effort is normal. No respiratory distress.      Breath sounds: Normal breath sounds.   Musculoskeletal:         General: Tenderness present.   Skin:     General: Skin is warm and dry.   Neurological:      General: No focal deficit present.      Mental Status: She is alert and oriented to person, place, and time. Mental status is at baseline.   Psychiatric:         Mood and Affect: Mood normal.          Last Recorded Vitals  Vitals:    10/01/24 1124   BP: 138/72   Pulse: 79   Resp: 17   Temp: 36.4 °C (97.5 °F)   SpO2: 98%           Assessment/Plan   Assessment & Plan  Facet arthropathy, lumbosacral    Chronic bilateral low back pain without sciatica      Left L4-L5, L5-S1 diagnostic MNBB #2 under local anesthesia       I spent 25 minutes in the professional and overall care of this patient.      Tammy Bajwa PA-C

## 2024-10-03 ENCOUNTER — TELEPHONE (OUTPATIENT)
Dept: PAIN MEDICINE | Facility: HOSPITAL | Age: 89
End: 2024-10-03
Payer: MEDICARE

## 2024-10-03 NOTE — TELEPHONE ENCOUNTER
A message was left for pt and her daughter letting them know apt info, and to call back if appointment is not a good date or time.     Chantal Romo MA

## 2024-10-03 NOTE — TELEPHONE ENCOUNTER
----- Message from Rosaura Ferro sent at 10/3/2024 10:15 AM EDT -----  Please create an encounter.    Patient has no postprocedure follow-up.  I scheduled her to see me in [place] on 11/1/2024 at 10:40 AM.

## 2024-10-21 ENCOUNTER — APPOINTMENT (OUTPATIENT)
Dept: PAIN MEDICINE | Facility: HOSPITAL | Age: 89
End: 2024-10-21
Payer: MEDICARE

## 2024-11-01 ENCOUNTER — OFFICE VISIT (OUTPATIENT)
Dept: PAIN MEDICINE | Facility: HOSPITAL | Age: 89
End: 2024-11-01
Payer: MEDICARE

## 2024-11-01 VITALS
OXYGEN SATURATION: 98 % | HEART RATE: 76 BPM | TEMPERATURE: 98.1 F | HEIGHT: 57 IN | RESPIRATION RATE: 15 BRPM | WEIGHT: 157 LBS | SYSTOLIC BLOOD PRESSURE: 171 MMHG | DIASTOLIC BLOOD PRESSURE: 71 MMHG | BODY MASS INDEX: 33.87 KG/M2

## 2024-11-01 DIAGNOSIS — Z79.899 MEDICATION MANAGEMENT: ICD-10-CM

## 2024-11-01 DIAGNOSIS — G89.29 CHRONIC BILATERAL LOW BACK PAIN WITHOUT SCIATICA: ICD-10-CM

## 2024-11-01 DIAGNOSIS — M54.50 CHRONIC BILATERAL LOW BACK PAIN WITHOUT SCIATICA: ICD-10-CM

## 2024-11-01 DIAGNOSIS — M47.817 FACET ARTHROPATHY, LUMBOSACRAL: Primary | ICD-10-CM

## 2024-11-01 PROCEDURE — 99213 OFFICE O/P EST LOW 20 MIN: CPT | Performed by: NURSE PRACTITIONER

## 2024-11-01 ASSESSMENT — ENCOUNTER SYMPTOMS
CARDIOVASCULAR NEGATIVE: 1
ENDOCRINE NEGATIVE: 1
NECK PAIN: 0
JOINT SWELLING: 0
NECK STIFFNESS: 0
GASTROINTESTINAL NEGATIVE: 1
NEUROLOGICAL NEGATIVE: 1
CONSTITUTIONAL NEGATIVE: 1
ALLERGIC/IMMUNOLOGIC NEGATIVE: 1
PSYCHIATRIC NEGATIVE: 1
BACK PAIN: 1
ARTHRALGIAS: 1
MYALGIAS: 1
RESPIRATORY NEGATIVE: 1
EYES NEGATIVE: 1

## 2024-11-01 ASSESSMENT — COLUMBIA-SUICIDE SEVERITY RATING SCALE - C-SSRS
6. HAVE YOU EVER DONE ANYTHING, STARTED TO DO ANYTHING, OR PREPARED TO DO ANYTHING TO END YOUR LIFE?: NO
2. HAVE YOU ACTUALLY HAD ANY THOUGHTS OF KILLING YOURSELF?: NO
1. IN THE PAST MONTH, HAVE YOU WISHED YOU WERE DEAD OR WISHED YOU COULD GO TO SLEEP AND NOT WAKE UP?: NO

## 2024-11-01 ASSESSMENT — PAIN SCALES - GENERAL: PAINLEVEL_OUTOF10: 8

## 2024-11-14 ENCOUNTER — TELEPHONE (OUTPATIENT)
Dept: PAIN MEDICINE | Facility: HOSPITAL | Age: 89
End: 2024-11-14
Payer: MEDICARE

## 2024-11-14 DIAGNOSIS — M47.817 FACET ARTHROPATHY, LUMBOSACRAL: Primary | ICD-10-CM

## 2024-11-19 ENCOUNTER — APPOINTMENT (OUTPATIENT)
Dept: PAIN MEDICINE | Facility: HOSPITAL | Age: 89
End: 2024-11-19
Payer: MEDICARE

## 2024-12-02 ENCOUNTER — APPOINTMENT (OUTPATIENT)
Dept: PRIMARY CARE | Facility: CLINIC | Age: 89
End: 2024-12-02
Payer: MEDICARE

## 2024-12-06 ENCOUNTER — APPOINTMENT (OUTPATIENT)
Dept: PRIMARY CARE | Facility: CLINIC | Age: 89
End: 2024-12-06
Payer: MEDICARE

## 2024-12-06 VITALS
SYSTOLIC BLOOD PRESSURE: 131 MMHG | BODY MASS INDEX: 34.09 KG/M2 | WEIGHT: 158 LBS | OXYGEN SATURATION: 98 % | DIASTOLIC BLOOD PRESSURE: 68 MMHG | TEMPERATURE: 97.8 F | HEART RATE: 84 BPM | HEIGHT: 57 IN

## 2024-12-06 DIAGNOSIS — E78.5 BORDERLINE HYPERLIPIDEMIA: ICD-10-CM

## 2024-12-06 DIAGNOSIS — M54.12 CERVICAL RADICULOPATHY: ICD-10-CM

## 2024-12-06 DIAGNOSIS — E55.9 VITAMIN D INSUFFICIENCY: ICD-10-CM

## 2024-12-06 DIAGNOSIS — E03.9 HYPOTHYROIDISM, ADULT: ICD-10-CM

## 2024-12-06 DIAGNOSIS — Z00.00 MEDICARE ANNUAL WELLNESS VISIT, SUBSEQUENT: Primary | ICD-10-CM

## 2024-12-06 DIAGNOSIS — E11.9 TYPE 2 DIABETES MELLITUS WITHOUT COMPLICATION, UNSPECIFIED WHETHER LONG TERM INSULIN USE (MULTI): ICD-10-CM

## 2024-12-06 DIAGNOSIS — M25.511 ACUTE PAIN OF RIGHT SHOULDER: ICD-10-CM

## 2024-12-06 LAB
POC ALBUMIN /CREATININE RATIO MANUALLY ENTERED: <30 UG/MG CREAT
POC HEMOGLOBIN A1C: 7.1 % (ref 4.2–6.5)
POC URINE ALBUMIN: 30 MG/L
POC URINE CREATININE: 100 MG/DL

## 2024-12-06 PROCEDURE — 1036F TOBACCO NON-USER: CPT | Performed by: PHYSICIAN ASSISTANT

## 2024-12-06 PROCEDURE — 3078F DIAST BP <80 MM HG: CPT | Performed by: PHYSICIAN ASSISTANT

## 2024-12-06 PROCEDURE — 1160F RVW MEDS BY RX/DR IN RCRD: CPT | Performed by: PHYSICIAN ASSISTANT

## 2024-12-06 PROCEDURE — 3075F SYST BP GE 130 - 139MM HG: CPT | Performed by: PHYSICIAN ASSISTANT

## 2024-12-06 PROCEDURE — 1123F ACP DISCUSS/DSCN MKR DOCD: CPT | Performed by: PHYSICIAN ASSISTANT

## 2024-12-06 PROCEDURE — 83036 HEMOGLOBIN GLYCOSYLATED A1C: CPT | Performed by: PHYSICIAN ASSISTANT

## 2024-12-06 PROCEDURE — 82044 UR ALBUMIN SEMIQUANTITATIVE: CPT | Performed by: PHYSICIAN ASSISTANT

## 2024-12-06 PROCEDURE — 1159F MED LIST DOCD IN RCRD: CPT | Performed by: PHYSICIAN ASSISTANT

## 2024-12-06 PROCEDURE — G0439 PPPS, SUBSEQ VISIT: HCPCS | Performed by: PHYSICIAN ASSISTANT

## 2024-12-06 PROCEDURE — 1170F FXNL STATUS ASSESSED: CPT | Performed by: PHYSICIAN ASSISTANT

## 2024-12-06 ASSESSMENT — PATIENT HEALTH QUESTIONNAIRE - PHQ9
2. FEELING DOWN, DEPRESSED OR HOPELESS: SEVERAL DAYS
10. IF YOU CHECKED OFF ANY PROBLEMS, HOW DIFFICULT HAVE THESE PROBLEMS MADE IT FOR YOU TO DO YOUR WORK, TAKE CARE OF THINGS AT HOME, OR GET ALONG WITH OTHER PEOPLE: SOMEWHAT DIFFICULT
1. LITTLE INTEREST OR PLEASURE IN DOING THINGS: NOT AT ALL
SUM OF ALL RESPONSES TO PHQ9 QUESTIONS 1 AND 2: 1

## 2024-12-06 ASSESSMENT — ENCOUNTER SYMPTOMS
OCCASIONAL FEELINGS OF UNSTEADINESS: 1
LOSS OF SENSATION IN FEET: 0
DEPRESSION: 1

## 2024-12-06 ASSESSMENT — ACTIVITIES OF DAILY LIVING (ADL)
BATHING: INDEPENDENT
GROCERY_SHOPPING: TOTAL CARE
MANAGING_FINANCES: TOTAL CARE
DOING_HOUSEWORK: TOTAL CARE
TAKING_MEDICATION: TOTAL CARE
DRESSING: INDEPENDENT

## 2024-12-06 NOTE — PROGRESS NOTES
Subjective     HPI   Zhanna Perez is a 89 y.o. year old female patient with presenting to clinic with concern for   Chief Complaint   Patient presents with    New Patient Visit    Medicare Annual Wellness Visit Danny Avendano is a new patient presenting to clinic to establish care. Formerly under the care of Lynsey Garibay CNP    Right arm pain- shoulder, upper and lower arm  OT has not been helping, Tenderness to muscle and shoulder joint, worsened by movement.  No numbness or weakness.        Living at [facility].   Receiving PT/OT.  Her balance has improved. Memory care at the [facility] also      Bilateral lower leg edema improved with furosemide and stopping amlodipine.  Educated regarding low-sodium diet, elevating legs throughout the day, compression socks.    Diabetes, not controlled,  A1c 8.5%.    -Lantus at 10 units at bedtime.  -glimepiride 2 mg qam     -Continue to monitor blood sugar 3 times a day and as needed.  ACEi/ARB: Yes  Statin: Yes  ASA: No  Lab Results   Component Value Date    HGBA1C 7.1 (A) 12/06/2024         GERD  -omeprazole 20 mg every morning.     Bilateral pulmonary emboli - August 2021  Saw hematology- PE was provoked related to spinal stenosis and decreased mobility.   -Eliquis long-term due to her risk of recurrent immobility  -Hematology cleared her for spinal injections with holding Eliquis for 3 days prior to the procedure and resuming the day after.     Lumbar spondylosis, lumbar radiculitis  -pain management, receiving injections.  Doing very well    HTN  -lisinopril 40 mg daily.  BP Readings from Last 5 Encounters:   12/06/24 131/68   11/01/24 171/71   10/01/24 151/71   08/30/24 149/68   08/23/24 157/86       Osteoporosis  -alendronate 70 mg weekly  -Os-João daily.     Vitamin D deficiency  vitamin D 25 mcg daily    chronic knee pain- ortho  Found to have distal fracture femur  She was placed in a brace but has not been wearing.    HLD  -rosuvastatin  20 mg daily    Hypothyroidism  -levothyroxine 25 mcg daily.    Depression & Arthritis pain  -Cymbalta 30 mg  qd      Patient Active Problem List   Diagnosis    Allergic rhinitis    Arthritis of both hands    Arthritis of knee, left    Arthritis of shoulder    Blindness    BPV (benign positional vertigo)    Cervical disc disorder with radiculopathy    Cervical disc herniation    Cervical neuritis    Depression    Depression, major, in remission (CMS-[facility])    HTN (hypertension)    Diabetes mellitus (Multi)    Hypothyroidism, adult    Involuntary movements    Joint pain in fingers of both hands    Atypical chest pain    Left ankle pain    Left hip pain    Left knee pain    Chronic bilateral low back pain without sciatica    Lumbar radiculitis    Facet arthropathy, lumbosacral    Macular degeneration    Hypercholesterolemia    Hypertriglyceridemia    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)    Nontoxic multinodular goiter    Osteopenia    Osteoporosis    Pulmonary embolism, bilateral (Multi)    Leg swelling    TIA (transient ischemic attack)    Unsteadiness    Urinary symptom or sign    Vitamin B12 deficiency    Vitamin D deficiency    Wedge deformity on x-ray of spine    Lumbar spinal stenosis    Right lumbar radiculopathy    Type 2 diabetes mellitus with hyperglycemia, with long-term current use of insulin    Sacroiliitis (CMS-[facility])    Scalp laceration    Exudative age-related macular degeneration, bilateral, with active choroidal neovascularization    Myofascial pain       Past Medical History:   Diagnosis Date    Age-related cognitive decline 03/31/2015    Age-related cognitive decline    Body mass index (BMI) 32.0-32.9, adult 10/28/2020    Body mass index (BMI) of 32.0 to 32.9 in adult    Bursitis of right shoulder 08/21/2018    Subdeltoid bursitis of right shoulder joint    Encounter for general adult medical examination without abnormal findings 04/01/2016    Medicare annual wellness visit, subsequent    Encounter for other  preprocedural examination 08/30/2016    Pre-operative clearance    Erythematous condition, unspecified 05/09/2018    Breast erythema    Hyperlipidemia     Mastitis without abscess 05/09/2018    Cellulitis of breast    Mastodynia 05/09/2018    Breast tenderness    Mastodynia 05/09/2018    Pain of left breast    Obesity, unspecified 08/02/2021    Class 1 obesity with serious comorbidity and body mass index (BMI) of 33.0 to 33.9 in adult, unspecified obesity type    Obesity, unspecified 10/28/2020    Obesity (BMI 30-39.9)    Obesity, unspecified 09/15/2021    Class 1 obesity with serious comorbidity and body mass index (BMI) of 34.0 to 34.9 in adult, unspecified obesity type    Obesity, unspecified 05/03/2021    Class 1 obesity with serious comorbidity and body mass index (BMI) of 32.0 to 32.9 in adult, unspecified obesity type    Occipital neuralgia 05/12/2016    Bilateral occipital neuralgia    Other conditions influencing health status 01/28/2020    Uncontrolled type 2 diabetes mellitus with complication, without long-term current use of insulin    Other conditions influencing health status 04/07/2017    Myalgia and myositis    Pain in right shoulder 07/05/2018    Pain in joint of right shoulder    Pain in unspecified hip 12/29/2014    Hip pain    Personal history of other diseases of the female genital tract 05/09/2018    History of breast swelling    Personal history of other diseases of the musculoskeletal system and connective tissue     History of tendinitis    Personal history of other diseases of the nervous system and sense organs 08/23/2017    History of sleep apnea    Personal history of other diseases of the nervous system and sense organs 03/27/2014    History of sleep apnea    Personal history of other diseases of the respiratory system 06/27/2016    History of acute bronchitis    Personal history of other diseases of the respiratory system 06/19/2020    History of acute sinusitis    Personal history of  other infectious and parasitic diseases 2016    History of viral encephalitis    Trigger finger, right middle finger 2020    Trigger middle finger of right hand    Trochanteric bursitis, left hip 2019    Trochanteric bursitis of left hip    Type 2 diabetes mellitus with other skin complications 2018    Uncontrolled type 2 diabetes mellitus with other skin complication, unspecified long term insulin use status    Unilateral primary osteoarthritis, right knee 2017    Arthritis of knee, right    Urinary tract infection, site not specified 10/31/2020    Acute UTI      Past Surgical History:   Procedure Laterality Date    BREAST SURGERY  2015    Breast Surgery    EYE SURGERY  2015    Eye Surgery    JOINT REPLACEMENT Right     knee replacement    KNEE ARTHROSCOPY W/ DEBRIDEMENT  2013    Knee Arthroscopy (Therapeutic)    MR NECK ANGIO WO IV CONTRAST  2016    MR NECK ANGIO WO IV CONTRAST 2016 [facility]    OTHER SURGICAL HISTORY  2013    Supracervical Hysterectomy Laparoscopic Uterus 250g Or Less    TONSILLECTOMY  2013    Tonsillectomy      Family History   Problem Relation Name Age of Onset    Brain cancer Father      Coronary artery disease Brother        Social History     Tobacco Use    Smoking status: Former     Current packs/day: 0.00     Types: Cigarettes     Quit date:      Years since quittin.9     Passive exposure: Past    Smokeless tobacco: Never   Substance Use Topics    Alcohol use: Never        Current Outpatient Medications:     acetaminophen (TYLENOL ORAL), Take by mouth., Disp: , Rfl:     alendronate (Fosamax) 70 mg tablet, Take 1 tablet (70 mg) by mouth every 7 days. IN AM W/ 6-8OZ PLAIN WATER. DO NOT EAT/LIE DOWN FOR 30 MIN AFTER, Disp: 12 tablet, Rfl: 1    apixaban (Eliquis) 5 mg tablet, Take 1 tablet (5 mg) by mouth 2 times a day., Disp: 180 tablet, Rfl: 1    blood pressure monitor kit, Use to check BP daily and as  needed, Disp: 1 kit, Rfl: 0    blood sugar diagnostic (Premier Test Strip) strip, TEST BLOOD SUGARonce daily. E11.9, Disp: 100 each, Rfl: 1    cholecalciferol (Vitamin D-3) 25 MCG (1000 UT) capsule, Take 1 capsule (25 mcg) by mouth once daily., Disp: 90 capsule, Rfl: 1    DULoxetine (Cymbalta) 30 mg DR capsule, Take 1 capsule (30 mg) by mouth once daily., Disp: 90 capsule, Rfl: 1    fluticasone (Flonase) 50 mcg/actuation nasal spray, Administer 2 sprays into each nostril once daily. Shake gently. Before first use, prime pump. After use, clean tip and replace cap., Disp: 48 mL, Rfl: 1    folic acid (Folvite) 1 mg tablet, Take 1 tablet (1 mg) by mouth once daily., Disp: 90 tablet, Rfl: 1    FreeStyle glucose monitoring kit, 1 each if needed., Disp: , Rfl:     glimepiride (Amaryl) 2 mg tablet, Take 1 tablet (2 mg) by mouth once daily in the morning. Take before meals., Disp: 90 tablet, Rfl: 1    insulin glargine (Lantus Solostar U-100 Insulin) 100 unit/mL (3 mL) pen, Inject 10 Units under the skin once daily at bedtime. Take as directed per insulin instructions., Disp: 15 mL, Rfl: 1    levothyroxine (Synthroid, Levoxyl) 25 mcg tablet, Take 1 tablet (25 mcg) by mouth once daily., Disp: 90 tablet, Rfl: 1    lisinopril 40 mg tablet, Take 1 tablet (40 mg) by mouth once daily., Disp: 90 tablet, Rfl: 1    loperamide (Imodium) 2 mg capsule, Take 1 capsule (2 mg) by mouth 4 times a day as needed for diarrhea., Disp: , Rfl:     magnesium hydroxide (Milk of Magnesia) 400 mg/5 mL suspension, Take 30 mL by mouth once daily as needed for constipation., Disp: , Rfl:     meclizine (Antivert) 25 mg tablet, Take 1 tablet (25 mg) by mouth 3 times a day as needed for dizziness., Disp: 30 tablet, Rfl: 1    omeprazole (PriLOSEC) 20 mg DR capsule, Take 1 capsule (20 mg) by mouth once daily in the morning. Take before meals. Do not crush or chew., Disp: 90 capsule, Rfl: 1    ondansetron (Zofran) 4 mg tablet, Take 1 tablet (4 mg) by mouth  "every 8 hours if needed for nausea or vomiting., Disp: , Rfl:     pen needle, diabetic (BD Ultra-Fine Mini Pen Needle) 31 gauge x 3/16\" needle, INJECT 1 EACH UNDER THE SKIN ONCE DAILY. USE AS INSTRUCTED. USE TO INJECT INSULIN, Disp: 100 each, Rfl: 1    rosuvastatin (Crestor) 20 mg tablet, Take 1 tablet (20 mg) by mouth once daily., Disp: 90 tablet, Rfl: 1    tiZANidine (Zanaflex) 2 mg tablet, TAKE 1 TABLET (2 MG) BY MOUTH 2 TIMES A DAY AS NEEDED FOR MUSCLE SPASMS., Disp: 60 tablet, Rfl: 0    vit C/E/Zn/coppr/lutein/zeaxan (PRESERVISION AREDS-2 ORAL), Take 1 capsule by mouth 2 times a day., Disp: , Rfl:      Review of Systems  Constitutional: Denies fever  HEENT: Denies ST, earache  CVS: Denies Chest pain  Pulmonary: Denies wheezing, SOB  GI: Denies N/V  : Denies dysuria  Musculoskeletal:  Denies myalgia  Neuro: Denies focal weakness or numbness.  Skin: Denies Rashes.  *Review of Systems is negative unless otherwise mentioned in HPI or ROS above.    Objective   /68   Pulse 84   Temp 36.6 °C (97.8 °F)   Ht 1.448 m (4' 9\")   Wt 71.7 kg (158 lb)   SpO2 98%   BMI 34.19 kg/m²  reviewed Body mass index is 34.19 kg/m².     Physical Exam  Constitutional: NAD.  Resting comfortably.  Head: Atraumatic, normocephalic.  ENT: Moist oral mucosa. Nasal mucosa wnl.   Cardiac: Regular rate & rhythm.   Pulmonary: Lungs clear bilat  GI: Soft, Nontender, nondistended.   Musculoskeletal: No peripheral edema. R Anterior shoulder tenderness with limited ROM external rotation.  Concern for rotator cuff injury.   Skin: No evidence of trauma. No rashes  Psych: Intact judgement and insight.    .Assessment/Plan   Problem List Items Addressed This Visit             ICD-10-CM    Diabetes mellitus (Multi) E11.9    Relevant Orders    POCT glycosylated hemoglobin (Hb A1C) manually resulted (Completed)    POCT Albumin random urine manually resulted (Completed)    XR cervical spine 2-3 views    Hypothyroidism, adult E03.9    Relevant " Orders    TSH     Other Visit Diagnoses         Codes    Medicare annual wellness visit, subsequent    -  Primary Z00.00    Cervical radiculopathy     M54.12    Relevant Orders    XR shoulder right 2+ views    Borderline hyperlipidemia     E78.5    Relevant Orders    CBC    Comprehensive Metabolic Panel    Lipid Panel    Vitamin D insufficiency     E55.9    Relevant Orders    Vitamin D 25-Hydroxy,Total (for eval of Vitamin D levels)    Acute pain of right shoulder     M25.511          Juan Alberto is not a great surgical candidate for her shoulder. I'd recommend Xrays first to evaluate the area, then perhaps OT/PT for the shoulder to strengthen the surrounding area.

## 2024-12-09 ENCOUNTER — HOSPITAL ENCOUNTER (OUTPATIENT)
Dept: RADIOLOGY | Facility: HOSPITAL | Age: 89
Discharge: HOME | End: 2024-12-09
Payer: MEDICARE

## 2024-12-09 ENCOUNTER — LAB (OUTPATIENT)
Dept: LAB | Facility: LAB | Age: 89
End: 2024-12-09
Payer: MEDICARE

## 2024-12-09 DIAGNOSIS — E55.9 VITAMIN D INSUFFICIENCY: ICD-10-CM

## 2024-12-09 DIAGNOSIS — M54.12 CERVICAL RADICULOPATHY: ICD-10-CM

## 2024-12-09 DIAGNOSIS — E03.9 HYPOTHYROIDISM, ADULT: ICD-10-CM

## 2024-12-09 DIAGNOSIS — E78.5 BORDERLINE HYPERLIPIDEMIA: ICD-10-CM

## 2024-12-09 DIAGNOSIS — E11.9 TYPE 2 DIABETES MELLITUS WITHOUT COMPLICATION, UNSPECIFIED WHETHER LONG TERM INSULIN USE (MULTI): ICD-10-CM

## 2024-12-09 LAB
25(OH)D3 SERPL-MCNC: 35 NG/ML (ref 30–100)
ALBUMIN SERPL BCP-MCNC: 4.1 G/DL (ref 3.4–5)
ALP SERPL-CCNC: 46 U/L (ref 33–136)
ALT SERPL W P-5'-P-CCNC: 11 U/L (ref 7–45)
ANION GAP SERPL CALC-SCNC: 14 MMOL/L (ref 10–20)
AST SERPL W P-5'-P-CCNC: 15 U/L (ref 9–39)
BILIRUB SERPL-MCNC: 0.3 MG/DL (ref 0–1.2)
BUN SERPL-MCNC: 17 MG/DL (ref 6–23)
CALCIUM SERPL-MCNC: 9.5 MG/DL (ref 8.6–10.3)
CHLORIDE SERPL-SCNC: 103 MMOL/L (ref 98–107)
CHOLEST SERPL-MCNC: 134 MG/DL (ref 0–199)
CHOLESTEROL/HDL RATIO: 3.1
CO2 SERPL-SCNC: 28 MMOL/L (ref 21–32)
CREAT SERPL-MCNC: 0.91 MG/DL (ref 0.5–1.05)
EGFRCR SERPLBLD CKD-EPI 2021: 59 ML/MIN/1.73M*2
ERYTHROCYTE [DISTWIDTH] IN BLOOD BY AUTOMATED COUNT: 14 % (ref 11.5–14.5)
GLUCOSE SERPL-MCNC: 128 MG/DL (ref 74–99)
HCT VFR BLD AUTO: 34 % (ref 36–46)
HDLC SERPL-MCNC: 42.6 MG/DL
HGB BLD-MCNC: 10.4 G/DL (ref 12–16)
LDLC SERPL CALC-MCNC: 46 MG/DL
MCH RBC QN AUTO: 26.9 PG (ref 26–34)
MCHC RBC AUTO-ENTMCNC: 30.6 G/DL (ref 32–36)
MCV RBC AUTO: 88 FL (ref 80–100)
NON HDL CHOLESTEROL: 91 MG/DL (ref 0–149)
NRBC BLD-RTO: 0 /100 WBCS (ref 0–0)
PLATELET # BLD AUTO: 280 X10*3/UL (ref 150–450)
POTASSIUM SERPL-SCNC: 4.4 MMOL/L (ref 3.5–5.3)
PROT SERPL-MCNC: 6.7 G/DL (ref 6.4–8.2)
RBC # BLD AUTO: 3.87 X10*6/UL (ref 4–5.2)
SODIUM SERPL-SCNC: 141 MMOL/L (ref 136–145)
TRIGL SERPL-MCNC: 229 MG/DL (ref 0–149)
TSH SERPL-ACNC: 2.34 MIU/L (ref 0.44–3.98)
VLDL: 46 MG/DL (ref 0–40)
WBC # BLD AUTO: 7.8 X10*3/UL (ref 4.4–11.3)

## 2024-12-09 PROCEDURE — 72050 X-RAY EXAM NECK SPINE 4/5VWS: CPT

## 2024-12-09 PROCEDURE — 36415 COLL VENOUS BLD VENIPUNCTURE: CPT

## 2024-12-09 PROCEDURE — 84443 ASSAY THYROID STIM HORMONE: CPT

## 2024-12-09 PROCEDURE — 80061 LIPID PANEL: CPT

## 2024-12-09 PROCEDURE — 85027 COMPLETE CBC AUTOMATED: CPT

## 2024-12-09 PROCEDURE — 82306 VITAMIN D 25 HYDROXY: CPT

## 2024-12-09 PROCEDURE — 72050 X-RAY EXAM NECK SPINE 4/5VWS: CPT | Performed by: RADIOLOGY

## 2024-12-09 PROCEDURE — 80053 COMPREHEN METABOLIC PANEL: CPT

## 2024-12-09 PROCEDURE — 73030 X-RAY EXAM OF SHOULDER: CPT | Mod: RIGHT SIDE | Performed by: RADIOLOGY

## 2024-12-09 PROCEDURE — 73030 X-RAY EXAM OF SHOULDER: CPT | Mod: RT

## 2024-12-16 DIAGNOSIS — M54.12 CERVICAL RADICULOPATHY: Primary | ICD-10-CM

## 2024-12-17 ENCOUNTER — HOSPITAL ENCOUNTER (OUTPATIENT)
Dept: CARDIOLOGY | Facility: HOSPITAL | Age: 89
Discharge: HOME | End: 2024-12-17
Payer: MEDICARE

## 2024-12-17 ENCOUNTER — HOSPITAL ENCOUNTER (OUTPATIENT)
Dept: PAIN MEDICINE | Facility: HOSPITAL | Age: 89
Discharge: HOME | End: 2024-12-17
Payer: MEDICARE

## 2024-12-17 VITALS
HEIGHT: 56 IN | OXYGEN SATURATION: 100 % | TEMPERATURE: 97 F | HEART RATE: 71 BPM | BODY MASS INDEX: 35.42 KG/M2 | DIASTOLIC BLOOD PRESSURE: 72 MMHG | SYSTOLIC BLOOD PRESSURE: 164 MMHG | RESPIRATION RATE: 16 BRPM

## 2024-12-17 DIAGNOSIS — M47.817 FACET ARTHROPATHY, LUMBOSACRAL: ICD-10-CM

## 2024-12-17 LAB
ATRIAL RATE: 73 BPM
GLUCOSE BLD MANUAL STRIP-MCNC: 120 MG/DL (ref 74–99)
GLUCOSE BLD MANUAL STRIP-MCNC: 135 MG/DL (ref 74–99)
P AXIS: 31 DEGREES
P OFFSET: 170 MS
P ONSET: 120 MS
PR INTERVAL: 198 MS
Q ONSET: 219 MS
QRS COUNT: 12 BEATS
QRS DURATION: 80 MS
QT INTERVAL: 388 MS
QTC CALCULATION(BAZETT): 427 MS
QTC FREDERICIA: 414 MS
R AXIS: 26 DEGREES
T AXIS: 73 DEGREES
T OFFSET: 413 MS
VENTRICULAR RATE: 73 BPM

## 2024-12-17 PROCEDURE — 93005 ELECTROCARDIOGRAM TRACING: CPT

## 2024-12-17 PROCEDURE — 2720000007 HC OR 272 NO HCPCS

## 2024-12-17 PROCEDURE — 64635 DESTROY LUMB/SAC FACET JNT: CPT | Performed by: ANESTHESIOLOGY

## 2024-12-17 PROCEDURE — 2500000004 HC RX 250 GENERAL PHARMACY W/ HCPCS (ALT 636 FOR OP/ED)

## 2024-12-17 PROCEDURE — 2500000001 HC RX 250 WO HCPCS SELF ADMINISTERED DRUGS (ALT 637 FOR MEDICARE OP): Performed by: NURSE PRACTITIONER

## 2024-12-17 PROCEDURE — 64635 DESTROY LUMB/SAC FACET JNT: CPT | Mod: 50 | Performed by: ANESTHESIOLOGY

## 2024-12-17 PROCEDURE — 64636 DESTROY L/S FACET JNT ADDL: CPT | Mod: 50 | Performed by: ANESTHESIOLOGY

## 2024-12-17 PROCEDURE — 2500000004 HC RX 250 GENERAL PHARMACY W/ HCPCS (ALT 636 FOR OP/ED): Mod: JW | Performed by: ANESTHESIOLOGY

## 2024-12-17 PROCEDURE — 64636 DESTROY L/S FACET JNT ADDL: CPT | Performed by: ANESTHESIOLOGY

## 2024-12-17 PROCEDURE — 82947 ASSAY GLUCOSE BLOOD QUANT: CPT

## 2024-12-17 RX ORDER — BUPIVACAINE HYDROCHLORIDE 2.5 MG/ML
INJECTION, SOLUTION EPIDURAL; INFILTRATION; INTRACAUDAL AS NEEDED
Status: COMPLETED | OUTPATIENT
Start: 2024-12-17 | End: 2024-12-17

## 2024-12-17 RX ORDER — LIDOCAINE HYDROCHLORIDE 20 MG/ML
INJECTION, SOLUTION EPIDURAL; INFILTRATION; INTRACAUDAL; PERINEURAL AS NEEDED
Status: COMPLETED | OUTPATIENT
Start: 2024-12-17 | End: 2024-12-17

## 2024-12-17 RX ORDER — AMMONIUM LACTATE 12 G/100G
LOTION TOPICAL
COMMUNITY
Start: 2024-10-18

## 2024-12-17 RX ORDER — CIPROFLOXACIN 500 MG/1
500 TABLET ORAL ONCE
Status: COMPLETED | OUTPATIENT
Start: 2024-12-17 | End: 2024-12-17

## 2024-12-17 RX ORDER — METHYLPREDNISOLONE ACETATE 80 MG/ML
INJECTION, SUSPENSION INTRA-ARTICULAR; INTRALESIONAL; INTRAMUSCULAR; SOFT TISSUE AS NEEDED
Status: COMPLETED | OUTPATIENT
Start: 2024-12-17 | End: 2024-12-17

## 2024-12-17 RX ORDER — SODIUM CHLORIDE, SODIUM LACTATE, POTASSIUM CHLORIDE, CALCIUM CHLORIDE 600; 310; 30; 20 MG/100ML; MG/100ML; MG/100ML; MG/100ML
INJECTION, SOLUTION INTRAVENOUS
Status: DISCONTINUED
Start: 2024-12-17 | End: 2024-12-17 | Stop reason: HOSPADM

## 2024-12-17 RX ORDER — LIDOCAINE HYDROCHLORIDE 10 MG/ML
INJECTION, SOLUTION EPIDURAL; INFILTRATION; INTRACAUDAL; PERINEURAL AS NEEDED
Status: COMPLETED | OUTPATIENT
Start: 2024-12-17 | End: 2024-12-17

## 2024-12-17 RX ORDER — ONDANSETRON 4 MG/1
4 TABLET, ORALLY DISINTEGRATING ORAL ONCE
Status: DISCONTINUED | OUTPATIENT
Start: 2024-12-17 | End: 2024-12-18 | Stop reason: HOSPADM

## 2024-12-17 ASSESSMENT — ENCOUNTER SYMPTOMS
MUSCULOSKELETAL NEGATIVE: 1
SHORTNESS OF BREATH: 0
FATIGUE: 0
DEPRESSION: 1
FEVER: 0
DIAPHORESIS: 0
NEUROLOGICAL NEGATIVE: 1
OCCASIONAL FEELINGS OF UNSTEADINESS: 1
LOSS OF SENSATION IN FEET: 0
CHILLS: 0
WOUND: 0

## 2024-12-17 ASSESSMENT — PAIN - FUNCTIONAL ASSESSMENT
PAIN_FUNCTIONAL_ASSESSMENT: 0-10

## 2024-12-17 ASSESSMENT — PATIENT HEALTH QUESTIONNAIRE - PHQ9
SUM OF ALL RESPONSES TO PHQ9 QUESTIONS 1 AND 2: 0
1. LITTLE INTEREST OR PLEASURE IN DOING THINGS: NOT AT ALL
2. FEELING DOWN, DEPRESSED OR HOPELESS: NOT AT ALL

## 2024-12-17 ASSESSMENT — PAIN SCALES - GENERAL
PAINLEVEL_OUTOF10: 0 - NO PAIN
PAINLEVEL_OUTOF10: 8
PAINLEVEL_OUTOF10: 0 - NO PAIN

## 2024-12-17 NOTE — H&P
"History Of Present Illness  Zhanna Perez \"Juan Alberto\" is a 89 y.o. female presenting with facet arthropathy, lumbosacral. Here for bilateral L4-L5, L5-S1 RFA under local. Last seen in pain clinic with Rosaura Ferro NP on 11/1/2024. Previous RFA on 12/2023 with Dr. Banks. Provided more than 50% relief for several months. Last dose of Eliquis 2 days ago. States pain has worsened bilaterally in lower back to a 8/10. Otherwise denies fever, chills, SoB, CP, n/v/d.     Past Medical History  Past Medical History:   Diagnosis Date    Age-related cognitive decline 03/31/2015    Age-related cognitive decline    Body mass index (BMI) 32.0-32.9, adult 10/28/2020    Body mass index (BMI) of 32.0 to 32.9 in adult    Bursitis of right shoulder 08/21/2018    Subdeltoid bursitis of right shoulder joint    Encounter for general adult medical examination without abnormal findings 04/01/2016    Medicare annual wellness visit, subsequent    Encounter for other preprocedural examination 08/30/2016    Pre-operative clearance    Erythematous condition, unspecified 05/09/2018    Breast erythema    Hyperlipidemia     Mastitis without abscess 05/09/2018    Cellulitis of breast    Mastodynia 05/09/2018    Breast tenderness    Mastodynia 05/09/2018    Pain of left breast    Obesity, unspecified 08/02/2021    Class 1 obesity with serious comorbidity and body mass index (BMI) of 33.0 to 33.9 in adult, unspecified obesity type    Obesity, unspecified 10/28/2020    Obesity (BMI 30-39.9)    Obesity, unspecified 09/15/2021    Class 1 obesity with serious comorbidity and body mass index (BMI) of 34.0 to 34.9 in adult, unspecified obesity type    Obesity, unspecified 05/03/2021    Class 1 obesity with serious comorbidity and body mass index (BMI) of 32.0 to 32.9 in adult, unspecified obesity type    Occipital neuralgia 05/12/2016    Bilateral occipital neuralgia    Other conditions influencing health status 01/28/2020    Uncontrolled type 2 diabetes " mellitus with complication, without long-term current use of insulin    Other conditions influencing health status 04/07/2017    Myalgia and myositis    Pain in right shoulder 07/05/2018    Pain in joint of right shoulder    Pain in unspecified hip 12/29/2014    Hip pain    Personal history of other diseases of the female genital tract 05/09/2018    History of breast swelling    Personal history of other diseases of the musculoskeletal system and connective tissue     History of tendinitis    Personal history of other diseases of the nervous system and sense organs 08/23/2017    History of sleep apnea    Personal history of other diseases of the nervous system and sense organs 03/27/2014    History of sleep apnea    Personal history of other diseases of the respiratory system 06/27/2016    History of acute bronchitis    Personal history of other diseases of the respiratory system 06/19/2020    History of acute sinusitis    Personal history of other infectious and parasitic diseases 02/25/2016    History of viral encephalitis    Trigger finger, right middle finger 03/19/2020    Trigger middle finger of right hand    Trochanteric bursitis, left hip 06/03/2019    Trochanteric bursitis of left hip    Type 2 diabetes mellitus with other skin complications 05/21/2018    Uncontrolled type 2 diabetes mellitus with other skin complication, unspecified long term insulin use status    Unilateral primary osteoarthritis, right knee 08/16/2017    Arthritis of knee, right    Urinary tract infection, site not specified 10/31/2020    Acute UTI       Surgical History  Past Surgical History:   Procedure Laterality Date    BREAST SURGERY  03/02/2015    Breast Surgery    EYE SURGERY  03/02/2015    Eye Surgery    JOINT REPLACEMENT Right     knee replacement    KNEE ARTHROSCOPY W/ DEBRIDEMENT  04/30/2013    Knee Arthroscopy (Therapeutic)    MR NECK ANGIO WO IV CONTRAST  02/26/2016    MR NECK ANGIO WO IV CONTRAST 2/26/2016 [facility]     OTHER SURGICAL HISTORY  04/30/2013    Supracervical Hysterectomy Laparoscopic Uterus 250g Or Less    TONSILLECTOMY  04/30/2013    Tonsillectomy        Social History  She reports that she quit smoking about 74 years ago. Her smoking use included cigarettes. She has been exposed to tobacco smoke. She has never used smokeless tobacco. She reports that she does not drink alcohol and does not use drugs.    Family History  Family History   Problem Relation Name Age of Onset    Brain cancer Father      Coronary artery disease Brother          Allergies  Codeine, Darvocet a500 [propoxyphene n-acetaminophen], Dilaudid [hydromorphone], Gabapentin, and Propoxyphene-acetaminophen    Current Outpatient Medications on File Prior to Encounter   Medication Sig Dispense Refill    acetaminophen (TYLENOL ORAL) Take by mouth.      alendronate (Fosamax) 70 mg tablet Take 1 tablet (70 mg) by mouth every 7 days. IN AM W/ 6-8OZ PLAIN WATER. DO NOT EAT/LIE DOWN FOR 30 MIN AFTER 12 tablet 1    ammonium lactate (Lac-Hydrin) 12 % lotion PLEASE APPLY TO BOTH FEET TWO TIMES PER DAY.      apixaban (Eliquis) 5 mg tablet Take 1 tablet (5 mg) by mouth 2 times a day. 180 tablet 1    cholecalciferol (Vitamin D-3) 25 MCG (1000 UT) capsule Take 1 capsule (25 mcg) by mouth once daily. 90 capsule 1    fluticasone (Flonase) 50 mcg/actuation nasal spray Administer 2 sprays into each nostril once daily. Shake gently. Before first use, prime pump. After use, clean tip and replace cap. 48 mL 1    folic acid (Folvite) 1 mg tablet Take 1 tablet (1 mg) by mouth once daily. 90 tablet 1    glimepiride (Amaryl) 2 mg tablet Take 1 tablet (2 mg) by mouth once daily in the morning. Take before meals. 90 tablet 1    insulin glargine (Lantus Solostar U-100 Insulin) 100 unit/mL (3 mL) pen Inject 10 Units under the skin once daily at bedtime. Take as directed per insulin instructions. 15 mL 1    levothyroxine (Synthroid, Levoxyl) 25 mcg tablet Take 1 tablet (25 mcg) by  "mouth once daily. 90 tablet 1    lisinopril 40 mg tablet Take 1 tablet (40 mg) by mouth once daily. 90 tablet 1    omeprazole (PriLOSEC) 20 mg DR capsule Take 1 capsule (20 mg) by mouth once daily in the morning. Take before meals. Do not crush or chew. 90 capsule 1    rosuvastatin (Crestor) 20 mg tablet Take 1 tablet (20 mg) by mouth once daily. 90 tablet 1    vit C/E/Zn/coppr/lutein/zeaxan (PRESERVISION AREDS-2 ORAL) Take 1 capsule by mouth 2 times a day.      blood pressure monitor kit Use to check BP daily and as needed 1 kit 0    blood sugar diagnostic (Premier Test Strip) strip TEST BLOOD SUGARonce daily. E11.9 100 each 1    DULoxetine (Cymbalta) 30 mg DR capsule Take 1 capsule (30 mg) by mouth once daily. 90 capsule 1    FreeStyle glucose monitoring kit 1 each if needed.      loperamide (Imodium) 2 mg capsule Take 1 capsule (2 mg) by mouth 4 times a day as needed for diarrhea. (Patient not taking: Reported on 12/17/2024)      magnesium hydroxide (Milk of Magnesia) 400 mg/5 mL suspension Take 30 mL by mouth once daily as needed for constipation. (Patient not taking: Reported on 12/17/2024)      meclizine (Antivert) 25 mg tablet Take 1 tablet (25 mg) by mouth 3 times a day as needed for dizziness. (Patient not taking: Reported on 12/17/2024) 30 tablet 1    ondansetron (Zofran) 4 mg tablet Take 1 tablet (4 mg) by mouth every 8 hours if needed for nausea or vomiting. (Patient not taking: Reported on 12/17/2024)      pen needle, diabetic (BD Ultra-Fine Mini Pen Needle) 31 gauge x 3/16\" needle INJECT 1 EACH UNDER THE SKIN ONCE DAILY. USE AS INSTRUCTED. USE TO INJECT INSULIN 100 each 1    tiZANidine (Zanaflex) 2 mg tablet TAKE 1 TABLET (2 MG) BY MOUTH 2 TIMES A DAY AS NEEDED FOR MUSCLE SPASMS. (Patient not taking: Reported on 12/17/2024) 60 tablet 0     No current facility-administered medications on file prior to encounter.      Review of Systems   Constitutional:  Negative for chills, diaphoresis, fatigue and " "fever.   HENT: Negative.     Respiratory:  Negative for shortness of breath.    Cardiovascular:  Negative for chest pain.   Musculoskeletal: Negative.    Skin:  Negative for pallor, rash and wound.   Neurological: Negative.         Physical Exam  Constitutional:       General: She is not in acute distress.     Appearance: Normal appearance. She is not toxic-appearing.   HENT:      Head: Normocephalic.   Cardiovascular:      Rate and Rhythm: Normal rate and regular rhythm.      Pulses: Normal pulses.      Heart sounds: Normal heart sounds.   Pulmonary:      Effort: Pulmonary effort is normal.      Breath sounds: Normal breath sounds.   Musculoskeletal:         General: Tenderness (Point tenderness bilateral lower back) present.   Skin:     General: Skin is warm and dry.   Neurological:      General: No focal deficit present.      Mental Status: She is alert and oriented to person, place, and time.   Psychiatric:         Mood and Affect: Mood normal.         Behavior: Behavior normal.          Last Recorded Vitals  Blood pressure 179/81, pulse 82, temperature 36.5 °C (97.7 °F), temperature source Temporal, resp. rate 17, height 1.422 m (4' 8\"), SpO2 97%.       Assessment/Plan   Assessment & Plan  Facet arthropathy, lumbosacral      RFA bilateral L4-L5, L5-S1       I spent 20 minutes in the professional and overall care of this patient.      Issac Rodriguez PA-C    "

## 2024-12-17 NOTE — DISCHARGE INSTRUCTIONS
Discharge Instructions:   ° Keep Band-Aid on for the next 24 hours.    ° No showering/bathing for the next 24 hours.    ° You may notice soreness or increased pain in the area of your injection, which may continue for the first 48 hours.    ° Avoid driving or operating any heavy machinery until the next day after the procedure.  ° You should resume any medications and your regular diet after the procedure.  ° You may resume regular daily activity but should avoid strenuous activity the day of the procedure.  Some of the side affects you may experience from the steroids are:  ° Insomnia (inability to sleep)  ° Increased sweating  ° Headaches  ° Increased fluid retention (swelling of your extremities)  ° Increase appetite  ° Face flushing  ° If you are a diabetic, your blood sugars may go up.  Closely monitor your diet.  Your blood sugar should return to normal in a few days.  Complications:   ° Complications are rare with the most common being temporary increase pain near the injection site. You can apply ice to affected area on the day of the procedure.   ° If the discomfort persists, apply moist heat to the area. Serious complications are very uncommon but may include bleeding, infection or nerve damage.   ° If severe pain, fever, redness or swelling near the injection site, have someone take you to the nearest emergency room to be evaluated for procedure complications or infection.  Expectations:   ° Local anesthetics wear off in several hours but duration of relief varies from individual to individual.     If you have any questions, please call the office at [phone].    If this is an emergency, call [phone] or go to your nearest hospital.

## 2024-12-17 NOTE — POST-PROCEDURE NOTE
"1045 pt has complaints of nausea, emesis bag provided  1049 Pt states she'd like ginger ale, pt denies pain  1053 pt states her stomach feels \"a little better\" after ginger ale. Pt now states she feels very tired. Dr Way aware of pt's complaints, orders rec'd to monitor pt for a longer time in admitting bay.  1054 pt stats her head is feeling \"big\", pt states she can't keep her eyes open. Pt transferred to admitting bay, report given  "

## 2024-12-17 NOTE — POST-PROCEDURE NOTE
"1100-Put pt on a cart, Saline lock put in left hand. [id number]. Feels dizzy and \"Head is big\". Oxygen via NC-2L.  1115-EKG done, Son at bedside. Per pt- \"I feel a little better, just tired.  Head still feels big. \" Neuro checks good. Pupils equal, hand grasps equal and strong. When arms are up- they are equal.   1119-500 ml LR running wide open.   1155- Pt more alert- per pt \"feeling better\". Drank some water. VSS. Room air for oxygen.  1206-Discharge instructions went over with pt and son.  1228- Pt discharged to [facility]- report called to [facility]. Son transported her to the facility. SL out.   "

## 2024-12-18 ASSESSMENT — PAIN SCALES - GENERAL: PAINLEVEL_OUTOF10: 0 - NO PAIN

## 2025-01-27 ENCOUNTER — TELEMEDICINE (OUTPATIENT)
Dept: PRIMARY CARE | Facility: CLINIC | Age: OVER 89
End: 2025-01-27
Payer: MEDICARE

## 2025-01-27 ENCOUNTER — LAB REQUISITION (OUTPATIENT)
Dept: LAB | Facility: HOSPITAL | Age: OVER 89
End: 2025-01-27

## 2025-01-27 ENCOUNTER — HOSPITAL ENCOUNTER (OUTPATIENT)
Dept: RADIOLOGY | Facility: HOSPITAL | Age: OVER 89
Discharge: HOME | End: 2025-01-27
Payer: MEDICARE

## 2025-01-27 DIAGNOSIS — R53.81 MALAISE AND FATIGUE: ICD-10-CM

## 2025-01-27 DIAGNOSIS — R53.83 MALAISE AND FATIGUE: Primary | ICD-10-CM

## 2025-01-27 DIAGNOSIS — R53.81 OTHER MALAISE: ICD-10-CM

## 2025-01-27 DIAGNOSIS — R53.81 MALAISE AND FATIGUE: Primary | ICD-10-CM

## 2025-01-27 DIAGNOSIS — R51.9 NONINTRACTABLE EPISODIC HEADACHE, UNSPECIFIED HEADACHE TYPE: ICD-10-CM

## 2025-01-27 DIAGNOSIS — R53.83 OTHER FATIGUE: ICD-10-CM

## 2025-01-27 DIAGNOSIS — R53.83 MALAISE AND FATIGUE: ICD-10-CM

## 2025-01-27 LAB
BASOPHILS # BLD AUTO: 0.03 X10*3/UL (ref 0–0.1)
BASOPHILS NFR BLD AUTO: 0.5 %
EOSINOPHIL # BLD AUTO: 0.2 X10*3/UL (ref 0–0.4)
EOSINOPHIL NFR BLD AUTO: 3.2 %
ERYTHROCYTE [DISTWIDTH] IN BLOOD BY AUTOMATED COUNT: 14.3 % (ref 11.5–14.5)
HCT VFR BLD AUTO: 31.7 % (ref 36–46)
HGB BLD-MCNC: 9.7 G/DL (ref 12–16)
IMM GRANULOCYTES # BLD AUTO: 0.02 X10*3/UL (ref 0–0.5)
IMM GRANULOCYTES NFR BLD AUTO: 0.3 % (ref 0–0.9)
LYMPHOCYTES # BLD AUTO: 1.85 X10*3/UL (ref 0.8–3)
LYMPHOCYTES NFR BLD AUTO: 29.6 %
MAGNESIUM SERPL-MCNC: 2.15 MG/DL (ref 1.6–2.4)
MCH RBC QN AUTO: 26.4 PG (ref 26–34)
MCHC RBC AUTO-ENTMCNC: 30.6 G/DL (ref 32–36)
MCV RBC AUTO: 86 FL (ref 80–100)
MONOCYTES # BLD AUTO: 0.78 X10*3/UL (ref 0.05–0.8)
MONOCYTES NFR BLD AUTO: 12.5 %
NEUTROPHILS # BLD AUTO: 3.36 X10*3/UL (ref 1.6–5.5)
NEUTROPHILS NFR BLD AUTO: 53.9 %
NRBC BLD-RTO: 0 /100 WBCS (ref 0–0)
PLATELET # BLD AUTO: 295 X10*3/UL (ref 150–450)
RBC # BLD AUTO: 3.67 X10*6/UL (ref 4–5.2)
WBC # BLD AUTO: 6.2 X10*3/UL (ref 4.4–11.3)

## 2025-01-27 PROCEDURE — 1160F RVW MEDS BY RX/DR IN RCRD: CPT | Performed by: PHYSICIAN ASSISTANT

## 2025-01-27 PROCEDURE — 71046 X-RAY EXAM CHEST 2 VIEWS: CPT

## 2025-01-27 PROCEDURE — 1159F MED LIST DOCD IN RCRD: CPT | Performed by: PHYSICIAN ASSISTANT

## 2025-01-27 PROCEDURE — G2211 COMPLEX E/M VISIT ADD ON: HCPCS | Performed by: PHYSICIAN ASSISTANT

## 2025-01-27 PROCEDURE — 83735 ASSAY OF MAGNESIUM: CPT

## 2025-01-27 PROCEDURE — 1123F ACP DISCUSS/DSCN MKR DOCD: CPT | Performed by: PHYSICIAN ASSISTANT

## 2025-01-27 PROCEDURE — 85025 COMPLETE CBC W/AUTO DIFF WBC: CPT | Mod: OUT | Performed by: PHYSICIAN ASSISTANT

## 2025-01-27 PROCEDURE — 83735 ASSAY OF MAGNESIUM: CPT | Mod: OUT | Performed by: PHYSICIAN ASSISTANT

## 2025-01-27 PROCEDURE — 85025 COMPLETE CBC W/AUTO DIFF WBC: CPT

## 2025-01-27 PROCEDURE — 71046 X-RAY EXAM CHEST 2 VIEWS: CPT | Performed by: RADIOLOGY

## 2025-01-27 PROCEDURE — 1036F TOBACCO NON-USER: CPT | Performed by: PHYSICIAN ASSISTANT

## 2025-01-27 PROCEDURE — 99214 OFFICE O/P EST MOD 30 MIN: CPT | Performed by: PHYSICIAN ASSISTANT

## 2025-01-27 NOTE — PROGRESS NOTES
An interactive audio and video telecommunication system which permits real time communications between the patient (at the originating site) and provider (at the distant site) was utilized to provide this telehealth service.   Verbal consent was requested and obtained from Zhanna Perez on this date, 01/27/25 for a telehealth visit.     Subjective    Zhanna Perez is a 89 y.o. year old female patient presenting for virtual visit   Chief Complaint   Patient presents with    Fatigue     Fatigue and malaise [id number]      Zhanna presents virtually with 3 weeks of fatigue and malaise.  The first week she was in bed for a full week and felt very tired.   Headache, fatigue, poor appetite. Better last week, worse again this week.   Ongoing symptoms x 3 weeks. NKI. No falls. Daughter is there with her now. Unsure if she's drinking enough water. Unclear if she's urinating enough, or perhaps too often, but in minimal amounts. Denies cough or sinus pressure    Patient Active Problem List   Diagnosis    Allergic rhinitis    Arthritis of both hands    Arthritis of knee, left    Arthritis of shoulder    Blindness    BPV (benign positional vertigo)    Cervical disc disorder with radiculopathy    Cervical disc herniation    Cervical neuritis    Depression    Depression, major, in remission (CMS-[facility])    HTN (hypertension)    Diabetes mellitus (Multi)    Hypothyroidism, adult    Involuntary movements    Joint pain in fingers of both hands    Atypical chest pain    Left ankle pain    Left hip pain    Left knee pain    Chronic bilateral low back pain without sciatica    Lumbar radiculitis    Facet arthropathy, lumbosacral    Macular degeneration    Hypercholesterolemia    Hypertriglyceridemia    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)    Nontoxic multinodular goiter    Osteopenia    Osteoporosis    Pulmonary embolism, bilateral (Multi)    Leg swelling    TIA (transient ischemic attack)    Unsteadiness    Urinary symptom or sign    Vitamin  B12 deficiency    Vitamin D deficiency    Wedge deformity on x-ray of spine    Lumbar spinal stenosis    Right lumbar radiculopathy    Type 2 diabetes mellitus with hyperglycemia, with long-term current use of insulin    Sacroiliitis (CMS-[facility])    Scalp laceration    Exudative age-related macular degeneration, bilateral, with active choroidal neovascularization    Myofascial pain       Past Medical History:   Diagnosis Date    Age-related cognitive decline 03/31/2015    Age-related cognitive decline    Body mass index (BMI) 32.0-32.9, adult 10/28/2020    Body mass index (BMI) of 32.0 to 32.9 in adult    Bursitis of right shoulder 08/21/2018    Subdeltoid bursitis of right shoulder joint    Encounter for general adult medical examination without abnormal findings 04/01/2016    Medicare annual wellness visit, subsequent    Encounter for other preprocedural examination 08/30/2016    Pre-operative clearance    Erythematous condition, unspecified 05/09/2018    Breast erythema    Hyperlipidemia     Mastitis without abscess 05/09/2018    Cellulitis of breast    Mastodynia 05/09/2018    Breast tenderness    Mastodynia 05/09/2018    Pain of left breast    Obesity, unspecified 08/02/2021    Class 1 obesity with serious comorbidity and body mass index (BMI) of 33.0 to 33.9 in adult, unspecified obesity type    Obesity, unspecified 10/28/2020    Obesity (BMI 30-39.9)    Obesity, unspecified 09/15/2021    Class 1 obesity with serious comorbidity and body mass index (BMI) of 34.0 to 34.9 in adult, unspecified obesity type    Obesity, unspecified 05/03/2021    Class 1 obesity with serious comorbidity and body mass index (BMI) of 32.0 to 32.9 in adult, unspecified obesity type    Occipital neuralgia 05/12/2016    Bilateral occipital neuralgia    Other conditions influencing health status 01/28/2020    Uncontrolled type 2 diabetes mellitus with complication, without long-term current use of insulin    Other conditions influencing  health status 04/07/2017    Myalgia and myositis    Pain in right shoulder 07/05/2018    Pain in joint of right shoulder    Pain in unspecified hip 12/29/2014    Hip pain    Personal history of other diseases of the female genital tract 05/09/2018    History of breast swelling    Personal history of other diseases of the musculoskeletal system and connective tissue     History of tendinitis    Personal history of other diseases of the nervous system and sense organs 08/23/2017    History of sleep apnea    Personal history of other diseases of the nervous system and sense organs 03/27/2014    History of sleep apnea    Personal history of other diseases of the respiratory system 06/27/2016    History of acute bronchitis    Personal history of other diseases of the respiratory system 06/19/2020    History of acute sinusitis    Personal history of other infectious and parasitic diseases 02/25/2016    History of viral encephalitis    Trigger finger, right middle finger 03/19/2020    Trigger middle finger of right hand    Trochanteric bursitis, left hip 06/03/2019    Trochanteric bursitis of left hip    Type 2 diabetes mellitus with other skin complications 05/21/2018    Uncontrolled type 2 diabetes mellitus with other skin complication, unspecified long term insulin use status    Unilateral primary osteoarthritis, right knee 08/16/2017    Arthritis of knee, right    Urinary tract infection, site not specified 10/31/2020    Acute UTI      Past Surgical History:   Procedure Laterality Date    BREAST SURGERY  03/02/2015    Breast Surgery    EYE SURGERY  03/02/2015    Eye Surgery    JOINT REPLACEMENT Right     knee replacement    KNEE ARTHROSCOPY W/ DEBRIDEMENT  04/30/2013    Knee Arthroscopy (Therapeutic)    MR NECK ANGIO WO IV CONTRAST  02/26/2016    MR NECK ANGIO WO IV CONTRAST 2/26/2016 [facility]    OTHER SURGICAL HISTORY  04/30/2013    Supracervical Hysterectomy Laparoscopic Uterus 250g Or Less    TONSILLECTOMY   2013    Tonsillectomy      Family History   Problem Relation Name Age of Onset    Brain cancer Father      Coronary artery disease Brother        Social History     Tobacco Use    Smoking status: Former     Current packs/day: 0.00     Types: Cigarettes     Quit date:      Years since quittin.1     Passive exposure: Past    Smokeless tobacco: Never   Substance Use Topics    Alcohol use: Never        Current Outpatient Medications:     acetaminophen (TYLENOL ORAL), Take by mouth., Disp: , Rfl:     alendronate (Fosamax) 70 mg tablet, Take 1 tablet (70 mg) by mouth every 7 days. IN AM W/ 6-8OZ PLAIN WATER. DO NOT EAT/LIE DOWN FOR 30 MIN AFTER, Disp: 12 tablet, Rfl: 1    ammonium lactate (Lac-Hydrin) 12 % lotion, PLEASE APPLY TO BOTH FEET TWO TIMES PER DAY., Disp: , Rfl:     apixaban (Eliquis) 5 mg tablet, Take 1 tablet (5 mg) by mouth 2 times a day., Disp: 180 tablet, Rfl: 1    blood pressure monitor kit, Use to check BP daily and as needed, Disp: 1 kit, Rfl: 0    blood sugar diagnostic (Premier Test Strip) strip, TEST BLOOD SUGARonce daily. E11.9, Disp: 100 each, Rfl: 1    cholecalciferol (Vitamin D-3) 25 MCG (1000 UT) capsule, Take 1 capsule (25 mcg) by mouth once daily., Disp: 90 capsule, Rfl: 1    DULoxetine (Cymbalta) 30 mg DR capsule, Take 1 capsule (30 mg) by mouth once daily., Disp: 90 capsule, Rfl: 1    fluticasone (Flonase) 50 mcg/actuation nasal spray, Administer 2 sprays into each nostril once daily. Shake gently. Before first use, prime pump. After use, clean tip and replace cap., Disp: 48 mL, Rfl: 1    folic acid (Folvite) 1 mg tablet, Take 1 tablet (1 mg) by mouth once daily., Disp: 90 tablet, Rfl: 1    FreeStyle glucose monitoring kit, 1 each if needed., Disp: , Rfl:     glimepiride (Amaryl) 2 mg tablet, Take 1 tablet (2 mg) by mouth once daily in the morning. Take before meals., Disp: 90 tablet, Rfl: 1    insulin glargine (Lantus Solostar U-100 Insulin) 100 unit/mL (3 mL) pen, Inject 10  "Units under the skin once daily at bedtime. Take as directed per insulin instructions., Disp: 15 mL, Rfl: 1    levothyroxine (Synthroid, Levoxyl) 25 mcg tablet, Take 1 tablet (25 mcg) by mouth once daily., Disp: 90 tablet, Rfl: 1    lisinopril 40 mg tablet, Take 1 tablet (40 mg) by mouth once daily., Disp: 90 tablet, Rfl: 1    loperamide (Imodium) 2 mg capsule, Take 1 capsule (2 mg) by mouth 4 times a day as needed for diarrhea. (Patient not taking: Reported on 12/17/2024), Disp: , Rfl:     magnesium hydroxide (Milk of Magnesia) 400 mg/5 mL suspension, Take 30 mL by mouth once daily as needed for constipation. (Patient not taking: Reported on 12/17/2024), Disp: , Rfl:     meclizine (Antivert) 25 mg tablet, Take 1 tablet (25 mg) by mouth 3 times a day as needed for dizziness. (Patient not taking: Reported on 12/17/2024), Disp: 30 tablet, Rfl: 1    omeprazole (PriLOSEC) 20 mg DR capsule, Take 1 capsule (20 mg) by mouth once daily in the morning. Take before meals. Do not crush or chew., Disp: 90 capsule, Rfl: 1    pen needle, diabetic (BD Ultra-Fine Mini Pen Needle) 31 gauge x 3/16\" needle, INJECT 1 EACH UNDER THE SKIN ONCE DAILY. USE AS INSTRUCTED. USE TO INJECT INSULIN, Disp: 100 each, Rfl: 1    rosuvastatin (Crestor) 20 mg tablet, Take 1 tablet (20 mg) by mouth once daily., Disp: 90 tablet, Rfl: 1    tiZANidine (Zanaflex) 2 mg tablet, TAKE 1 TABLET (2 MG) BY MOUTH 2 TIMES A DAY AS NEEDED FOR MUSCLE SPASMS. (Patient not taking: Reported on 12/17/2024), Disp: 60 tablet, Rfl: 0    vit C/E/Zn/coppr/lutein/zeaxan (PRESERVISION AREDS-2 ORAL), Take 1 capsule by mouth 2 times a day., Disp: , Rfl:      Review of Systems    Review of Systems:   Constitutional: Denies fever  HEENT: Denies ST, earache  CVS: Denies Chest pain  Pulmonary: Denies wheezing, SOB  GI: Denies N/V  : Denies dysuria  Musculoskeletal:  Denies myalgia  Neuro: Denies focal weakness or numbness.  Skin: Denies Rashes.  *Review of Systems is negative " unless otherwise mentioned in HPI or ROS above.     Objective    VITALS  Pt does not have vitals available at time of visit.    Exam       Limited physical exam in virtual platform. Sitting up right in a chair. Conversational  Nontoxic. No acute distress.  Nonlabored breathing. No conversational dyspnea.    Assessment/Plan      Problem List Items Addressed This Visit    None  Visit Diagnoses       Malaise and fatigue    -  Primary    Relevant Orders    CBC and Auto Differential    Comprehensive Metabolic Panel    Magnesium    Urinalysis with Reflex Culture and Microscopic    XR chest 2 views    Nonintractable episodic headache, unspecified headache type

## 2025-01-28 DIAGNOSIS — D50.9 IRON DEFICIENCY ANEMIA, UNSPECIFIED IRON DEFICIENCY ANEMIA TYPE: Primary | ICD-10-CM

## 2025-01-28 RX ORDER — FERROUS SULFATE 325(65) MG
1 TABLET, DELAYED RELEASE (ENTERIC COATED) ORAL
Qty: 90 TABLET | Refills: 3 | Status: SHIPPED | OUTPATIENT
Start: 2025-01-28 | End: 2026-01-28

## 2025-01-29 NOTE — PROGRESS NOTES
Subjective     HPI   Zhanan Perez is a 89 y.o. year old female patient with presenting to clinic with concern for   Chief Complaint   Patient presents with    stool check       H&H dropping from last month.6 weeks ago on 12/9 10.4 & 34%, now 1/27 9.7 and 31.7%.  Guiac negative in clinic for occult blood  . Urine collected today for possible UTI.    3 weeks of fatigue and malaise.  Was seen virtually 3 days ago.  The first week she was in bed for a full week and felt very tired.   Headache, fatigue, poor appetite. Better last week, worse again this week.   Ongoing symptoms x 3 weeks. NKI. No falls. Daughter is there with her now. Unsure if she's drinking enough water. Unclear if she's urinating enough, or perhaps too often, but in minimal amounts. Denies cough or sinus pressure  No sign of infections, but blood counts are down a bit. I'd like to see Juan Alberto in office to see if she is losing blood in her stool. Please start a daily iron supplement in the morning with breakfast.     CXR    Kate Garsia PA-C  1/28/2025 12:52 PM EST       No pneumonia. Thryoid nodules are known. Ultrasound done 2014. Biopsy done 4/2015     IMPRESSION:  1. Likely atelectasis and/or linear scarring the left lung base.  2. Paratracheal soft tissues likely related to enlarged thyroid  gland. Correlate with thyroid ultrasound as clinically warranted.      Patient Active Problem List   Diagnosis    Allergic rhinitis    Arthritis of both hands    Arthritis of knee, left    Arthritis of shoulder    Blindness    BPV (benign positional vertigo)    Cervical disc disorder with radiculopathy    Cervical disc herniation    Cervical neuritis    Depression    Depression, major, in remission (CMS-HCC)    HTN (hypertension)    Diabetes mellitus (Multi)    Hypothyroidism, adult    Involuntary movements    Joint pain in fingers of both hands    Atypical chest pain    Left ankle pain    Left hip pain    Left knee pain    Chronic bilateral low back pain  without sciatica    Lumbar radiculitis    Facet arthropathy, lumbosacral    Macular degeneration    Hypercholesterolemia    Hypertriglyceridemia    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)    Nontoxic multinodular goiter    Osteopenia    Osteoporosis    Pulmonary embolism, bilateral (Multi)    Leg swelling    TIA (transient ischemic attack)    Unsteadiness    Urinary symptom or sign    Vitamin B12 deficiency    Vitamin D deficiency    Wedge deformity on x-ray of spine    Lumbar spinal stenosis    Right lumbar radiculopathy    Type 2 diabetes mellitus with hyperglycemia, with long-term current use of insulin    Sacroiliitis (CMS-[facility])    Scalp laceration    Exudative age-related macular degeneration, bilateral, with active choroidal neovascularization    Myofascial pain       Past Medical History:   Diagnosis Date    Age-related cognitive decline 03/31/2015    Age-related cognitive decline    Body mass index (BMI) 32.0-32.9, adult 10/28/2020    Body mass index (BMI) of 32.0 to 32.9 in adult    Bursitis of right shoulder 08/21/2018    Subdeltoid bursitis of right shoulder joint    Encounter for general adult medical examination without abnormal findings 04/01/2016    Medicare annual wellness visit, subsequent    Encounter for other preprocedural examination 08/30/2016    Pre-operative clearance    Erythematous condition, unspecified 05/09/2018    Breast erythema    Hyperlipidemia     Mastitis without abscess 05/09/2018    Cellulitis of breast    Mastodynia 05/09/2018    Breast tenderness    Mastodynia 05/09/2018    Pain of left breast    Obesity, unspecified 08/02/2021    Class 1 obesity with serious comorbidity and body mass index (BMI) of 33.0 to 33.9 in adult, unspecified obesity type    Obesity, unspecified 10/28/2020    Obesity (BMI 30-39.9)    Obesity, unspecified 09/15/2021    Class 1 obesity with serious comorbidity and body mass index (BMI) of 34.0 to 34.9 in adult, unspecified obesity type    Obesity, unspecified  05/03/2021    Class 1 obesity with serious comorbidity and body mass index (BMI) of 32.0 to 32.9 in adult, unspecified obesity type    Occipital neuralgia 05/12/2016    Bilateral occipital neuralgia    Other conditions influencing health status 01/28/2020    Uncontrolled type 2 diabetes mellitus with complication, without long-term current use of insulin    Other conditions influencing health status 04/07/2017    Myalgia and myositis    Pain in right shoulder 07/05/2018    Pain in joint of right shoulder    Pain in unspecified hip 12/29/2014    Hip pain    Personal history of other diseases of the female genital tract 05/09/2018    History of breast swelling    Personal history of other diseases of the musculoskeletal system and connective tissue     History of tendinitis    Personal history of other diseases of the nervous system and sense organs 08/23/2017    History of sleep apnea    Personal history of other diseases of the nervous system and sense organs 03/27/2014    History of sleep apnea    Personal history of other diseases of the respiratory system 06/27/2016    History of acute bronchitis    Personal history of other diseases of the respiratory system 06/19/2020    History of acute sinusitis    Personal history of other infectious and parasitic diseases 02/25/2016    History of viral encephalitis    Trigger finger, right middle finger 03/19/2020    Trigger middle finger of right hand    Trochanteric bursitis, left hip 06/03/2019    Trochanteric bursitis of left hip    Type 2 diabetes mellitus with other skin complications 05/21/2018    Uncontrolled type 2 diabetes mellitus with other skin complication, unspecified long term insulin use status    Unilateral primary osteoarthritis, right knee 08/16/2017    Arthritis of knee, right    Urinary tract infection, site not specified 10/31/2020    Acute UTI      Past Surgical History:   Procedure Laterality Date    BREAST SURGERY  03/02/2015    Breast Surgery     EYE SURGERY  2015    Eye Surgery    JOINT REPLACEMENT Right     knee replacement    KNEE ARTHROSCOPY W/ DEBRIDEMENT  2013    Knee Arthroscopy (Therapeutic)    MR NECK ANGIO WO IV CONTRAST  2016    MR NECK ANGIO WO IV CONTRAST 2016 [facility]    OTHER SURGICAL HISTORY  2013    Supracervical Hysterectomy Laparoscopic Uterus 250g Or Less    TONSILLECTOMY  2013    Tonsillectomy      Family History   Problem Relation Name Age of Onset    Brain cancer Father      Coronary artery disease Brother        Social History     Tobacco Use    Smoking status: Former     Current packs/day: 0.00     Types: Cigarettes     Quit date:      Years since quittin.1     Passive exposure: Past    Smokeless tobacco: Never   Substance Use Topics    Alcohol use: Never        Current Outpatient Medications:     acetaminophen (TYLENOL ORAL), Take by mouth., Disp: , Rfl:     alendronate (Fosamax) 70 mg tablet, Take 1 tablet (70 mg) by mouth every 7 days. IN AM W/ 6-8OZ PLAIN WATER. DO NOT EAT/LIE DOWN FOR 30 MIN AFTER, Disp: 12 tablet, Rfl: 1    ammonium lactate (Lac-Hydrin) 12 % lotion, PLEASE APPLY TO BOTH FEET TWO TIMES PER DAY., Disp: , Rfl:     apixaban (Eliquis) 5 mg tablet, Take 1 tablet (5 mg) by mouth 2 times a day., Disp: 180 tablet, Rfl: 1    blood pressure monitor kit, Use to check BP daily and as needed, Disp: 1 kit, Rfl: 0    blood sugar diagnostic (Premier Test Strip) strip, TEST BLOOD SUGARonce daily. E11.9, Disp: 100 each, Rfl: 1    cholecalciferol (Vitamin D-3) 25 MCG (1000 UT) capsule, Take 1 capsule (25 mcg) by mouth once daily., Disp: 90 capsule, Rfl: 1    DULoxetine (Cymbalta) 30 mg DR capsule, Take 1 capsule (30 mg) by mouth once daily., Disp: 90 capsule, Rfl: 1    ferrous sulfate 325 (65 Fe) MG EC tablet, Take 1 tablet by mouth once daily with breakfast. Do not crush, chew, or split., Disp: 90 tablet, Rfl: 3    fluticasone (Flonase) 50 mcg/actuation nasal spray, Administer 2  "sprays into each nostril once daily. Shake gently. Before first use, prime pump. After use, clean tip and replace cap., Disp: 48 mL, Rfl: 1    folic acid (Folvite) 1 mg tablet, Take 1 tablet (1 mg) by mouth once daily., Disp: 90 tablet, Rfl: 1    FreeStyle glucose monitoring kit, 1 each if needed., Disp: , Rfl:     glimepiride (Amaryl) 2 mg tablet, Take 1 tablet (2 mg) by mouth once daily in the morning. Take before meals., Disp: 90 tablet, Rfl: 1    insulin glargine (Lantus Solostar U-100 Insulin) 100 unit/mL (3 mL) pen, Inject 10 Units under the skin once daily at bedtime. Take as directed per insulin instructions., Disp: 15 mL, Rfl: 1    levothyroxine (Synthroid, Levoxyl) 25 mcg tablet, Take 1 tablet (25 mcg) by mouth once daily., Disp: 90 tablet, Rfl: 1    lisinopril 40 mg tablet, Take 1 tablet (40 mg) by mouth once daily., Disp: 90 tablet, Rfl: 1    loperamide (Imodium) 2 mg capsule, Take 1 capsule (2 mg) by mouth 4 times a day as needed for diarrhea., Disp: , Rfl:     magnesium hydroxide (Milk of Magnesia) 400 mg/5 mL suspension, Take 30 mL by mouth once daily as needed for constipation., Disp: , Rfl:     meclizine (Antivert) 25 mg tablet, Take 1 tablet (25 mg) by mouth 3 times a day as needed for dizziness., Disp: 30 tablet, Rfl: 1    omeprazole (PriLOSEC) 20 mg DR capsule, Take 1 capsule (20 mg) by mouth once daily in the morning. Take before meals. Do not crush or chew., Disp: 90 capsule, Rfl: 1    pen needle, diabetic (BD Ultra-Fine Mini Pen Needle) 31 gauge x 3/16\" needle, INJECT 1 EACH UNDER THE SKIN ONCE DAILY. USE AS INSTRUCTED. USE TO INJECT INSULIN, Disp: 100 each, Rfl: 1    rosuvastatin (Crestor) 20 mg tablet, Take 1 tablet (20 mg) by mouth once daily., Disp: 90 tablet, Rfl: 1    tiZANidine (Zanaflex) 2 mg tablet, TAKE 1 TABLET (2 MG) BY MOUTH 2 TIMES A DAY AS NEEDED FOR MUSCLE SPASMS., Disp: 60 tablet, Rfl: 0    vit C/E/Zn/coppr/lutein/zeaxan (PRESERVISION AREDS-2 ORAL), Take 1 capsule by mouth 2 " "times a day., Disp: , Rfl:     ferrous gluconate (Fergon) 324 (38 Fe) mg tablet, Take 1 tablet (38 mg of iron) by mouth once daily with breakfast., Disp: 90 tablet, Rfl: 3    nitrofurantoin, macrocrystal-monohydrate, (Macrobid) 100 mg capsule, Take 1 capsule (100 mg) by mouth 2 times a day for 5 days., Disp: 10 capsule, Rfl: 0     Review of Systems  Constitutional: Denies fever  HEENT: Denies ST, earache  CVS: Denies Chest pain  Pulmonary: Denies wheezing, SOB  GI: Denies N/V  : Denies dysuria  Musculoskeletal:  Denies myalgia  Neuro: Denies focal weakness or numbness.  Skin: Denies Rashes.  *Review of Systems is negative unless otherwise mentioned in HPI or ROS above.    Objective   /68   Pulse 93   Temp 36.7 °C (98 °F)   Ht 1.422 m (4' 8\")   Wt 70.4 kg (155 lb 3.2 oz)   SpO2 96%   BMI 34.80 kg/m²  reviewed Body mass index is 34.8 kg/m².     Physical Exam  Constitutional: NAD.  Resting comfortably.  Head: Atraumatic, normocephalic.  ENT: Moist oral mucosa. Nasal mucosa wnl.   Cardiac: Regular rate & rhythm.   Pulmonary: Lungs clear bilat  GI: Soft, Nontender, nondistended.   Musculoskeletal: No peripheral edema.   Skin: No evidence of trauma. No rashes  Psych: Intact judgement and insight.    .Assessment/Plan   Problem List Items Addressed This Visit             ICD-10-CM    Urinary symptom or sign R39.9    Relevant Orders    POCT UA Automated manually resulted    Urine Culture     Other Visit Diagnoses         Codes    Iron deficiency anemia, unspecified iron deficiency anemia type    -  Primary D50.9    Relevant Medications    ferrous gluconate (Fergon) 324 (38 Fe) mg tablet    Acute cystitis without hematuria     N30.00    Relevant Medications    nitrofurantoin, macrocrystal-monohydrate, (Macrobid) 100 mg capsule          Guiac negative in office  Recent Results (from the past 2 hours)   POCT UA Automated manually resulted    Collection Time: 01/30/25  3:46 PM   Result Value Ref Range    POC Color, " Urine Straw Straw, Yellow, Light-Yellow    POC Appearance, Urine Clear Clear    POC Glucose, Urine NEGATIVE NEGATIVE mg/dl    POC Bilirubin, Urine NEGATIVE NEGATIVE    POC Ketones, Urine NEGATIVE NEGATIVE mg/dl    POC Specific Gravity, Urine 1.015 1.005 - 1.035    POC Blood, Urine NEGATIVE NEGATIVE    POC PH, Urine 6.0 No Reference Range Established PH    POC Protein, Urine NEGATIVE NEGATIVE mg/dl    POC Urobilinogen, Urine 0.2 0.2, 1.0 EU/DL    Poc Nitrite, Urine NEGATIVE NEGATIVE    POC Leukocytes, Urine SMALL (1+) (A) NEGATIVE

## 2025-01-30 ENCOUNTER — OFFICE VISIT (OUTPATIENT)
Dept: PRIMARY CARE | Facility: CLINIC | Age: OVER 89
End: 2025-01-30
Payer: MEDICARE

## 2025-01-30 VITALS
BODY MASS INDEX: 34.91 KG/M2 | SYSTOLIC BLOOD PRESSURE: 143 MMHG | HEART RATE: 93 BPM | HEIGHT: 56 IN | WEIGHT: 155.2 LBS | TEMPERATURE: 98 F | DIASTOLIC BLOOD PRESSURE: 68 MMHG | OXYGEN SATURATION: 96 %

## 2025-01-30 DIAGNOSIS — R39.9 URINARY SYMPTOM OR SIGN: ICD-10-CM

## 2025-01-30 DIAGNOSIS — D50.8 IRON DEFICIENCY ANEMIA SECONDARY TO INADEQUATE DIETARY IRON INTAKE: Primary | ICD-10-CM

## 2025-01-30 DIAGNOSIS — H35.3231 EXUDATIVE AGE-RELATED MACULAR DEGENERATION, BILATERAL, WITH ACTIVE CHOROIDAL NEOVASCULARIZATION: ICD-10-CM

## 2025-01-30 DIAGNOSIS — N30.00 ACUTE CYSTITIS WITHOUT HEMATURIA: ICD-10-CM

## 2025-01-30 LAB
POC APPEARANCE, URINE: CLEAR
POC BILIRUBIN, URINE: NEGATIVE
POC BLOOD, URINE: NEGATIVE
POC COLOR, URINE: ABNORMAL
POC GLUCOSE, URINE: NEGATIVE MG/DL
POC KETONES, URINE: NEGATIVE MG/DL
POC LEUKOCYTES, URINE: ABNORMAL
POC NITRITE,URINE: NEGATIVE
POC PH, URINE: 6 PH
POC PROTEIN, URINE: NEGATIVE MG/DL
POC SPECIFIC GRAVITY, URINE: 1.01
POC UROBILINOGEN, URINE: 0.2 EU/DL

## 2025-01-30 PROCEDURE — 81003 URINALYSIS AUTO W/O SCOPE: CPT | Performed by: PHYSICIAN ASSISTANT

## 2025-01-30 PROCEDURE — 1123F ACP DISCUSS/DSCN MKR DOCD: CPT | Performed by: PHYSICIAN ASSISTANT

## 2025-01-30 PROCEDURE — 1159F MED LIST DOCD IN RCRD: CPT | Performed by: PHYSICIAN ASSISTANT

## 2025-01-30 PROCEDURE — 82272 OCCULT BLD FECES 1-3 TESTS: CPT | Performed by: PHYSICIAN ASSISTANT

## 2025-01-30 PROCEDURE — 1160F RVW MEDS BY RX/DR IN RCRD: CPT | Performed by: PHYSICIAN ASSISTANT

## 2025-01-30 PROCEDURE — 99213 OFFICE O/P EST LOW 20 MIN: CPT | Performed by: PHYSICIAN ASSISTANT

## 2025-01-30 PROCEDURE — 1036F TOBACCO NON-USER: CPT | Performed by: PHYSICIAN ASSISTANT

## 2025-01-30 PROCEDURE — 3078F DIAST BP <80 MM HG: CPT | Performed by: PHYSICIAN ASSISTANT

## 2025-01-30 PROCEDURE — 3077F SYST BP >= 140 MM HG: CPT | Performed by: PHYSICIAN ASSISTANT

## 2025-01-30 RX ORDER — FERROUS GLUCONATE 325 MG
38 TABLET ORAL
Qty: 90 TABLET | Refills: 3 | Status: SHIPPED | OUTPATIENT
Start: 2025-01-30 | End: 2026-01-30

## 2025-01-30 RX ORDER — NITROFURANTOIN 25; 75 MG/1; MG/1
100 CAPSULE ORAL 2 TIMES DAILY
Qty: 10 CAPSULE | Refills: 0 | Status: SHIPPED | OUTPATIENT
Start: 2025-01-30 | End: 2025-02-04

## 2025-01-30 NOTE — LETTER
Zhanna Perez  8/24/1932  MRN [id number]    [facility]:    Medication list changes:    Dx UTI  Start macrobid 100mg bid x 5 days.     Dx iron deficiency anemia  Start ferrous gluconate 38mg 1 tablet daily with breakfast.     Please feel free to call the office if you have any questions or concerns.       Kate Garsia PA-C, [facility]  [address] [place] [address]    Phone [phone] ext.[phone]  Fax [phone]

## 2025-02-01 LAB — BACTERIA UR CULT: NORMAL

## 2025-02-03 DIAGNOSIS — E55.9 VITAMIN D DEFICIENCY: ICD-10-CM

## 2025-02-03 RX ORDER — VIT C/E/ZN/COPPR/LUTEIN/ZEAXAN 250MG-90MG
25 CAPSULE ORAL DAILY
Qty: 90 CAPSULE | Refills: 3 | Status: SHIPPED | OUTPATIENT
Start: 2025-02-03

## 2025-02-08 ENCOUNTER — PATIENT MESSAGE (OUTPATIENT)
Dept: PRIMARY CARE | Facility: CLINIC | Age: OVER 89
End: 2025-02-08
Payer: MEDICARE

## 2025-02-08 DIAGNOSIS — I10 PRIMARY HYPERTENSION: ICD-10-CM

## 2025-02-08 RX ORDER — LISINOPRIL 40 MG/1
40 TABLET ORAL DAILY
Qty: 90 TABLET | Refills: 0 | Status: SHIPPED | OUTPATIENT
Start: 2025-02-08

## 2025-02-11 LAB — HEMOCCULT STL QL IA: NORMAL

## 2025-02-28 ENCOUNTER — OFFICE VISIT (OUTPATIENT)
Dept: PAIN MEDICINE | Facility: HOSPITAL | Age: OVER 89
End: 2025-02-28
Payer: MEDICARE

## 2025-02-28 VITALS
RESPIRATION RATE: 15 BRPM | DIASTOLIC BLOOD PRESSURE: 71 MMHG | HEART RATE: 89 BPM | WEIGHT: 148 LBS | TEMPERATURE: 98.6 F | OXYGEN SATURATION: 95 % | HEIGHT: 58 IN | BODY MASS INDEX: 31.07 KG/M2 | SYSTOLIC BLOOD PRESSURE: 133 MMHG

## 2025-02-28 DIAGNOSIS — M47.817 FACET ARTHROPATHY, LUMBOSACRAL: ICD-10-CM

## 2025-02-28 DIAGNOSIS — M46.1 SACROILIITIS (CMS-HCC): Primary | ICD-10-CM

## 2025-02-28 DIAGNOSIS — Z79.899 MEDICATION MANAGEMENT: ICD-10-CM

## 2025-02-28 PROCEDURE — 1036F TOBACCO NON-USER: CPT | Performed by: NURSE PRACTITIONER

## 2025-02-28 PROCEDURE — 99214 OFFICE O/P EST MOD 30 MIN: CPT | Performed by: NURSE PRACTITIONER

## 2025-02-28 PROCEDURE — 3078F DIAST BP <80 MM HG: CPT | Performed by: NURSE PRACTITIONER

## 2025-02-28 PROCEDURE — 1160F RVW MEDS BY RX/DR IN RCRD: CPT | Performed by: NURSE PRACTITIONER

## 2025-02-28 PROCEDURE — 1159F MED LIST DOCD IN RCRD: CPT | Performed by: NURSE PRACTITIONER

## 2025-02-28 PROCEDURE — 3075F SYST BP GE 130 - 139MM HG: CPT | Performed by: NURSE PRACTITIONER

## 2025-02-28 PROCEDURE — 1125F AMNT PAIN NOTED PAIN PRSNT: CPT | Performed by: NURSE PRACTITIONER

## 2025-02-28 PROCEDURE — 1123F ACP DISCUSS/DSCN MKR DOCD: CPT | Performed by: NURSE PRACTITIONER

## 2025-02-28 ASSESSMENT — ENCOUNTER SYMPTOMS
CARDIOVASCULAR NEGATIVE: 1
NECK PAIN: 0
CONSTITUTIONAL NEGATIVE: 1
NEUROLOGICAL NEGATIVE: 1
GASTROINTESTINAL NEGATIVE: 1
PSYCHIATRIC NEGATIVE: 1
ARTHRALGIAS: 1
JOINT SWELLING: 0
EYES NEGATIVE: 1
ENDOCRINE NEGATIVE: 1
RESPIRATORY NEGATIVE: 1
MYALGIAS: 1
ALLERGIC/IMMUNOLOGIC NEGATIVE: 1
BACK PAIN: 1
NECK STIFFNESS: 0

## 2025-02-28 ASSESSMENT — PAIN SCALES - GENERAL: PAINLEVEL_OUTOF10: 5

## 2025-02-28 NOTE — H&P (VIEW-ONLY)
"History Of Present Illness  Zhanna Perez \"Juan Alberto\" is a pleasant 89 y.o. female who is here for postprocedure follow-up.  Patient presents in a wheelchair.  She arrives with her daughter.  Patient lives at an assisted living, [facility].  She ambulates at her home with the use of her rollator.    Patient had bilateral L4-L5, L5-S1 RFA on 12/17/2024.  The procedure has improved her back pain.  She reports it provided 80% relief.  She is still feeling better however she continues to have lower back pain. The procedure has improved her pain and her ability to participate in her daily activities.     Patient continues to have chronic lower back pain.  She rates her pain as 5-6 out of 10.  It is constant.  She describes her pain as throbbing.  She reports lower back pain is aggravated with walking.  She reports she is unable to walk without her cane or her walker.  She denies pain, numbness or tingling sensation to her legs.  She denies increasing leg weakness or change in balance.  She denies bowel or bladder incontinence.  She denies recent falls, injuries, or ER visits.  There has been no changes since she was last seen.    Patient is only taking Tylenol for pain relief.  She is on duloxetine prescribed by her provider.  She does not want to take other medications.  She continues to do her physical therapy at her home in [facility].     I reviewed previous notes and imaging.  I discussed the plan of care including pharmacologic and joint interventional procedure.  Patient's pain is more at the SI joints with the right worse than the left.  Patient had right SI RFA on 12/11/2023.  She reports that it gives her more than 50% relief that lasted for a year.  She is agreeable for a repeat of the right SI RFA.  The RFA is a thermal nonpulsed or thermal continuous ablation.  I also discussed therapeutic left SI intra-articular joint injection and she is in agreement to proceed to this procedure as well.  " The procedures are done under fluoroscopy.  Questions were answered during this encounter.    The patient was counseled regarding diagnostic results, instructions for management, risk factor reductions, risks and benefits of treatment options and importance of compliance with treatment.    -------------  PROCEDURES:    12/17/2024: Bilateral L4-L5, L5-S1 RFA  10/1/2024: Left L4-L5, L5-S1 diagnostic MBB #2  8/23/2024: Left L4-L5, L5-S1 diagnostic MBB #1  6/14/2024: Bilateral L2-L3, L3-L4 RFA  5/14/2024: Bilateral L2-L3, L3-L4 diagnostic MBB #2  4/2/2024: Bilateral L2-L3, L3-L4 diagnostic MBB #1  1/30/2024: Bilateral SI joint injection  12/11/2023: Right L4-L5, L5-S1 RFA  8/25/2023: Right SI joint injection  7/18/2023: Left L5 and S1 TFESI  12/7/2022: Left L3-L4, right L4-L5 TFESI with Dr. Banks  9/16/2022: Right L4-L5, L5-S1 RFA with Dr. Banks  ---------------    OARRS:  PANDA Javier-CNP, DNP on 2/28/2025 10:07 AM  I have personally reviewed the OARRS report for Zhanna Perez. I have considered the risks of abuse, dependence, addiction and diversion    Past Medical History  She has a past medical history of Age-related cognitive decline (03/31/2015), Body mass index (BMI) 32.0-32.9, adult (10/28/2020), Bursitis of right shoulder (08/21/2018), Encounter for general adult medical examination without abnormal findings (04/01/2016), Encounter for other preprocedural examination (08/30/2016), Erythematous condition, unspecified (05/09/2018), Hyperlipidemia, Mastitis without abscess (05/09/2018), Mastodynia (05/09/2018), Mastodynia (05/09/2018), Obesity, unspecified (08/02/2021), Obesity, unspecified (10/28/2020), Obesity, unspecified (09/15/2021), Obesity, unspecified (05/03/2021), Occipital neuralgia (05/12/2016), Other conditions influencing health status (01/28/2020), Other conditions influencing health status (04/07/2017), Pain in right shoulder (07/05/2018), Pain in unspecified hip (12/29/2014), Personal  history of other diseases of the female genital tract (05/09/2018), Personal history of other diseases of the musculoskeletal system and connective tissue, Personal history of other diseases of the nervous system and sense organs (08/23/2017), Personal history of other diseases of the nervous system and sense organs (03/27/2014), Personal history of other diseases of the respiratory system (06/27/2016), Personal history of other diseases of the respiratory system (06/19/2020), Personal history of other infectious and parasitic diseases (02/25/2016), Trigger finger, right middle finger (03/19/2020), Trochanteric bursitis, left hip (06/03/2019), 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    Surgical History  Past Surgical History:   Procedure Laterality Date    BREAST SURGERY  03/02/2015    Breast Surgery    EYE SURGERY  03/02/2015    Eye Surgery    JOINT REPLACEMENT Right     knee replacement    KNEE ARTHROSCOPY W/ DEBRIDEMENT  04/30/2013    Knee Arthroscopy (Therapeutic)    MR NECK ANGIO WO IV CONTRAST  02/26/2016    MR NECK ANGIO WO IV CONTRAST 2/26/2016 [facility]    OTHER SURGICAL HISTORY  04/30/2013    Supracervical Hysterectomy Laparoscopic Uterus 250g Or Less    TONSILLECTOMY  04/30/2013    Tonsillectomy        Social History  She reports that she quit smoking about 74 years ago. Her smoking use included cigarettes. She has been exposed to tobacco smoke. She has never used smokeless tobacco. She reports that she does not drink alcohol and does not use drugs.    Family History  Family History   Problem Relation Name Age of Onset    Brain cancer Father      Coronary artery disease Brother          Allergies  Codeine, Darvocet a500 [propoxyphene n-acetaminophen], Dilaudid [hydromorphone], Gabapentin, and Propoxyphene-acetaminophen    Medications    Current  Outpatient Medications:     acetaminophen (TYLENOL ORAL), Take by mouth., Disp: , Rfl:     alendronate (Fosamax) 70 mg tablet, Take 1 tablet (70 mg) by mouth every 7 days. IN AM W/ 6-8OZ PLAIN WATER. DO NOT EAT/LIE DOWN FOR 30 MIN AFTER, Disp: 12 tablet, Rfl: 1    ammonium lactate (Lac-Hydrin) 12 % lotion, PLEASE APPLY TO BOTH FEET TWO TIMES PER DAY., Disp: , Rfl:     apixaban (Eliquis) 5 mg tablet, Take 1 tablet (5 mg) by mouth 2 times a day., Disp: 180 tablet, Rfl: 1    blood pressure monitor kit, Use to check BP daily and as needed, Disp: 1 kit, Rfl: 0    blood sugar diagnostic (Premier Test Strip) strip, TEST BLOOD SUGARonce daily. E11.9, Disp: 100 each, Rfl: 1    cholecalciferol (Vitamin D-3) 25 MCG (1000 UT) capsule, Take 1 capsule (25 mcg) by mouth once daily., Disp: 90 capsule, Rfl: 3    DULoxetine (Cymbalta) 30 mg DR capsule, Take 1 capsule (30 mg) by mouth once daily., Disp: 90 capsule, Rfl: 1    ferrous gluconate (Fergon) 324 (38 Fe) mg tablet, Take 1 tablet (38 mg of iron) by mouth once daily with breakfast., Disp: 90 tablet, Rfl: 3    ferrous sulfate 325 (65 Fe) MG EC tablet, Take 1 tablet by mouth once daily with breakfast. Do not crush, chew, or split., Disp: 90 tablet, Rfl: 3    fluticasone (Flonase) 50 mcg/actuation nasal spray, Administer 2 sprays into each nostril once daily. Shake gently. Before first use, prime pump. After use, clean tip and replace cap., Disp: 48 mL, Rfl: 1    folic acid (Folvite) 1 mg tablet, Take 1 tablet (1 mg) by mouth once daily., Disp: 90 tablet, Rfl: 1    FreeStyle glucose monitoring kit, 1 each if needed., Disp: , Rfl:     glimepiride (Amaryl) 2 mg tablet, Take 1 tablet (2 mg) by mouth once daily in the morning. Take before meals., Disp: 90 tablet, Rfl: 1    insulin glargine (Lantus Solostar U-100 Insulin) 100 unit/mL (3 mL) pen, Inject 10 Units under the skin once daily at bedtime. Take as directed per insulin instructions., Disp: 15 mL, Rfl: 1    levothyroxine  "(Synthroid, Levoxyl) 25 mcg tablet, Take 1 tablet (25 mcg) by mouth once daily., Disp: 90 tablet, Rfl: 1    lisinopril 40 mg tablet, Take 1 tablet (40 mg) by mouth once daily., Disp: 90 tablet, Rfl: 0    loperamide (Imodium) 2 mg capsule, Take 1 capsule (2 mg) by mouth 4 times a day as needed for diarrhea., Disp: , Rfl:     magnesium hydroxide (Milk of Magnesia) 400 mg/5 mL suspension, Take 30 mL by mouth once daily as needed for constipation., Disp: , Rfl:     meclizine (Antivert) 25 mg tablet, Take 1 tablet (25 mg) by mouth 3 times a day as needed for dizziness., Disp: 30 tablet, Rfl: 1    omeprazole (PriLOSEC) 20 mg DR capsule, Take 1 capsule (20 mg) by mouth once daily in the morning. Take before meals. Do not crush or chew., Disp: 90 capsule, Rfl: 1    pen needle, diabetic (BD Ultra-Fine Mini Pen Needle) 31 gauge x 3/16\" needle, INJECT 1 EACH UNDER THE SKIN ONCE DAILY. USE AS INSTRUCTED. USE TO INJECT INSULIN, Disp: 100 each, Rfl: 1    rosuvastatin (Crestor) 20 mg tablet, Take 1 tablet (20 mg) by mouth once daily., Disp: 90 tablet, Rfl: 1    tiZANidine (Zanaflex) 2 mg tablet, TAKE 1 TABLET (2 MG) BY MOUTH 2 TIMES A DAY AS NEEDED FOR MUSCLE SPASMS., Disp: 60 tablet, Rfl: 0    vit C/E/Zn/coppr/lutein/zeaxan (PRESERVISION AREDS-2 ORAL), Take 1 capsule by mouth 2 times a day., Disp: , Rfl:      Review of Systems   Constitutional: Negative.    HENT: Negative.     Eyes: Negative.    Respiratory: Negative.     Cardiovascular: Negative.    Gastrointestinal: Negative.    Endocrine: Negative.    Genitourinary: Negative.    Musculoskeletal:  Positive for arthralgias, back pain and myalgias. Negative for gait problem, joint swelling, neck pain and neck stiffness.   Skin: Negative.    Allergic/Immunologic: Negative.    Neurological: Negative.    Psychiatric/Behavioral: Negative.          Physical Exam  Vitals and nursing note reviewed.   HENT:      Head: Normocephalic.      Nose: Nose normal.   Eyes:      Extraocular " "Movements: Extraocular movements intact.      Conjunctiva/sclera: Conjunctivae normal.      Pupils: Pupils are equal, round, and reactive to light.   Cardiovascular:      Rate and Rhythm: Normal rate and regular rhythm.   Pulmonary:      Effort: Pulmonary effort is normal.      Breath sounds: Normal breath sounds.   Genitourinary:     Comments: Deferred  Musculoskeletal:         General: Tenderness present. No swelling, deformity or signs of injury.      Cervical back: No rigidity or tenderness.      Right lower leg: No edema.      Left lower leg: No edema.      Comments: \"For full disclosure, patient was asked to pull up their garment to properly visualize the spine. This is done by the patient without me touching their garment.  The spine/joints were examined using gloves.\"    Negative leg raise.  Positive for Fabere's test eliciting back pain with the right worse than the left.  Positive for diffuse tenderness on palpation of the right SI joint.  Positive for moderate tenderness on palpation in the left SI joint.  Negative for paraspinal tenderness at the lumbar region.  No radicular symptoms.  Positive for provocative test including Fabere's, compression and distraction with the right worse than the left  BUE 4/4, BLE 4/4.   Skin:     General: Skin is warm and dry.   Neurological:      General: No focal deficit present.      Mental Status: She is alert and oriented to person, place, and time.   Psychiatric:         Mood and Affect: Mood normal.         Behavior: Behavior normal.          Last Recorded Vitals  /71 (BP Location: Left arm, Patient Position: Sitting, BP Cuff Size: Adult)   Pulse 89   Temp 37 °C (98.6 °F) (Temporal)   Resp 15   Wt 67.1 kg (148 lb)   SpO2 95%  Body mass index is 30.93 kg/m².       Pain Management Panel  More data exists         Latest Ref Rng & Units 8/29/2024 6/5/2024   Pain Management Panel   Amphetamine Screen, Urine Presumptive Negative Presumptive Negative  Presumptive " Negative    Barbiturate Screen, Urine Presumptive Negative Presumptive Negative  Presumptive Negative    Codeine IgE <50 ng/mL <50  <50    Hydromorphone Urine <25 ng/mL <25  <25    Morphine  <50 ng/mL <50  <50           Relevant Results    Narrative & Impression   MRN: [id number]  Patient Name: PADMINI WATT     STUDY:  MRI L-SPINE WO;  7/12/2022 4:18 pm     INDICATION:  Right sided lower back pain. Lumbosacral spondylosis.     COMPARISON:  Lumbar spine radiograph 06/09/2022     ACCESSION NUMBER(S):  [id number]     ORDERING CLINICIAN:  ZACARIAS CROCKER     TECHNIQUE:  Sagittal T1, T2, STIR, axial T1 and T2 weighted images of the lumbar  spine were acquired.     FINDINGS:  Alignment: Mild straightening of the lumbar lordosis. Moderate  levoscoliosis centered at L3-L4.  Grade 1 anterolisthesis of L2 on L3  and L3 on L4.     Vertebrae/Intervertebral Discs: Vertebral body heights are  maintained.The marrow signal is within normal limits. Multilevel loss  of disc spaces most prominent at L2-L3 and L3-L4.     Conus: The lower thoracic cord appears unremarkable. The conus  terminates at L1-L2. Redundancy of the nerve roots at L3-L4 secondary  to severe canal narrowing..     Multilevel degenerative changes of the lumbar spine including  endplate remodeling, facet degenerative changes, disc bulges and  ligamentum flavum hypertrophy.     T12-L1:  Small disc bulge and facet degenerative changes without  canal stenosis. Neural foramina are patent.     L1-2:  Disc bulge slightly asymmetric to the left, facet degenerative  changes and mild ligamentum flavum hypertrophy with flattening of the  ventral thecal sac. No significant canal stenosis. Neural foramina  are patent.     L2-3:  Symmetric disc bulge, ligamentum flavum hypertrophy and facet  degenerative changes with moderate canal stenosis. Right subarticular  recess narrowing. Right greater than left moderate neural foraminal  narrowing with abutment of the exiting right L2 nerve  root.     L3-4:  Disc bulge asymmetric to the right, marked facet degenerative  changes and ligamentum flavum hypertrophy with severe canal stenosis.  Bilateral subarticular recess narrowing with abutment of the  traversing L4 nerve roots. Severe bilateral neural foraminal  narrowing with mild mass effect on the exiting L3 nerve roots.     L4-5:  Disc bulge, facet degenerative changes and ligamentum flavum  hypertrophy with severe canal stenosis. Bilateral subarticular recess  narrowing. Left-greater-than-right severe neural foraminal narrowing  with mass effect on the exiting left L4 nerve root.     L5-S1:  Disc bulge, moderate facet degenerative change and ligamentum  flavum hypertrophy without canal stenosis. Moderate bilateral neural  foraminal narrowing, greater on the left with abutment of the exiting  L5 nerve roots bilaterally.     The prevertebral and posterior paraspinous soft tissues are  unremarkable.  1.3 cm left interpolar region simple cyst.     IMPRESSION:  1.  Advanced multilevel degenerative changes of the lumbar spine with  up to severe canal stenosis and neural foraminal narrowing most  prominent at L3-L4 and L4-L5.  2. Varying degrees of multilevel abutment and mass effect on the  exiting nerve roots as described above.  3. Redundancy of the nerve roots at L3-L4 secondary to severe canal  narrowing and disc bulge.  4. Multilevel subarticular recess narrowing, most prominent at L3-L4  and L4-L5.   --------------       Media Information           Assessment/Plan   Problem List Items Addressed This Visit             ICD-10-CM    Facet arthropathy, lumbosacral M47.817    Sacroiliitis (CMS-[facility]) - Primary M46.1    Relevant Orders    FL pain management    Radiofrequency Ablation    FL pain management    Sacroiliac Joint Injection     Other Visit Diagnoses         Codes    Medication management     Z79.899    Relevant Orders    QUEST MISCELLANEOUS TEST (ROOM TEMPERATURE)              Plan/Follow-up  Instructions:   Continue with your prescribed medications.    ---------------    Your next appointment is with Dr. Way in:    [facility]    For pain block/injection on: 3/25/2025, right sacroiliac radiofrequency ablation    LOCAL    Stop Eliquis for 2 days     °You will receive a phone call the weekday before your appointment/procedure.   °Please KEEP your scheduled appointments.     °BRING your photo ID, insurance information, and a correct list of your home medications to EVERY visit.    °Please call our office at [phone] if you have any questions.     Antibiotic: You will receive Cipro before the procedure due to your history of right total knee replacement  ---------------    ---------------    Your next appointment is with Dr. Way in:    [facility]    For pain block/injection on: Left sacroiliac intra-articular joint injection, 4/15/2025    LOCAL    Stop Eliquis for 2 days     °You will receive a phone call the weekday before your appointment/procedure.   °Please KEEP your scheduled appointments.     °BRING your photo ID, insurance information, and a correct list of your home medications to EVERY visit.    °Please call our office at [phone] if you have any questions.     You will then be seen for a postprocedure follow-up in 8 weeks.    Antibiotic: You will receive Cipro before the procedure due to your history of right total knee replacement.  ---------------      Time     Prep time on date of the patient encounter: 2  Time spent directly with patient/family/caregiver: 30   Documentation time: 2  Total time on date of patient encounter: 34      Rosaura Ferro, LIZBETH, APRN, FNP-C    [facility]  Office #: [phone]  Fax # [phone]    ---------------------  Disclaimer: This note was created using voice recognition software. It was not corrected for typographical or grammatical errors, inadvertent word insertion, or any unintended errors.  Please feel free to contact me for clarification.  -----------------

## 2025-02-28 NOTE — PATIENT INSTRUCTIONS
Continue with your prescribed medications.    ---------------    Your next appointment is with Dr. Way in:    [facility]    For pain block/injection on: 3/25/2025, right sacroiliac radiofrequency ablation    LOCAL    Stop Eliquis for 2 days     °You will receive a phone call the weekday before your appointment/procedure.   °Please KEEP your scheduled appointments.     °BRING your photo ID, insurance information, and a correct list of your home medications to EVERY visit.    °Please call our office at [phone] if you have any questions.     Antibiotic: You will receive Cipro before the procedure due to your history of right total knee replacement  ---------------    ---------------    Your next appointment is with Dr. Way in:    [facility]    For pain block/injection on: Left sacroiliac intra-articular joint injection, 4/15/2025    LOCAL    Stop Eliquis for 2 days     °You will receive a phone call the weekday before your appointment/procedure.   °Please KEEP your scheduled appointments.     °BRING your photo ID, insurance information, and a correct list of your home medications to EVERY visit.    °Please call our office at [phone] if you have any questions.     You will then be seen for a postprocedure follow-up in 8 weeks.    Antibiotic: You will receive Cipro before the procedure due to your history of right total knee replacement.  ---------------

## 2025-02-28 NOTE — PROGRESS NOTES
Last urine drug screening date/ordered today: 8/29/2024.  Updated today     Results of last screen: As expected      Last opioid risk screening date/ordered today: 8/30/2024      Pain Scale Screening:   Pain Assessment and Documentation Tool (PADT)   Date of Assessment: 2/28/2025  Analgesia:   Patient reports her pain level on average during the past week is 5on a 0 - 10 scale.   Patient reports that her pain level at its worst during the past week was 8 on a 0 -10 scale.   70% of pain has been relieved during the past week per patient   Patient states that the amount of pain relief she is now obtaining from her current pain reliever(s) is enough to make a real difference in her life.   Query to clinician: Is the patient's pain relief clinically significant? yes  Activities of Daily Living:   Physical functioning: unchanged  Family relationships: unchanged  Social relationships: unchanged  Mood: unchanged  Sleep patterns: better  Overall functioning: unchanged  Adverse Events: No, Zhanna Perez is not experiencing side effects from current pain reliever.  Patients overall severity of side effect:none  Specific Analgesic Plan: Continue present regimen.

## 2025-02-28 NOTE — PROGRESS NOTES
"History Of Present Illness  Zhanna Perez \"Juan Alberto\" is a pleasant 89 y.o. female who is here for postprocedure follow-up.  Patient presents in a wheelchair.  She arrives with her daughter.  Patient lives at an assisted living, [facility].  She ambulates at her home with the use of her rollator.    Patient had bilateral L4-L5, L5-S1 RFA on 12/17/2024.  The procedure has improved her back pain.  She reports it provided 80% relief.  She is still feeling better however she continues to have lower back pain. The procedure has improved her pain and her ability to participate in her daily activities.     Patient continues to have chronic lower back pain.  She rates her pain as 5-6 out of 10.  It is constant.  She describes her pain as throbbing.  She reports lower back pain is aggravated with walking.  She reports she is unable to walk without her cane or her walker.  She denies pain, numbness or tingling sensation to her legs.  She denies increasing leg weakness or change in balance.  She denies bowel or bladder incontinence.  She denies recent falls, injuries, or ER visits.  There has been no changes since she was last seen.    Patient is only taking Tylenol for pain relief.  She is on duloxetine prescribed by her provider.  She does not want to take other medications.  She continues to do her physical therapy at her home in [facility].     I reviewed previous notes and imaging.  I discussed the plan of care including pharmacologic and joint interventional procedure.  Patient's pain is more at the SI joints with the right worse than the left.  Patient had right SI RFA on 12/11/2023.  She reports that it gives her more than 50% relief that lasted for a year.  She is agreeable for a repeat of the right SI RFA.  The RFA is a thermal nonpulsed or thermal continuous ablation.  I also discussed therapeutic left SI intra-articular joint injection and she is in agreement to proceed to this procedure as well.  " The procedures are done under fluoroscopy.  Questions were answered during this encounter.    The patient was counseled regarding diagnostic results, instructions for management, risk factor reductions, risks and benefits of treatment options and importance of compliance with treatment.    -------------  PROCEDURES:    12/17/2024: Bilateral L4-L5, L5-S1 RFA  10/1/2024: Left L4-L5, L5-S1 diagnostic MBB #2  8/23/2024: Left L4-L5, L5-S1 diagnostic MBB #1  6/14/2024: Bilateral L2-L3, L3-L4 RFA  5/14/2024: Bilateral L2-L3, L3-L4 diagnostic MBB #2  4/2/2024: Bilateral L2-L3, L3-L4 diagnostic MBB #1  1/30/2024: Bilateral SI joint injection  12/11/2023: Right L4-L5, L5-S1 RFA  8/25/2023: Right SI joint injection  7/18/2023: Left L5 and S1 TFESI  12/7/2022: Left L3-L4, right L4-L5 TFESI with Dr. Banks  9/16/2022: Right L4-L5, L5-S1 RFA with Dr. Banks  ---------------    OARRS:  PANDA Javier-CNP, DNP on 2/28/2025 10:07 AM  I have personally reviewed the OARRS report for Zhanna Perez. I have considered the risks of abuse, dependence, addiction and diversion    Past Medical History  She has a past medical history of Age-related cognitive decline (03/31/2015), Body mass index (BMI) 32.0-32.9, adult (10/28/2020), Bursitis of right shoulder (08/21/2018), Encounter for general adult medical examination without abnormal findings (04/01/2016), Encounter for other preprocedural examination (08/30/2016), Erythematous condition, unspecified (05/09/2018), Hyperlipidemia, Mastitis without abscess (05/09/2018), Mastodynia (05/09/2018), Mastodynia (05/09/2018), Obesity, unspecified (08/02/2021), Obesity, unspecified (10/28/2020), Obesity, unspecified (09/15/2021), Obesity, unspecified (05/03/2021), Occipital neuralgia (05/12/2016), Other conditions influencing health status (01/28/2020), Other conditions influencing health status (04/07/2017), Pain in right shoulder (07/05/2018), Pain in unspecified hip (12/29/2014), Personal  history of other diseases of the female genital tract (05/09/2018), Personal history of other diseases of the musculoskeletal system and connective tissue, Personal history of other diseases of the nervous system and sense organs (08/23/2017), Personal history of other diseases of the nervous system and sense organs (03/27/2014), Personal history of other diseases of the respiratory system (06/27/2016), Personal history of other diseases of the respiratory system (06/19/2020), Personal history of other infectious and parasitic diseases (02/25/2016), Trigger finger, right middle finger (03/19/2020), Trochanteric bursitis, left hip (06/03/2019), 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    Surgical History  Past Surgical History:   Procedure Laterality Date    BREAST SURGERY  03/02/2015    Breast Surgery    EYE SURGERY  03/02/2015    Eye Surgery    JOINT REPLACEMENT Right     knee replacement    KNEE ARTHROSCOPY W/ DEBRIDEMENT  04/30/2013    Knee Arthroscopy (Therapeutic)    MR NECK ANGIO WO IV CONTRAST  02/26/2016    MR NECK ANGIO WO IV CONTRAST 2/26/2016 [facility]    OTHER SURGICAL HISTORY  04/30/2013    Supracervical Hysterectomy Laparoscopic Uterus 250g Or Less    TONSILLECTOMY  04/30/2013    Tonsillectomy        Social History  She reports that she quit smoking about 74 years ago. Her smoking use included cigarettes. She has been exposed to tobacco smoke. She has never used smokeless tobacco. She reports that she does not drink alcohol and does not use drugs.    Family History  Family History   Problem Relation Name Age of Onset    Brain cancer Father      Coronary artery disease Brother          Allergies  Codeine, Darvocet a500 [propoxyphene n-acetaminophen], Dilaudid [hydromorphone], Gabapentin, and Propoxyphene-acetaminophen    Medications    Current  Outpatient Medications:     acetaminophen (TYLENOL ORAL), Take by mouth., Disp: , Rfl:     alendronate (Fosamax) 70 mg tablet, Take 1 tablet (70 mg) by mouth every 7 days. IN AM W/ 6-8OZ PLAIN WATER. DO NOT EAT/LIE DOWN FOR 30 MIN AFTER, Disp: 12 tablet, Rfl: 1    ammonium lactate (Lac-Hydrin) 12 % lotion, PLEASE APPLY TO BOTH FEET TWO TIMES PER DAY., Disp: , Rfl:     apixaban (Eliquis) 5 mg tablet, Take 1 tablet (5 mg) by mouth 2 times a day., Disp: 180 tablet, Rfl: 1    blood pressure monitor kit, Use to check BP daily and as needed, Disp: 1 kit, Rfl: 0    blood sugar diagnostic (Premier Test Strip) strip, TEST BLOOD SUGARonce daily. E11.9, Disp: 100 each, Rfl: 1    cholecalciferol (Vitamin D-3) 25 MCG (1000 UT) capsule, Take 1 capsule (25 mcg) by mouth once daily., Disp: 90 capsule, Rfl: 3    DULoxetine (Cymbalta) 30 mg DR capsule, Take 1 capsule (30 mg) by mouth once daily., Disp: 90 capsule, Rfl: 1    ferrous gluconate (Fergon) 324 (38 Fe) mg tablet, Take 1 tablet (38 mg of iron) by mouth once daily with breakfast., Disp: 90 tablet, Rfl: 3    ferrous sulfate 325 (65 Fe) MG EC tablet, Take 1 tablet by mouth once daily with breakfast. Do not crush, chew, or split., Disp: 90 tablet, Rfl: 3    fluticasone (Flonase) 50 mcg/actuation nasal spray, Administer 2 sprays into each nostril once daily. Shake gently. Before first use, prime pump. After use, clean tip and replace cap., Disp: 48 mL, Rfl: 1    folic acid (Folvite) 1 mg tablet, Take 1 tablet (1 mg) by mouth once daily., Disp: 90 tablet, Rfl: 1    FreeStyle glucose monitoring kit, 1 each if needed., Disp: , Rfl:     glimepiride (Amaryl) 2 mg tablet, Take 1 tablet (2 mg) by mouth once daily in the morning. Take before meals., Disp: 90 tablet, Rfl: 1    insulin glargine (Lantus Solostar U-100 Insulin) 100 unit/mL (3 mL) pen, Inject 10 Units under the skin once daily at bedtime. Take as directed per insulin instructions., Disp: 15 mL, Rfl: 1    levothyroxine  "(Synthroid, Levoxyl) 25 mcg tablet, Take 1 tablet (25 mcg) by mouth once daily., Disp: 90 tablet, Rfl: 1    lisinopril 40 mg tablet, Take 1 tablet (40 mg) by mouth once daily., Disp: 90 tablet, Rfl: 0    loperamide (Imodium) 2 mg capsule, Take 1 capsule (2 mg) by mouth 4 times a day as needed for diarrhea., Disp: , Rfl:     magnesium hydroxide (Milk of Magnesia) 400 mg/5 mL suspension, Take 30 mL by mouth once daily as needed for constipation., Disp: , Rfl:     meclizine (Antivert) 25 mg tablet, Take 1 tablet (25 mg) by mouth 3 times a day as needed for dizziness., Disp: 30 tablet, Rfl: 1    omeprazole (PriLOSEC) 20 mg DR capsule, Take 1 capsule (20 mg) by mouth once daily in the morning. Take before meals. Do not crush or chew., Disp: 90 capsule, Rfl: 1    pen needle, diabetic (BD Ultra-Fine Mini Pen Needle) 31 gauge x 3/16\" needle, INJECT 1 EACH UNDER THE SKIN ONCE DAILY. USE AS INSTRUCTED. USE TO INJECT INSULIN, Disp: 100 each, Rfl: 1    rosuvastatin (Crestor) 20 mg tablet, Take 1 tablet (20 mg) by mouth once daily., Disp: 90 tablet, Rfl: 1    tiZANidine (Zanaflex) 2 mg tablet, TAKE 1 TABLET (2 MG) BY MOUTH 2 TIMES A DAY AS NEEDED FOR MUSCLE SPASMS., Disp: 60 tablet, Rfl: 0    vit C/E/Zn/coppr/lutein/zeaxan (PRESERVISION AREDS-2 ORAL), Take 1 capsule by mouth 2 times a day., Disp: , Rfl:      Review of Systems   Constitutional: Negative.    HENT: Negative.     Eyes: Negative.    Respiratory: Negative.     Cardiovascular: Negative.    Gastrointestinal: Negative.    Endocrine: Negative.    Genitourinary: Negative.    Musculoskeletal:  Positive for arthralgias, back pain and myalgias. Negative for gait problem, joint swelling, neck pain and neck stiffness.   Skin: Negative.    Allergic/Immunologic: Negative.    Neurological: Negative.    Psychiatric/Behavioral: Negative.          Physical Exam  Vitals and nursing note reviewed.   HENT:      Head: Normocephalic.      Nose: Nose normal.   Eyes:      Extraocular " "Movements: Extraocular movements intact.      Conjunctiva/sclera: Conjunctivae normal.      Pupils: Pupils are equal, round, and reactive to light.   Cardiovascular:      Rate and Rhythm: Normal rate and regular rhythm.   Pulmonary:      Effort: Pulmonary effort is normal.      Breath sounds: Normal breath sounds.   Genitourinary:     Comments: Deferred  Musculoskeletal:         General: Tenderness present. No swelling, deformity or signs of injury.      Cervical back: No rigidity or tenderness.      Right lower leg: No edema.      Left lower leg: No edema.      Comments: \"For full disclosure, patient was asked to pull up their garment to properly visualize the spine. This is done by the patient without me touching their garment.  The spine/joints were examined using gloves.\"    Negative leg raise.  Positive for Fabere's test eliciting back pain with the right worse than the left.  Positive for diffuse tenderness on palpation of the right SI joint.  Positive for moderate tenderness on palpation in the left SI joint.  Negative for paraspinal tenderness at the lumbar region.  No radicular symptoms.  Positive for provocative test including Fabere's, compression and distraction with the right worse than the left  BUE 4/4, BLE 4/4.   Skin:     General: Skin is warm and dry.   Neurological:      General: No focal deficit present.      Mental Status: She is alert and oriented to person, place, and time.   Psychiatric:         Mood and Affect: Mood normal.         Behavior: Behavior normal.          Last Recorded Vitals  /71 (BP Location: Left arm, Patient Position: Sitting, BP Cuff Size: Adult)   Pulse 89   Temp 37 °C (98.6 °F) (Temporal)   Resp 15   Wt 67.1 kg (148 lb)   SpO2 95%  Body mass index is 30.93 kg/m².       Pain Management Panel  More data exists         Latest Ref Rng & Units 8/29/2024 6/5/2024   Pain Management Panel   Amphetamine Screen, Urine Presumptive Negative Presumptive Negative  Presumptive " Negative    Barbiturate Screen, Urine Presumptive Negative Presumptive Negative  Presumptive Negative    Codeine IgE <50 ng/mL <50  <50    Hydromorphone Urine <25 ng/mL <25  <25    Morphine  <50 ng/mL <50  <50           Relevant Results    Narrative & Impression   MRN: [id number]  Patient Name: PADMINI WATT     STUDY:  MRI L-SPINE WO;  7/12/2022 4:18 pm     INDICATION:  Right sided lower back pain. Lumbosacral spondylosis.     COMPARISON:  Lumbar spine radiograph 06/09/2022     ACCESSION NUMBER(S):  [id number]     ORDERING CLINICIAN:  ZACARIAS CROCKER     TECHNIQUE:  Sagittal T1, T2, STIR, axial T1 and T2 weighted images of the lumbar  spine were acquired.     FINDINGS:  Alignment: Mild straightening of the lumbar lordosis. Moderate  levoscoliosis centered at L3-L4.  Grade 1 anterolisthesis of L2 on L3  and L3 on L4.     Vertebrae/Intervertebral Discs: Vertebral body heights are  maintained.The marrow signal is within normal limits. Multilevel loss  of disc spaces most prominent at L2-L3 and L3-L4.     Conus: The lower thoracic cord appears unremarkable. The conus  terminates at L1-L2. Redundancy of the nerve roots at L3-L4 secondary  to severe canal narrowing..     Multilevel degenerative changes of the lumbar spine including  endplate remodeling, facet degenerative changes, disc bulges and  ligamentum flavum hypertrophy.     T12-L1:  Small disc bulge and facet degenerative changes without  canal stenosis. Neural foramina are patent.     L1-2:  Disc bulge slightly asymmetric to the left, facet degenerative  changes and mild ligamentum flavum hypertrophy with flattening of the  ventral thecal sac. No significant canal stenosis. Neural foramina  are patent.     L2-3:  Symmetric disc bulge, ligamentum flavum hypertrophy and facet  degenerative changes with moderate canal stenosis. Right subarticular  recess narrowing. Right greater than left moderate neural foraminal  narrowing with abutment of the exiting right L2 nerve  root.     L3-4:  Disc bulge asymmetric to the right, marked facet degenerative  changes and ligamentum flavum hypertrophy with severe canal stenosis.  Bilateral subarticular recess narrowing with abutment of the  traversing L4 nerve roots. Severe bilateral neural foraminal  narrowing with mild mass effect on the exiting L3 nerve roots.     L4-5:  Disc bulge, facet degenerative changes and ligamentum flavum  hypertrophy with severe canal stenosis. Bilateral subarticular recess  narrowing. Left-greater-than-right severe neural foraminal narrowing  with mass effect on the exiting left L4 nerve root.     L5-S1:  Disc bulge, moderate facet degenerative change and ligamentum  flavum hypertrophy without canal stenosis. Moderate bilateral neural  foraminal narrowing, greater on the left with abutment of the exiting  L5 nerve roots bilaterally.     The prevertebral and posterior paraspinous soft tissues are  unremarkable.  1.3 cm left interpolar region simple cyst.     IMPRESSION:  1.  Advanced multilevel degenerative changes of the lumbar spine with  up to severe canal stenosis and neural foraminal narrowing most  prominent at L3-L4 and L4-L5.  2. Varying degrees of multilevel abutment and mass effect on the  exiting nerve roots as described above.  3. Redundancy of the nerve roots at L3-L4 secondary to severe canal  narrowing and disc bulge.  4. Multilevel subarticular recess narrowing, most prominent at L3-L4  and L4-L5.   --------------       Media Information           Assessment/Plan   Problem List Items Addressed This Visit             ICD-10-CM    Facet arthropathy, lumbosacral M47.817    Sacroiliitis (CMS-[facility]) - Primary M46.1    Relevant Orders    FL pain management    Radiofrequency Ablation    FL pain management    Sacroiliac Joint Injection     Other Visit Diagnoses         Codes    Medication management     Z79.899    Relevant Orders    QUEST MISCELLANEOUS TEST (ROOM TEMPERATURE)              Plan/Follow-up  Instructions:   Continue with your prescribed medications.    ---------------    Your next appointment is with Dr. Way in:    [facility]    For pain block/injection on: 3/25/2025, right sacroiliac radiofrequency ablation    LOCAL    Stop Eliquis for 2 days     °You will receive a phone call the weekday before your appointment/procedure.   °Please KEEP your scheduled appointments.     °BRING your photo ID, insurance information, and a correct list of your home medications to EVERY visit.    °Please call our office at [phone] if you have any questions.     Antibiotic: You will receive Cipro before the procedure due to your history of right total knee replacement  ---------------    ---------------    Your next appointment is with Dr. Way in:    [facility]    For pain block/injection on: Left sacroiliac intra-articular joint injection, 4/15/2025    LOCAL    Stop Eliquis for 2 days     °You will receive a phone call the weekday before your appointment/procedure.   °Please KEEP your scheduled appointments.     °BRING your photo ID, insurance information, and a correct list of your home medications to EVERY visit.    °Please call our office at [phone] if you have any questions.     You will then be seen for a postprocedure follow-up in 8 weeks.    Antibiotic: You will receive Cipro before the procedure due to your history of right total knee replacement.  ---------------      Time     Prep time on date of the patient encounter: 2  Time spent directly with patient/family/caregiver: 30   Documentation time: 2  Total time on date of patient encounter: 34      Rosaura Ferro, LIZBETH, APRN, FNP-C    [facility]  Office #: [phone]  Fax # [phone]    ---------------------  Disclaimer: This note was created using voice recognition software. It was not corrected for typographical or grammatical errors, inadvertent word insertion, or any unintended errors.  Please feel free to contact me for clarification.  -----------------

## 2025-03-05 LAB
ATRIAL RATE: 73 BPM
P AXIS: 31 DEGREES
P OFFSET: 170 MS
P ONSET: 120 MS
PR INTERVAL: 198 MS
Q ONSET: 219 MS
QRS COUNT: 12 BEATS
QRS DURATION: 80 MS
QT INTERVAL: 388 MS
QTC CALCULATION(BAZETT): 427 MS
QTC FREDERICIA: 414 MS
R AXIS: 26 DEGREES
T AXIS: 73 DEGREES
T OFFSET: 413 MS
VENTRICULAR RATE: 73 BPM

## 2025-03-24 ENCOUNTER — TELEPHONE (OUTPATIENT)
Dept: PAIN MEDICINE | Facility: HOSPITAL | Age: OVER 89
End: 2025-03-24
Payer: MEDICARE

## 2025-03-25 ENCOUNTER — HOSPITAL ENCOUNTER (OUTPATIENT)
Dept: PAIN MEDICINE | Facility: HOSPITAL | Age: OVER 89
Discharge: HOME | End: 2025-03-25
Payer: MEDICARE

## 2025-03-25 VITALS
RESPIRATION RATE: 16 BRPM | OXYGEN SATURATION: 100 % | SYSTOLIC BLOOD PRESSURE: 153 MMHG | HEIGHT: 59 IN | WEIGHT: 148 LBS | DIASTOLIC BLOOD PRESSURE: 68 MMHG | HEART RATE: 80 BPM | BODY MASS INDEX: 29.84 KG/M2 | TEMPERATURE: 97.6 F

## 2025-03-25 DIAGNOSIS — M46.1 SACROILIITIS (CMS-HCC): ICD-10-CM

## 2025-03-25 PROCEDURE — 2720000007 HC OR 272 NO HCPCS

## 2025-03-25 PROCEDURE — 64625 RF ABLTJ NRV NRVTG SI JT: CPT | Performed by: ANESTHESIOLOGY

## 2025-03-25 PROCEDURE — 2500000004 HC RX 250 GENERAL PHARMACY W/ HCPCS (ALT 636 FOR OP/ED): Performed by: ANESTHESIOLOGY

## 2025-03-25 PROCEDURE — 64625 RF ABLTJ NRV NRVTG SI JT: CPT | Mod: MUE | Performed by: ANESTHESIOLOGY

## 2025-03-25 RX ORDER — CIPROFLOXACIN 500 MG/1
500 TABLET ORAL ONCE
Status: DISCONTINUED | OUTPATIENT
Start: 2025-03-25 | End: 2025-03-26 | Stop reason: HOSPADM

## 2025-03-25 RX ORDER — METHYLPREDNISOLONE ACETATE 40 MG/ML
INJECTION, SUSPENSION INTRA-ARTICULAR; INTRALESIONAL; INTRAMUSCULAR; SOFT TISSUE AS NEEDED
Status: COMPLETED | OUTPATIENT
Start: 2025-03-25 | End: 2025-03-25

## 2025-03-25 RX ORDER — BUPIVACAINE HYDROCHLORIDE 2.5 MG/ML
INJECTION, SOLUTION EPIDURAL; INFILTRATION; INTRACAUDAL; PERINEURAL AS NEEDED
Status: COMPLETED | OUTPATIENT
Start: 2025-03-25 | End: 2025-03-25

## 2025-03-25 RX ORDER — LIDOCAINE HYDROCHLORIDE 10 MG/ML
INJECTION, SOLUTION EPIDURAL; INFILTRATION; INTRACAUDAL; PERINEURAL AS NEEDED
Status: COMPLETED | OUTPATIENT
Start: 2025-03-25 | End: 2025-03-25

## 2025-03-25 RX ORDER — LIDOCAINE HYDROCHLORIDE 20 MG/ML
INJECTION, SOLUTION EPIDURAL; INFILTRATION; INTRACAUDAL; PERINEURAL AS NEEDED
Status: COMPLETED | OUTPATIENT
Start: 2025-03-25 | End: 2025-03-25

## 2025-03-25 RX ADMIN — LIDOCAINE HYDROCHLORIDE 20 ML: 10 INJECTION, SOLUTION EPIDURAL; INFILTRATION; INTRACAUDAL; PERINEURAL at 13:42

## 2025-03-25 RX ADMIN — BUPIVACAINE HYDROCHLORIDE 10 ML: 2.5 INJECTION, SOLUTION EPIDURAL; INFILTRATION; INTRACAUDAL; PERINEURAL at 13:42

## 2025-03-25 RX ADMIN — LIDOCAINE HYDROCHLORIDE 10 ML: 20 INJECTION, SOLUTION EPIDURAL; INFILTRATION; INTRACAUDAL at 13:42

## 2025-03-25 RX ADMIN — METHYLPREDNISOLONE ACETATE 80 MG: 40 INJECTION, SUSPENSION INTRALESIONAL; INTRAMUSCULAR; INTRASYNOVIAL; SOFT TISSUE at 13:42

## 2025-03-25 ASSESSMENT — PAIN SCALES - GENERAL
PAINLEVEL_OUTOF10: 0 - NO PAIN
PAINLEVEL_OUTOF10: 5 - MODERATE PAIN

## 2025-03-25 ASSESSMENT — PAIN - FUNCTIONAL ASSESSMENT
PAIN_FUNCTIONAL_ASSESSMENT: 0-10
PAIN_FUNCTIONAL_ASSESSMENT: 0-10

## 2025-03-25 NOTE — INTERVAL H&P NOTE
H&P reviewed. The patient was examined and there are no changes to the H&P.    No significant findings; reviewed medications and allergies; patient seen and discussed with attending physician responsible for performing the procedure.    Eliquis stopped 3/22/25.

## 2025-03-25 NOTE — DISCHARGE INSTRUCTIONS
Discharge Instructions:   ° Keep Band-Aid on for the next 24 hours.    ° No showering/bathing for the next 24 hours.    ° You may notice soreness or increased pain in the area of your injection, which may continue for the first 48 hours.    ° Avoid driving or operating any heavy machinery until the next day after the procedure.  ° You should resume any medications and your regular diet after the procedure.  ° You may resume regular daily activity but should avoid strenuous activity the day of the procedure.  Some of the side affects you may experience from the steroids are:  ° Insomnia (inability to sleep)  ° Increased sweating  ° Headaches  ° Increased fluid retention (swelling of your extremities)  ° Increase appetite  ° Face flushing  ° If you are a diabetic, your blood sugars may go up.  Closely monitor your diet.  Your blood sugar should return to normal in a few days.  Complications:   ° Complications are rare with the most common being temporary increase pain near the injection site. You can apply ice to affected area on the day of the procedure.   ° If the discomfort persists, apply moist heat to the area. Serious complications are very uncommon but may include bleeding, infection or nerve damage.   ° If severe pain, fever, redness or swelling near the injection site, have someone take you to the nearest emergency room to be evaluated for procedure complications or infection.  Expectations:   ° Local anesthetics wear off in several hours but duration of relief varies from individual to individual.     If you have any questions, please call the office at [phone].    If this is an emergency, call 911 or go to your nearest hospital.

## 2025-03-27 ASSESSMENT — PAIN SCALES - GENERAL: PAINLEVEL_OUTOF10: 0 - NO PAIN

## 2025-04-15 ENCOUNTER — APPOINTMENT (OUTPATIENT)
Dept: PAIN MEDICINE | Facility: HOSPITAL | Age: OVER 89
End: 2025-04-15
Payer: MEDICARE

## 2025-04-17 RX ORDER — CIPROFLOXACIN 500 MG/1
500 TABLET ORAL ONCE
OUTPATIENT
Start: 2025-04-22

## 2025-04-22 ENCOUNTER — APPOINTMENT (OUTPATIENT)
Dept: PAIN MEDICINE | Facility: HOSPITAL | Age: OVER 89
End: 2025-04-22
Payer: MEDICARE

## 2025-04-22 ENCOUNTER — TELEPHONE (OUTPATIENT)
Dept: OPERATING ROOM | Facility: HOSPITAL | Age: OVER 89
End: 2025-04-22

## 2025-04-28 ENCOUNTER — TELEPHONE (OUTPATIENT)
Dept: PAIN MEDICINE | Facility: HOSPITAL | Age: OVER 89
End: 2025-04-28
Payer: MEDICARE

## 2025-05-06 ENCOUNTER — APPOINTMENT (OUTPATIENT)
Dept: PAIN MEDICINE | Facility: HOSPITAL | Age: OVER 89
End: 2025-05-06
Payer: MEDICARE

## 2025-05-07 DIAGNOSIS — I10 PRIMARY HYPERTENSION: ICD-10-CM

## 2025-05-07 RX ORDER — LISINOPRIL 40 MG/1
40 TABLET ORAL DAILY
Qty: 90 TABLET | Refills: 1 | Status: SHIPPED | OUTPATIENT
Start: 2025-05-07

## 2025-05-13 ENCOUNTER — HOSPITAL ENCOUNTER (OUTPATIENT)
Dept: PAIN MEDICINE | Facility: HOSPITAL | Age: OVER 89
Discharge: HOME | End: 2025-05-13
Payer: MEDICARE

## 2025-05-13 VITALS
BODY MASS INDEX: 29.84 KG/M2 | HEART RATE: 75 BPM | WEIGHT: 148 LBS | OXYGEN SATURATION: 100 % | DIASTOLIC BLOOD PRESSURE: 82 MMHG | TEMPERATURE: 97.3 F | HEIGHT: 59 IN | RESPIRATION RATE: 18 BRPM | SYSTOLIC BLOOD PRESSURE: 151 MMHG

## 2025-05-13 DIAGNOSIS — M46.1 SACROILIITIS: ICD-10-CM

## 2025-05-13 LAB — GLUCOSE BLD MANUAL STRIP-MCNC: 80 MG/DL (ref 74–99)

## 2025-05-13 PROCEDURE — 82947 ASSAY GLUCOSE BLOOD QUANT: CPT

## 2025-05-13 PROCEDURE — 27096 INJECT SACROILIAC JOINT: CPT | Performed by: ANESTHESIOLOGY

## 2025-05-13 PROCEDURE — 2500000001 HC RX 250 WO HCPCS SELF ADMINISTERED DRUGS (ALT 637 FOR MEDICARE OP)

## 2025-05-13 RX ORDER — CIPROFLOXACIN 500 MG/1
TABLET ORAL
Status: COMPLETED
Start: 2025-05-13 | End: 2025-05-13

## 2025-05-13 RX ORDER — CIPROFLOXACIN 500 MG/1
500 TABLET ORAL ONCE
Status: DISCONTINUED | OUTPATIENT
Start: 2025-05-13 | End: 2025-05-14 | Stop reason: HOSPADM

## 2025-05-13 RX ADMIN — CIPROFLOXACIN 500 MG: 500 TABLET, FILM COATED ORAL at 10:43

## 2025-05-13 ASSESSMENT — PATIENT HEALTH QUESTIONNAIRE - PHQ9
1. LITTLE INTEREST OR PLEASURE IN DOING THINGS: NOT AT ALL
2. FEELING DOWN, DEPRESSED OR HOPELESS: NOT AT ALL
SUM OF ALL RESPONSES TO PHQ9 QUESTIONS 1 AND 2: 0

## 2025-05-13 ASSESSMENT — ENCOUNTER SYMPTOMS
SHORTNESS OF BREATH: 0
WOUND: 0
FEVER: 0
DIAPHORESIS: 0
CHILLS: 0
NEUROLOGICAL NEGATIVE: 1
FATIGUE: 0
BACK PAIN: 1
NAUSEA: 0
DIARRHEA: 0
VOMITING: 0

## 2025-05-13 ASSESSMENT — PAIN DESCRIPTION - DESCRIPTORS: DESCRIPTORS: ACHING

## 2025-05-13 ASSESSMENT — PAIN SCALES - GENERAL
PAINLEVEL_OUTOF10: 8
PAINLEVEL_OUTOF10: 0 - NO PAIN

## 2025-05-13 ASSESSMENT — PAIN - FUNCTIONAL ASSESSMENT
PAIN_FUNCTIONAL_ASSESSMENT: 0-10
PAIN_FUNCTIONAL_ASSESSMENT: 0-10

## 2025-05-13 NOTE — DISCHARGE INSTRUCTIONS
Discharge Instructions:   ° Keep Band-Aid on for the next 24 hours.    ° No showering/bathing for the next 24 hours.    ° You may notice soreness or increased pain in the area of your injection, which may continue for the first 48 hours.    ° Avoid driving or operating any heavy machinery until the next day after the procedure.  ° You should resume any medications and your regular diet after the procedure.  ° You may resume regular daily activity but should avoid strenuous activity the day of the procedure.  Some of the side affects you may experience from the steroids are:  ° Insomnia (inability to sleep)  ° Increased sweating  ° Headaches  ° Increased fluid retention (swelling of your extremities)  ° Increase appetite  ° Face flushing  ° If you are a diabetic, your blood sugars may go up.  Closely monitor your diet.  Your blood sugar should return to normal in a few days.  Complications:   ° Complications are rare with the most common being temporary increase pain near the injection site. You can apply ice to affected area on the day of the procedure.   ° If the discomfort persists, apply moist heat to the area. Serious complications are very uncommon but may include bleeding, infection or nerve damage.   ° If severe pain, fever, redness or swelling near the injection site, have someone take you to the nearest emergency room to be evaluated for procedure complications or infection.  Expectations:   ° Local anesthetics wear off in several hours but duration of relief varies from individual to individual.     If you have any questions, please call the office at [phone].    If this is an emergency, call 911 or go to your nearest hospital.

## 2025-05-13 NOTE — Clinical Note
Dr zarate prepped with chloraprep and draped
Dressing applied to insertion site.
Patient tolerated the procedure well and is comfortable with no complaints of pain. Vital signs stable. Arousable prior to transport. Patient transported to PACU via stretcher. Handoff completed. 
General

## 2025-05-13 NOTE — H&P
"History Of Present Illness  Zhanna Perez \"Juan Alberto\" is a 89 y.o. female presenting with sacroiliitis. Here for left sacroiliac intra-articular injection under local anesthesia. She was last seen for the right SI RFA on 3/25/25.  Eliquis was stopped 5/11. Had to reschedule due to sickness last month. She was treated with antibiotics and doing well now. Denies fever, chills, SOB, cough, CP, n/v/d, painful urination.          Past Medical History  Medical History[1]    Surgical History  Surgical History[2]     Social History  She reports that she quit smoking about 74 years ago. Her smoking use included cigarettes. She has been exposed to tobacco smoke. She has never used smokeless tobacco. She reports that she does not drink alcohol and does not use drugs.    Family History  Family History[3]     Allergies  Codeine, Darvocet a500 [propoxyphene n-acetaminophen], Dilaudid [hydromorphone], Gabapentin, and Propoxyphene-acetaminophen    Medications Ordered Prior to Encounter[4]    Review of Systems   Constitutional:  Negative for chills, diaphoresis, fatigue and fever.   HENT: Negative.     Respiratory:  Negative for shortness of breath.    Cardiovascular:  Negative for chest pain.   Gastrointestinal:  Negative for diarrhea, nausea and vomiting.   Musculoskeletal:  Positive for back pain.   Skin:  Negative for pallor, rash and wound.   Neurological: Negative.         Physical Exam  Constitutional:       General: She is not in acute distress.     Appearance: Normal appearance.   HENT:      Head: Normocephalic.      Mouth/Throat:      Mouth: Mucous membranes are moist.   Eyes:      General: No scleral icterus.     Conjunctiva/sclera: Conjunctivae normal.      Pupils: Pupils are equal, round, and reactive to light.   Cardiovascular:      Rate and Rhythm: Normal rate and regular rhythm.      Pulses: Normal pulses.      Heart sounds: Normal heart sounds.   Pulmonary:      Effort: Pulmonary effort is normal. No respiratory " distress.      Breath sounds: Normal breath sounds.   Musculoskeletal:         General: Normal range of motion.   Skin:     General: Skin is warm and dry.   Neurological:      General: No focal deficit present.      Mental Status: She is alert and oriented to person, place, and time. Mental status is at baseline.   Psychiatric:         Mood and Affect: Mood normal.          Last Recorded Vitals  Vitals:    05/13/25 1028   BP: 111/56   Pulse: 75   Resp: 17   Temp: 36.3 °C (97.3 °F)   SpO2: 95%       Assessment & Plan  Sacroiliitis (CMS-HCC)      Left sacroiliac intra-articular injection under local anesthesia    I spent 15 minutes in the professional and overall care of this patient.      Tammy Bajwa PA-C         [1]   Past Medical History:  Diagnosis Date    Age-related cognitive decline 03/31/2015    Age-related cognitive decline    Body mass index (BMI) 32.0-32.9, adult 10/28/2020    Body mass index (BMI) of 32.0 to 32.9 in adult    Bursitis of right shoulder 08/21/2018    Subdeltoid bursitis of right shoulder joint    Encounter for general adult medical examination without abnormal findings 04/01/2016    Medicare annual wellness visit, subsequent    Encounter for other preprocedural examination 08/30/2016    Pre-operative clearance    Erythematous condition, unspecified 05/09/2018    Breast erythema    Hyperlipidemia     Mastitis without abscess 05/09/2018    Cellulitis of breast    Mastodynia 05/09/2018    Breast tenderness    Mastodynia 05/09/2018    Pain of left breast    Obesity, unspecified 08/02/2021    Class 1 obesity with serious comorbidity and body mass index (BMI) of 33.0 to 33.9 in adult, unspecified obesity type    Obesity, unspecified 10/28/2020    Obesity (BMI 30-39.9)    Obesity, unspecified 09/15/2021    Class 1 obesity with serious comorbidity and body mass index (BMI) of 34.0 to 34.9 in adult, unspecified obesity type    Obesity, unspecified 05/03/2021    Class 1 obesity with serious  comorbidity and body mass index (BMI) of 32.0 to 32.9 in adult, unspecified obesity type    Occipital neuralgia 05/12/2016    Bilateral occipital neuralgia    Other conditions influencing health status 01/28/2020    Uncontrolled type 2 diabetes mellitus with complication, without long-term current use of insulin    Other conditions influencing health status 04/07/2017    Myalgia and myositis    Pain in right shoulder 07/05/2018    Pain in joint of right shoulder    Pain in unspecified hip 12/29/2014    Hip pain    Personal history of other diseases of the female genital tract 05/09/2018    History of breast swelling    Personal history of other diseases of the musculoskeletal system and connective tissue     History of tendinitis    Personal history of other diseases of the nervous system and sense organs 08/23/2017    History of sleep apnea    Personal history of other diseases of the nervous system and sense organs 03/27/2014    History of sleep apnea    Personal history of other diseases of the respiratory system 06/27/2016    History of acute bronchitis    Personal history of other diseases of the respiratory system 06/19/2020    History of acute sinusitis    Personal history of other infectious and parasitic diseases 02/25/2016    History of viral encephalitis    Trigger finger, right middle finger 03/19/2020    Trigger middle finger of right hand    Trochanteric bursitis, left hip 06/03/2019    Trochanteric bursitis of left hip    Type 2 diabetes mellitus with other skin complications 05/21/2018    Uncontrolled type 2 diabetes mellitus with other skin complication, unspecified long term insulin use status    Unilateral primary osteoarthritis, right knee 08/16/2017    Arthritis of knee, right    Urinary tract infection, site not specified 10/31/2020    Acute UTI   [2]   Past Surgical History:  Procedure Laterality Date    BREAST SURGERY  03/02/2015    Breast Surgery    EYE SURGERY  03/02/2015    Eye Surgery     JOINT REPLACEMENT Right     knee replacement    KNEE ARTHROSCOPY W/ DEBRIDEMENT  04/30/2013    Knee Arthroscopy (Therapeutic)    MR NECK ANGIO WO IV CONTRAST  02/26/2016    MR NECK ANGIO WO IV CONTRAST 2/26/2016 [facility]    OTHER SURGICAL HISTORY  04/30/2013    Supracervical Hysterectomy Laparoscopic Uterus 250g Or Less    TONSILLECTOMY  04/30/2013    Tonsillectomy   [3]   Family History  Problem Relation Name Age of Onset    Brain cancer Father      Coronary artery disease Brother     [4]   Current Outpatient Medications on File Prior to Encounter   Medication Sig Dispense Refill    acetaminophen (TYLENOL ORAL) Take by mouth.      alendronate (Fosamax) 70 mg tablet Take 1 tablet (70 mg) by mouth every 7 days. IN AM W/ 6-8OZ PLAIN WATER. DO NOT EAT/LIE DOWN FOR 30 MIN AFTER 12 tablet 1    ammonium lactate (Lac-Hydrin) 12 % lotion PLEASE APPLY TO BOTH FEET TWO TIMES PER DAY.      apixaban (Eliquis) 5 mg tablet Take 1 tablet (5 mg) by mouth 2 times a day. 180 tablet 1    blood pressure monitor kit Use to check BP daily and as needed 1 kit 0    blood sugar diagnostic (Premier Test Strip) strip TEST BLOOD SUGARonce daily. E11.9 100 each 1    cholecalciferol (Vitamin D-3) 25 MCG (1000 UT) capsule Take 1 capsule (25 mcg) by mouth once daily. 90 capsule 3    DULoxetine (Cymbalta) 30 mg DR capsule Take 1 capsule (30 mg) by mouth once daily. 90 capsule 1    ferrous gluconate (Fergon) 324 (38 Fe) mg tablet Take 1 tablet (38 mg of iron) by mouth once daily with breakfast. 90 tablet 3    ferrous sulfate 325 (65 Fe) MG EC tablet Take 1 tablet by mouth once daily with breakfast. Do not crush, chew, or split. 90 tablet 3    fluticasone (Flonase) 50 mcg/actuation nasal spray Administer 2 sprays into each nostril once daily. Shake gently. Before first use, prime pump. After use, clean tip and replace cap. 48 mL 1    folic acid (Folvite) 1 mg tablet Take 1 tablet (1 mg) by mouth once daily. 90 tablet 1    FreeStyle glucose  "monitoring kit 1 each if needed.      glimepiride (Amaryl) 2 mg tablet Take 1 tablet (2 mg) by mouth once daily in the morning. Take before meals. 90 tablet 1    insulin glargine (Lantus Solostar U-100 Insulin) 100 unit/mL (3 mL) pen Inject 10 Units under the skin once daily at bedtime. Take as directed per insulin instructions. 15 mL 1    levothyroxine (Synthroid, Levoxyl) 25 mcg tablet Take 1 tablet (25 mcg) by mouth once daily. 90 tablet 1    lisinopril 40 mg tablet TAKE 1 TABLET BY MOUTH EVERY DAY 90 tablet 1    loperamide (Imodium) 2 mg capsule Take 1 capsule (2 mg) by mouth 4 times a day as needed for diarrhea.      magnesium hydroxide (Milk of Magnesia) 400 mg/5 mL suspension Take 30 mL by mouth once daily as needed for constipation.      meclizine (Antivert) 25 mg tablet Take 1 tablet (25 mg) by mouth 3 times a day as needed for dizziness. 30 tablet 1    omeprazole (PriLOSEC) 20 mg DR capsule Take 1 capsule (20 mg) by mouth once daily in the morning. Take before meals. Do not crush or chew. 90 capsule 1    pen needle, diabetic (BD Ultra-Fine Mini Pen Needle) 31 gauge x 3/16\" needle INJECT 1 EACH UNDER THE SKIN ONCE DAILY. USE AS INSTRUCTED. USE TO INJECT INSULIN 100 each 1    rosuvastatin (Crestor) 20 mg tablet Take 1 tablet (20 mg) by mouth once daily. 90 tablet 1    tiZANidine (Zanaflex) 2 mg tablet TAKE 1 TABLET (2 MG) BY MOUTH 2 TIMES A DAY AS NEEDED FOR MUSCLE SPASMS. 60 tablet 0    vit C/E/Zn/coppr/lutein/zeaxan (PRESERVISION AREDS-2 ORAL) Take 1 capsule by mouth 2 times a day.      [DISCONTINUED] lisinopril 40 mg tablet Take 1 tablet (40 mg) by mouth once daily. 90 tablet 0     No current facility-administered medications on file prior to encounter.     "

## 2025-05-24 ENCOUNTER — APPOINTMENT (OUTPATIENT)
Dept: RADIOLOGY | Facility: HOSPITAL | Age: OVER 89
End: 2025-05-24
Payer: MEDICARE

## 2025-05-24 ENCOUNTER — HOSPITAL ENCOUNTER (EMERGENCY)
Facility: HOSPITAL | Age: OVER 89
Discharge: HOME | End: 2025-05-24
Attending: FAMILY MEDICINE
Payer: MEDICARE

## 2025-05-24 ENCOUNTER — APPOINTMENT (OUTPATIENT)
Dept: CARDIOLOGY | Facility: HOSPITAL | Age: OVER 89
End: 2025-05-24
Payer: MEDICARE

## 2025-05-24 VITALS
RESPIRATION RATE: 20 BRPM | TEMPERATURE: 97.4 F | SYSTOLIC BLOOD PRESSURE: 146 MMHG | OXYGEN SATURATION: 100 % | WEIGHT: 130 LBS | BODY MASS INDEX: 26.21 KG/M2 | HEART RATE: 74 BPM | DIASTOLIC BLOOD PRESSURE: 95 MMHG | HEIGHT: 59 IN

## 2025-05-24 DIAGNOSIS — S60.221A CONTUSION OF RIGHT HAND, INITIAL ENCOUNTER: ICD-10-CM

## 2025-05-24 DIAGNOSIS — Z79.01 ENCOUNTER FOR MONITORING DIRECT ORAL ANTICOAGULANT THERAPY: ICD-10-CM

## 2025-05-24 DIAGNOSIS — S09.90XA INJURY OF HEAD, INITIAL ENCOUNTER: Primary | ICD-10-CM

## 2025-05-24 DIAGNOSIS — Z51.81 ENCOUNTER FOR MONITORING DIRECT ORAL ANTICOAGULANT THERAPY: ICD-10-CM

## 2025-05-24 DIAGNOSIS — N39.0 URINARY TRACT INFECTION WITHOUT HEMATURIA, SITE UNSPECIFIED: ICD-10-CM

## 2025-05-24 DIAGNOSIS — S00.83XA CONTUSION OF FACE, INITIAL ENCOUNTER: ICD-10-CM

## 2025-05-24 LAB
ANION GAP SERPL CALC-SCNC: 11 MMOL/L (ref 10–20)
APPEARANCE UR: CLEAR
APTT PPP: 34 SECONDS (ref 26–36)
BASOPHILS # BLD AUTO: 0.04 X10*3/UL (ref 0–0.1)
BASOPHILS NFR BLD AUTO: 0.3 %
BILIRUB UR STRIP.AUTO-MCNC: NEGATIVE MG/DL
BUN SERPL-MCNC: 16 MG/DL (ref 6–23)
CALCIUM SERPL-MCNC: 9.5 MG/DL (ref 8.6–10.3)
CARDIAC TROPONIN I PNL SERPL HS: 4 NG/L (ref 0–13)
CHLORIDE SERPL-SCNC: 105 MMOL/L (ref 98–107)
CO2 SERPL-SCNC: 27 MMOL/L (ref 21–32)
COLOR UR: ABNORMAL
CREAT SERPL-MCNC: 0.94 MG/DL (ref 0.5–1.05)
EGFRCR SERPLBLD CKD-EPI 2021: 57 ML/MIN/1.73M*2
EOSINOPHIL # BLD AUTO: 0.27 X10*3/UL (ref 0–0.4)
EOSINOPHIL NFR BLD AUTO: 2.1 %
ERYTHROCYTE [DISTWIDTH] IN BLOOD BY AUTOMATED COUNT: 15.5 % (ref 11.5–14.5)
GLUCOSE SERPL-MCNC: 83 MG/DL (ref 74–99)
GLUCOSE UR STRIP.AUTO-MCNC: NORMAL MG/DL
HCT VFR BLD AUTO: 41.2 % (ref 36–46)
HGB BLD-MCNC: 12.9 G/DL (ref 12–16)
IMM GRANULOCYTES # BLD AUTO: 0.08 X10*3/UL (ref 0–0.5)
IMM GRANULOCYTES NFR BLD AUTO: 0.6 % (ref 0–0.9)
INR PPP: 1.3 (ref 0.9–1.1)
KETONES UR STRIP.AUTO-MCNC: NEGATIVE MG/DL
LEUKOCYTE ESTERASE UR QL STRIP.AUTO: ABNORMAL
LYMPHOCYTES # BLD AUTO: 2.35 X10*3/UL (ref 0.8–3)
LYMPHOCYTES NFR BLD AUTO: 18 %
MCH RBC QN AUTO: 28 PG (ref 26–34)
MCHC RBC AUTO-ENTMCNC: 31.3 G/DL (ref 32–36)
MCV RBC AUTO: 90 FL (ref 80–100)
MONOCYTES # BLD AUTO: 0.9 X10*3/UL (ref 0.05–0.8)
MONOCYTES NFR BLD AUTO: 6.9 %
MUCOUS THREADS #/AREA URNS AUTO: ABNORMAL /LPF
NEUTROPHILS # BLD AUTO: 9.39 X10*3/UL (ref 1.6–5.5)
NEUTROPHILS NFR BLD AUTO: 72.1 %
NITRITE UR QL STRIP.AUTO: NEGATIVE
NRBC BLD-RTO: 0 /100 WBCS (ref 0–0)
PH UR STRIP.AUTO: 6.5 [PH]
PLATELET # BLD AUTO: 271 X10*3/UL (ref 150–450)
POTASSIUM SERPL-SCNC: 4.6 MMOL/L (ref 3.5–5.3)
PROT UR STRIP.AUTO-MCNC: NEGATIVE MG/DL
PROTHROMBIN TIME: 14.9 SECONDS (ref 9.8–12.4)
RBC # BLD AUTO: 4.6 X10*6/UL (ref 4–5.2)
RBC # UR STRIP.AUTO: NEGATIVE MG/DL
RBC #/AREA URNS AUTO: ABNORMAL /HPF
SODIUM SERPL-SCNC: 138 MMOL/L (ref 136–145)
SP GR UR STRIP.AUTO: 1.01
SQUAMOUS #/AREA URNS AUTO: ABNORMAL /HPF
UROBILINOGEN UR STRIP.AUTO-MCNC: NORMAL MG/DL
WBC # BLD AUTO: 13 X10*3/UL (ref 4.4–11.3)
WBC #/AREA URNS AUTO: ABNORMAL /HPF

## 2025-05-24 PROCEDURE — 73110 X-RAY EXAM OF WRIST: CPT | Mod: RT

## 2025-05-24 PROCEDURE — 36415 COLL VENOUS BLD VENIPUNCTURE: CPT | Performed by: FAMILY MEDICINE

## 2025-05-24 PROCEDURE — 73130 X-RAY EXAM OF HAND: CPT | Mod: RT

## 2025-05-24 PROCEDURE — 71045 X-RAY EXAM CHEST 1 VIEW: CPT

## 2025-05-24 PROCEDURE — 2500000002 HC RX 250 W HCPCS SELF ADMINISTERED DRUGS (ALT 637 FOR MEDICARE OP, ALT 636 FOR OP/ED)

## 2025-05-24 PROCEDURE — 99285 EMERGENCY DEPT VISIT HI MDM: CPT | Mod: 25 | Performed by: FAMILY MEDICINE

## 2025-05-24 PROCEDURE — 73110 X-RAY EXAM OF WRIST: CPT | Mod: RIGHT SIDE | Performed by: STUDENT IN AN ORGANIZED HEALTH CARE EDUCATION/TRAINING PROGRAM

## 2025-05-24 PROCEDURE — 84484 ASSAY OF TROPONIN QUANT: CPT | Performed by: FAMILY MEDICINE

## 2025-05-24 PROCEDURE — 70450 CT HEAD/BRAIN W/O DYE: CPT | Performed by: STUDENT IN AN ORGANIZED HEALTH CARE EDUCATION/TRAINING PROGRAM

## 2025-05-24 PROCEDURE — 71046 X-RAY EXAM CHEST 2 VIEWS: CPT | Performed by: STUDENT IN AN ORGANIZED HEALTH CARE EDUCATION/TRAINING PROGRAM

## 2025-05-24 PROCEDURE — 70450 CT HEAD/BRAIN W/O DYE: CPT

## 2025-05-24 PROCEDURE — 85730 THROMBOPLASTIN TIME PARTIAL: CPT | Performed by: FAMILY MEDICINE

## 2025-05-24 PROCEDURE — 80048 BASIC METABOLIC PNL TOTAL CA: CPT | Performed by: FAMILY MEDICINE

## 2025-05-24 PROCEDURE — 93005 ELECTROCARDIOGRAM TRACING: CPT

## 2025-05-24 PROCEDURE — 85025 COMPLETE CBC W/AUTO DIFF WBC: CPT | Performed by: FAMILY MEDICINE

## 2025-05-24 PROCEDURE — 85610 PROTHROMBIN TIME: CPT | Performed by: FAMILY MEDICINE

## 2025-05-24 PROCEDURE — 73130 X-RAY EXAM OF HAND: CPT | Mod: RIGHT SIDE | Performed by: STUDENT IN AN ORGANIZED HEALTH CARE EDUCATION/TRAINING PROGRAM

## 2025-05-24 PROCEDURE — 87086 URINE CULTURE/COLONY COUNT: CPT | Mod: GENLAB | Performed by: FAMILY MEDICINE

## 2025-05-24 PROCEDURE — 81001 URINALYSIS AUTO W/SCOPE: CPT | Performed by: FAMILY MEDICINE

## 2025-05-24 RX ORDER — CEFTRIAXONE 1 G/50ML
1 INJECTION, SOLUTION INTRAVENOUS ONCE
Status: DISCONTINUED | OUTPATIENT
Start: 2025-05-24 | End: 2025-05-24

## 2025-05-24 RX ORDER — SULFAMETHOXAZOLE AND TRIMETHOPRIM 800; 160 MG/1; MG/1
TABLET ORAL
Status: COMPLETED
Start: 2025-05-24 | End: 2025-05-24

## 2025-05-24 RX ORDER — SULFAMETHOXAZOLE AND TRIMETHOPRIM 800; 160 MG/1; MG/1
1 TABLET ORAL 2 TIMES DAILY
Qty: 14 TABLET | Refills: 0 | Status: SHIPPED | OUTPATIENT
Start: 2025-05-24 | End: 2025-05-24

## 2025-05-24 RX ORDER — SULFAMETHOXAZOLE AND TRIMETHOPRIM 800; 160 MG/1; MG/1
1 TABLET ORAL 2 TIMES DAILY
Qty: 14 TABLET | Refills: 0 | Status: SHIPPED | OUTPATIENT
Start: 2025-05-24 | End: 2025-05-31

## 2025-05-24 RX ORDER — SULFAMETHOXAZOLE AND TRIMETHOPRIM 800; 160 MG/1; MG/1
1 TABLET ORAL ONCE
Status: COMPLETED | OUTPATIENT
Start: 2025-05-24 | End: 2025-05-24

## 2025-05-24 RX ADMIN — SULFAMETHOXAZOLE AND TRIMETHOPRIM 1 TABLET: 800; 160 TABLET ORAL at 13:42

## 2025-05-24 ASSESSMENT — COLUMBIA-SUICIDE SEVERITY RATING SCALE - C-SSRS
1. IN THE PAST MONTH, HAVE YOU WISHED YOU WERE DEAD OR WISHED YOU COULD GO TO SLEEP AND NOT WAKE UP?: NO
6. HAVE YOU EVER DONE ANYTHING, STARTED TO DO ANYTHING, OR PREPARED TO DO ANYTHING TO END YOUR LIFE?: NO
2. HAVE YOU ACTUALLY HAD ANY THOUGHTS OF KILLING YOURSELF?: NO

## 2025-05-24 ASSESSMENT — PAIN DESCRIPTION - ORIENTATION: ORIENTATION: RIGHT

## 2025-05-24 ASSESSMENT — PAIN DESCRIPTION - PAIN TYPE: TYPE: ACUTE PAIN

## 2025-05-24 ASSESSMENT — PAIN - FUNCTIONAL ASSESSMENT: PAIN_FUNCTIONAL_ASSESSMENT: 0-10

## 2025-05-24 ASSESSMENT — PAIN SCALES - GENERAL: PAINLEVEL_OUTOF10: 8

## 2025-05-24 ASSESSMENT — PAIN DESCRIPTION - LOCATION: LOCATION: WRIST

## 2025-05-24 NOTE — ED PROVIDER NOTES
Emergency Department Provider Note       History of Present Illness     History provided by: Patient  Limitations to History: None  External Records Reviewed with Brief Summary: None    HPI:  Zhanna Perez is a 89 y.o. female was brought into the emergency room by the daughter with a complaint of right hand and wrist pain and swelling after she fell on the morning of 23rd more than 24 hours ago at assisted living and suffered a right hand contusion.  Patient said that she went to the bathroom and she fell she thinks she tripped but did not pass out, she fell and hit her face on the ground and also in the process when she tried to break the fall she suffered right hand injury complaining of pain and swelling around the right thumb right 1st and 2nd metacarpal area of the right hand and some wrist discomfort but mostly in the hands 1st and 2nd metacarpal thumb area swelling.  She also suffered contusion face but denies any headache dizziness lightheaded palpitation any neck pain chest pain or short of breath or any difficulty moving arms show level her wrist bilaterally except right hand injury denies any hip or knee injury she has history of arthritis but she has been able to move her arms and legs.  She can walk short distance for long distance she requires wheelchair.  She lives in assisted living.  She has been on Eliquis due to prior history of mini stroke.  Denies any stroke symptoms.  Denies any chest pain short of breath dizziness lightheaded palpitation before or after the fall.  Denies blacked out or pass out.  Denies incontinence.  Also denies any fever chills cough nausea vomiting or diarrhea.  She was brought in by private car by her daughter.        Physical Exam   Triage vitals:  T    HR    BP    RR    O2        General: Awake, alert, in no acute distress wrist without any edema and tenderness in left thumb  Back nontender she has arthritic change of bilateral wrist she has some stiffness of  the right thumb but could move it to about 50% range of motion of the right thumb due to pain and swelling she Mostly due to swelling she cannot fully flex and extend into the palms of her hand as a right thumb rest the digit with good motion and nontender.  Intact radial pulse intact sensation she was not tender upon palpation cervical thoracic or lumbar spine chest wall nontender pelvic is stable Goodemote straight leg position bilaterally.  Both lower extremity motion nontender pelvic is stable  .  Slight facial bruise without ecchymosis petechiae ecchymosis.  Pupils are equal nonspinal light accommodation extraocular motor intact fundi is grossly normal.  Clear neck is supple cervical thoracic lumbar spine nontender.  She has clear breath sound bilaterally without wheezing or rales no chest wall tenderness noted no crepitation subcutaneous emphysema.    Heart regular rate and rhythm vascular no abdominal tenderness no CVA tenderness noted.  Pelvis stable and Goodemote significant ankle exam.  Neck was supple she was examined she was in the wheelchair patient with good motion hip and ankle joint medical stable dorsiflexion present for foot and toes calf ulcer nontender Homans' sign negative  Except right hand edema of the thumb and 1st and 2nd metacarpal as described above.  Intact radial pulses.  Some decreased range of motion of thumb due to edema  Eyes: Gaze conjugate.  No scleral icterus or injection  HENT: Normo-cephalic, atraumatic. No stridor  CV: Regular rate, regular rhythm. Radial pulses 2+ bilaterally  Resp: Breathing non-labored, speaking in full sentences.  Clear to auscultation bilaterally  GI: Soft, non-distended, non-tender. No rebound or guarding.  : No dysuria right thumb pain right hand 1st and 2nd metacarpal area redness swelling and bruise  MSK/Extremities: No gross bony deformities. Moving all extremities but able to move thumb otherwise interphalange joint with good motion.  Intact radial  pulses bilaterally..  An attempt snuffbox nontender  Skin: Warm. Appropriate color  Neuro: Alert. Oriented. Face symmetric. Speech is fluent.  Gross strength and sensation intact in b/l UE and LEs  Psych: Appropriate mood and affect      Medical Decision Making & ED Course   Medical Decision Makin y.o. female who has been on Eliquis was brought into the emergency room by the daughter with a complaint of right thumb pain and swelling and stiffness with some limited range of motion due to fall fell about 32 hrs. ago at assisted living, was evaluated by nursing staff at assisted living and was fine until today they noticed some swelling in her right thumb and decided send her to the ER for evaluation as her daughter visited with her today on Saturday and found a bruise.    She denies loss of conscious blackout or posture denies injury chest abdomen pelvis admitted facial contusion but no headache to the left hand or any neck pain.    She was noted slight bruising from IntraStent area of the face close to the nasal bridge area without petechiae ecchymosis red streak pupil equal nonspinal recommendation extraocular for intact fundi grossly was normal.  She was noted to have right hand edema of the 1st and 2nd metacarpal over the first metacarpal and thumb stiffening of thumb with limited range of motion due to edema and redness recent trauma history of nontender resting reasonably motion breast MSK examination with no tenderness upon palpation cervical thoracic lumbar spine and both upper lower extremities.  Chest wall nontender to breathing comfortably no tachypnea hypoxemia history 70 examination grossly within normal range.  She was found to have chronic visual impairment due to macular degeneration but otherwise she was awake and alert and neuroexam she was evaluated.  Spine nontender neck was supple.    Patient had history of stroke on blood thinner right hand injury as result I ordered right hand and wrist  x-ray chest x-ray.  CT of the head and cervical spine were also also ordered because patient's oxygen fell hit her head on the ground and she is on blood thinner.    CBC with BMP and troponin is pending as well as PT/INR.  CT of the head and face cervical spine neck CT was negative per radiology reading I reviewed the images    Also.  Her labs are unremarkable troponin x 2 was negative however we were able to subsequent obtain a UA which showed evidence of UTI as result of urine culture ordered and she was put on antibiotic first dose of IV Rocephin given discharged home with a p.o. antibiotic.  Bactrim DS.            Differential diagnoses considered include but are not limited to: Head injury, intracranial bleed facial contusion cervical sprain cervical fracture, chest contusion, pneumonia, myocardial infarct, arrhythmia, UTI, wrist fracture, hand fracture wrist sprain, hand sprain hand contusion    Social Determinants of Health which Significantly Impact Care: Assisted living     EKG Independent Interpretation: EKG interpreted by myself. Please see ED Course for full interpretation.    Independent Result Review and Interpretation: Labs imaging study and EKG were reviewed by me patient reevaluated.  Final report from radiologist pending further  All the labs are reviewed by me, CT read by radiologist and also reviewed the CT images.        Chronic conditions affecting the patient's care: On blood thinner history of mini strokes walks only short distance    The patient was discussed with the following consultants/services: None    Care Considerations: See [facility]    ED Course:  ED Course as of 05/24/25 1329   Sat May 24, 2025   1220 XR wrist right 3+ views [SP]      ED Course User Index  [SP] Dameon Headley MD         Diagnoses as of 05/24/25 1329   Injury of head, initial encounter   Contusion of face, initial encounter   Urinary tract infection without hematuria, site unspecified   Contusion of right hand, initial  encounter   Encounter for monitoring direct oral anticoagulant therapy     EKG obtained at 1202 hrs. shows sinus rhythm with first-degree AV block.  Anterior infarct age undetermined.  Heart rate 76.  No ST-T evaluation.  No STEMI.  Abnormal EKG.  I read this EKG.  Disposition   As a result of the work-up, the patient was discharged home.  she was informed of her diagnosis and instructed to come back with any concerns or worsening of condition.  she and was agreeable to the plan as discussed above.  she was given the opportunity to ask questions.  All of the patient's questions were answered.    Procedures   Procedures        Dameon Headley MD  Emergency Medicine                                                            Dameon Headley MD  05/24/25 1332       Dameon Headley MD  05/24/25 [id number]

## 2025-05-24 NOTE — DISCHARGE INSTRUCTIONS
Treatment today precaution for the fall.  If you like you do not fall please sit down on the ground nicely to prevent any injury to head neck especially you are on blood thinner.  Your CT of the head was negative for any acute intracranial injury and no injury to the neck.  However if develop any new symptom or worsening symptoms return to ER.  There is suggestion of UTI as result you been put on antibiotic.  Since he denies any chest pain short of breath no other symptoms and x-ray of the right hand and wrist showed no fracture given fluids.  Fitted with a splint referred to orthopedic as well as primary care physician.  Ibuprofen antibiotics too.  Staff at the assisted living to watch her closely.  Once again if any problem concern return to ER

## 2025-05-26 LAB — BACTERIA UR CULT: NO GROWTH

## 2025-05-28 LAB
ATRIAL RATE: 76 BPM
P AXIS: 41 DEGREES
P OFFSET: 164 MS
P ONSET: 116 MS
PR INTERVAL: 214 MS
Q ONSET: 223 MS
QRS COUNT: 12 BEATS
QRS DURATION: 66 MS
QT INTERVAL: 370 MS
QTC CALCULATION(BAZETT): 416 MS
QTC FREDERICIA: 400 MS
R AXIS: 1 DEGREES
T AXIS: 69 DEGREES
T OFFSET: 408 MS
VENTRICULAR RATE: 76 BPM

## 2025-06-09 ENCOUNTER — APPOINTMENT (OUTPATIENT)
Dept: PRIMARY CARE | Facility: CLINIC | Age: OVER 89
End: 2025-06-09
Payer: MEDICARE

## 2025-06-09 DIAGNOSIS — E11.65 TYPE 2 DIABETES MELLITUS WITH HYPERGLYCEMIA, WITH LONG-TERM CURRENT USE OF INSULIN: ICD-10-CM

## 2025-06-09 DIAGNOSIS — Z79.4 TYPE 2 DIABETES MELLITUS WITH HYPERGLYCEMIA, WITH LONG-TERM CURRENT USE OF INSULIN: ICD-10-CM

## 2025-06-09 RX ORDER — INSULIN GLARGINE 100 [IU]/ML
10 INJECTION, SOLUTION SUBCUTANEOUS NIGHTLY
Qty: 9 ML | Refills: 1 | Status: SHIPPED | OUTPATIENT
Start: 2025-06-09

## 2025-06-26 ENCOUNTER — OFFICE VISIT (OUTPATIENT)
Dept: PAIN MEDICINE | Facility: HOSPITAL | Age: OVER 89
End: 2025-06-26
Payer: MEDICARE

## 2025-06-26 ENCOUNTER — HOSPITAL ENCOUNTER (OUTPATIENT)
Dept: RADIOLOGY | Facility: HOSPITAL | Age: OVER 89
Discharge: HOME | End: 2025-06-26
Payer: MEDICARE

## 2025-06-26 VITALS
DIASTOLIC BLOOD PRESSURE: 79 MMHG | HEART RATE: 81 BPM | RESPIRATION RATE: 16 BRPM | WEIGHT: 145 LBS | TEMPERATURE: 98 F | SYSTOLIC BLOOD PRESSURE: 188 MMHG | HEIGHT: 61 IN | BODY MASS INDEX: 27.38 KG/M2 | OXYGEN SATURATION: 98 %

## 2025-06-26 DIAGNOSIS — M25.521 RIGHT ELBOW PAIN: ICD-10-CM
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
## 2025-06-26 DIAGNOSIS — Z79.899 MEDICATION MANAGEMENT: ICD-10-CM

## 2025-06-26 DIAGNOSIS — M79.631 PAIN OF RIGHT FOREARM: ICD-10-CM

## 2025-06-26 DIAGNOSIS — M47.817 FACET ARTHROPATHY, LUMBOSACRAL: Primary | ICD-10-CM

## 2025-06-26 PROCEDURE — 1125F AMNT PAIN NOTED PAIN PRSNT: CPT | Performed by: NURSE PRACTITIONER

## 2025-06-26 PROCEDURE — 1159F MED LIST DOCD IN RCRD: CPT | Performed by: NURSE PRACTITIONER

## 2025-06-26 PROCEDURE — 3078F DIAST BP <80 MM HG: CPT | Performed by: NURSE PRACTITIONER

## 2025-06-26 PROCEDURE — 3077F SYST BP >= 140 MM HG: CPT | Performed by: NURSE PRACTITIONER

## 2025-06-26 PROCEDURE — 99214 OFFICE O/P EST MOD 30 MIN: CPT | Performed by: NURSE PRACTITIONER

## 2025-06-26 PROCEDURE — 73090 X-RAY EXAM OF FOREARM: CPT | Mod: RT

## 2025-06-26 PROCEDURE — 73080 X-RAY EXAM OF ELBOW: CPT | Mod: RT

## 2025-06-26 PROCEDURE — 1160F RVW MEDS BY RX/DR IN RCRD: CPT | Performed by: NURSE PRACTITIONER

## 2025-06-26 PROCEDURE — 73080 X-RAY EXAM OF ELBOW: CPT | Mod: RIGHT SIDE | Performed by: RADIOLOGY

## 2025-06-26 PROCEDURE — 1036F TOBACCO NON-USER: CPT | Performed by: NURSE PRACTITIONER

## 2025-06-26 PROCEDURE — G2211 COMPLEX E/M VISIT ADD ON: HCPCS | Performed by: NURSE PRACTITIONER

## 2025-06-26 PROCEDURE — 73090 X-RAY EXAM OF FOREARM: CPT | Mod: RIGHT SIDE | Performed by: RADIOLOGY

## 2025-06-26 ASSESSMENT — ENCOUNTER SYMPTOMS
ARTHRALGIAS: 1
BACK PAIN: 1
CONSTITUTIONAL NEGATIVE: 1
MYALGIAS: 1
RESPIRATORY NEGATIVE: 1
ENDOCRINE NEGATIVE: 1
NEUROLOGICAL NEGATIVE: 1
PSYCHIATRIC NEGATIVE: 1
CARDIOVASCULAR NEGATIVE: 1
EYES NEGATIVE: 1
NECK STIFFNESS: 0
JOINT SWELLING: 0
GASTROINTESTINAL NEGATIVE: 1
ALLERGIC/IMMUNOLOGIC NEGATIVE: 1
NECK PAIN: 0

## 2025-06-26 ASSESSMENT — PAIN SCALES - GENERAL: PAINLEVEL_OUTOF10: 5

## 2025-06-26 NOTE — PROGRESS NOTES
"History Of Present Illness  Zhanna Perez \"Juan Alberto\" is a pleasant 89 y.o. female who is here for postprocedure follow-up.  Patient is ambulatory with the use of a walker.  Gait is steady.  She arrives with her son.  Patient lives at an assisted [facility].      Patient had right SI RFA on 3/25/2025.  The procedure has improved her back pain.  She reports it provided 80% relief.  She is still feeling better.  The procedure has improved her pain, sleep, and ability to participate in her daily activities.    Patient had left SI joint injection done on 5/13/2025.  The injection has improved her back pain.  She reports it provided 80% relief that lasted for more than a month.  The injection has improved her pain and her ability to participate in her daily activities.    Patient continues to have chronic lower back pain.  She rates her pain as 5-6 out of 10.  Pain is constant.  She describes it as aching, tender and throbbing kind of pain.  Back pain is aggravated with prolonged standing or walking.  She denies pain, numbness or tingling sensation to her legs.  She denies increasingly weakness or change in balance.  She denies bowel or bladder incontinence.    Patient had an ED visit on 5/24/2025 for a fall incident.  She fell at the assisted [facility] on 5/23/2025.  She reports she was trying to use the bathroom and she does not know exactly how it happened but she ended up falling and hurting her left arm.  She reports that she also injured her nose.  She mentioned she was black and blue but is now getting better.  She denies feeling dizzy prior to the fall or passing out after.  She had CT of her head and x-rays to her right hand and wrist and I reviewed the results which were unremarkable.      Patient is only taking Tylenol for pain relief.  She is on duloxetine prescribed by her provider.  She does not want to take other medications.  She continues her Eliquis.  She continues to do her physical " therapy at her home in [facility].     I reviewed previous notes, labs and imaging.  I discussed the plan of care including pharmacologic and joint interventional procedure.  Patient had bilateral L4-L5, L5-S1 RFA on 12/17/2024.  She reports she obtained more than 50% relief for 6 months.  She would like this repeated.  Procedure is done under fluoroscopy.  The RFA is a thermal nonpulsed or thermal continuous ablation. Questions were answered during this encounter.    HEMAL was repeated today which she scored 38.  This form was scanned and included in this note.    The patient was counseled regarding diagnostic results, instructions for management, risk factor reductions, risks and benefits of treatment options and importance of compliance with treatment.    -------------  PROCEDURES:  5/13/2025: Left SI injection  3/25/2025: Right SI RFA  12/17/2024: Bilateral L4-L5, L5-S1 RFA  10/1/2024: Left L4-L5, L5-S1 diagnostic MBB #2  8/23/2024: Left L4-L5, L5-S1 diagnostic MBB #1  6/14/2024: Bilateral L2-L3, L3-L4 RFA  5/14/2024: Bilateral L2-L3, L3-L4 diagnostic MBB #2  4/2/2024: Bilateral L2-L3, L3-L4 diagnostic MBB #1  1/30/2024: Bilateral SI joint injection  12/11/2023: Right L4-L5, L5-S1 RFA  8/25/2023: Right SI joint injection  7/18/2023: Left L5 and S1 TFESI  12/7/2022: Left L3-L4, right L4-L5 TFESI with Dr. Banks  9/16/2022: Right L4-L5, L5-S1 RFA with Dr. Banks  ---------------    OARRS:  Rosaura Ferro, PANDA-CNP, DNP on 6/26/2025 11:07 AM  I have personally reviewed the OARRS report for Zhanna Perez. I have considered the risks of abuse, dependence, addiction and diversion    Past Medical History  She has a past medical history of Age-related cognitive decline (03/31/2015), Body mass index (BMI) 32.0-32.9, adult (10/28/2020), Bursitis of right shoulder (08/21/2018), Encounter for general adult medical examination without abnormal findings (04/01/2016), Encounter for other preprocedural examination  (08/30/2016), Erythematous condition, unspecified (05/09/2018), Hyperlipidemia, Mastitis without abscess (05/09/2018), Mastodynia (05/09/2018), Mastodynia (05/09/2018), Obesity, unspecified (08/02/2021), Obesity, unspecified (10/28/2020), Obesity, unspecified (09/15/2021), Obesity, unspecified (05/03/2021), Occipital neuralgia (05/12/2016), Other conditions influencing health status (01/28/2020), Other conditions influencing health status (04/07/2017), Pain in right shoulder (07/05/2018), Pain in unspecified hip (12/29/2014), Personal history of other diseases of the female genital tract (05/09/2018), Personal history of other diseases of the musculoskeletal system and connective tissue, Personal history of other diseases of the nervous system and sense organs (08/23/2017), Personal history of other diseases of the nervous system and sense organs (03/27/2014), Personal history of other diseases of the respiratory system (06/27/2016), Personal history of other diseases of the respiratory system (06/19/2020), Personal history of other infectious and parasitic diseases (02/25/2016), Trigger finger, right middle finger (03/19/2020), Trochanteric bursitis, left hip (06/03/2019), 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    Surgical History  Past Surgical History:   Procedure Laterality Date    BREAST SURGERY  03/02/2015    Breast Surgery    EYE SURGERY  03/02/2015    Eye Surgery    JOINT REPLACEMENT Right     knee replacement    KNEE ARTHROSCOPY W/ DEBRIDEMENT  04/30/2013    Knee Arthroscopy (Therapeutic)    MR NECK ANGIO WO IV CONTRAST  02/26/2016    MR NECK ANGIO WO IV CONTRAST 2/26/2016 [facility]    OTHER SURGICAL HISTORY  04/30/2013    Supracervical Hysterectomy Laparoscopic Uterus 250g Or Less    TONSILLECTOMY  04/30/2013    Tonsillectomy        Social History  She  reports that she quit smoking about 74 years ago. Her smoking use included cigarettes. She has been exposed to tobacco smoke. She has never used smokeless tobacco. She reports that she does not drink alcohol and does not use drugs.    Family History  Family History   Problem Relation Name Age of Onset    Brain cancer Father      Coronary artery disease Brother          Allergies  Codeine, Darvocet a500 [propoxyphene n-acetaminophen], Dilaudid [hydromorphone], Gabapentin, and Propoxyphene-acetaminophen    Medications    Current Outpatient Medications:     acetaminophen (TYLENOL ORAL), Take by mouth., Disp: , Rfl:     alendronate (Fosamax) 70 mg tablet, Take 1 tablet (70 mg) by mouth every 7 days. IN AM W/ 6-8OZ PLAIN WATER. DO NOT EAT/LIE DOWN FOR 30 MIN AFTER, Disp: 12 tablet, Rfl: 1    ammonium lactate (Lac-Hydrin) 12 % lotion, PLEASE APPLY TO BOTH FEET TWO TIMES PER DAY., Disp: , Rfl:     apixaban (Eliquis) 5 mg tablet, Take 1 tablet (5 mg) by mouth 2 times a day., Disp: 180 tablet, Rfl: 1    blood pressure monitor kit, Use to check BP daily and as needed, Disp: 1 kit, Rfl: 0    blood sugar diagnostic (Premier Test Strip) strip, TEST BLOOD SUGARonce daily. E11.9, Disp: 100 each, Rfl: 1    cholecalciferol (Vitamin D-3) 25 MCG (1000 UT) capsule, Take 1 capsule (25 mcg) by mouth once daily., Disp: 90 capsule, Rfl: 3    DULoxetine (Cymbalta) 30 mg DR capsule, Take 1 capsule (30 mg) by mouth once daily., Disp: 90 capsule, Rfl: 1    ferrous sulfate 325 (65 Fe) MG EC tablet, Take 1 tablet by mouth once daily with breakfast. Do not crush, chew, or split., Disp: 90 tablet, Rfl: 3    fluticasone (Flonase) 50 mcg/actuation nasal spray, Administer 2 sprays into each nostril once daily. Shake gently. Before first use, prime pump. After use, clean tip and replace cap., Disp: 48 mL, Rfl: 1    folic acid (Folvite) 1 mg tablet, Take 1 tablet (1 mg) by mouth once daily., Disp: 90 tablet, Rfl: 1    FreeStyle glucose monitoring kit,  "1 each if needed., Disp: , Rfl:     glimepiride (Amaryl) 2 mg tablet, Take 1 tablet (2 mg) by mouth once daily in the morning. Take before meals., Disp: 90 tablet, Rfl: 1    Lantus Solostar U-100 Insulin 100 unit/mL (3 mL) pen, INJECT 10 UNITS UNDER THE SKIN ONCE DAILY AT BEDTIME. TAKE AS DIRECTED PER INSULIN INSTRUCTIONS., Disp: 9 mL, Rfl: 1    levothyroxine (Synthroid, Levoxyl) 25 mcg tablet, Take 1 tablet (25 mcg) by mouth once daily., Disp: 90 tablet, Rfl: 1    lisinopril 40 mg tablet, TAKE 1 TABLET BY MOUTH EVERY DAY, Disp: 90 tablet, Rfl: 1    loperamide (Imodium) 2 mg capsule, Take 1 capsule (2 mg) by mouth 4 times a day as needed for diarrhea., Disp: , Rfl:     magnesium hydroxide (Milk of Magnesia) 400 mg/5 mL suspension, Take 30 mL by mouth once daily as needed for constipation., Disp: , Rfl:     meclizine (Antivert) 25 mg tablet, Take 1 tablet (25 mg) by mouth 3 times a day as needed for dizziness., Disp: 30 tablet, Rfl: 1    omeprazole (PriLOSEC) 20 mg DR capsule, Take 1 capsule (20 mg) by mouth once daily in the morning. Take before meals. Do not crush or chew., Disp: 90 capsule, Rfl: 1    pen needle, diabetic (BD Ultra-Fine Mini Pen Needle) 31 gauge x 3/16\" needle, INJECT 1 EACH UNDER THE SKIN ONCE DAILY. USE AS INSTRUCTED. USE TO INJECT INSULIN, Disp: 100 each, Rfl: 1    rosuvastatin (Crestor) 20 mg tablet, Take 1 tablet (20 mg) by mouth once daily., Disp: 90 tablet, Rfl: 1    tiZANidine (Zanaflex) 2 mg tablet, TAKE 1 TABLET (2 MG) BY MOUTH 2 TIMES A DAY AS NEEDED FOR MUSCLE SPASMS., Disp: 60 tablet, Rfl: 0    vit C/E/Zn/coppr/lutein/zeaxan (PRESERVISION AREDS-2 ORAL), Take 1 capsule by mouth 2 times a day., Disp: , Rfl:      Review of Systems   Constitutional: Negative.    HENT: Negative.     Eyes: Negative.    Respiratory: Negative.     Cardiovascular: Negative.    Gastrointestinal: Negative.    Endocrine: Negative.    Genitourinary: Negative.    Musculoskeletal:  Positive for arthralgias, back pain " "and myalgias. Negative for gait problem, joint swelling, neck pain and neck stiffness.   Skin: Negative.    Allergic/Immunologic: Negative.    Neurological: Negative.    Psychiatric/Behavioral: Negative.          Physical Exam  Vitals and nursing note reviewed.   HENT:      Head: Normocephalic.      Nose: Nose normal.   Eyes:      Extraocular Movements: Extraocular movements intact.      Conjunctiva/sclera: Conjunctivae normal.      Pupils: Pupils are equal, round, and reactive to light.   Cardiovascular:      Rate and Rhythm: Normal rate and regular rhythm.   Pulmonary:      Effort: Pulmonary effort is normal.      Breath sounds: Normal breath sounds.   Genitourinary:     Comments: Deferred  Musculoskeletal:         General: Tenderness present. No swelling, deformity or signs of injury.      Cervical back: No rigidity or tenderness.      Right lower leg: No edema.      Left lower leg: No edema.      Comments: \"For full disclosure, patient was asked to pull up their garment to properly visualize the spine. This is done by the patient without me touching their garment.  The spine/joints were examined using gloves.\"    Negative leg raise.  Positive for paraspinal tenderness at the lumbar region bilaterally at L4-L5, L5-S1 with rotation.  No radicular symptoms.  Positive for minimal left SI joint pain on palpation.  Positive for tenderness on palpation at the medial and lateral aspect of the right wrist.  With mild swelling.  Positive for Finklestein's test and Phalen's test.  Positive for tenderness on palpation at the right dorsal forearm and at the olecranon.  With mild swelling.  Negative for erythema, bruising, lesions or other skin discoloration.  BLE 4/4.   Skin:     General: Skin is warm and dry.   Neurological:      General: No focal deficit present.      Mental Status: She is alert and oriented to person, place, and time.   Psychiatric:         Mood and Affect: Mood normal.         Behavior: Behavior normal. "          Last Recorded Vitals  BP (!) 188/79 (BP Location: Left arm, Patient Position: Sitting, BP Cuff Size: Adult)   Pulse 81   Temp 36.7 °C (98 °F) (Temporal)   Resp 16   Wt 65.8 kg (145 lb)   SpO2 98%  Body mass index is 27.4 kg/m².       Pain Management Panel  More data exists         Latest Ref Rng & Units 8/29/2024 6/5/2024   Pain Management Panel   Amphetamine Screen, Urine Presumptive Negative Presumptive Negative  Presumptive Negative    Barbiturate Screen, Urine Presumptive Negative Presumptive Negative  Presumptive Negative    Codeine <50 ng/mL <50  <50    Hydromorphone Urine <25 ng/mL <25  <25    Morphine  <50 ng/mL <50  <50           Relevant Results    Narrative & Impression   MRN: [id number]  Patient Name: PADMINI WATT     STUDY:  MRI L-SPINE WO;  7/12/2022 4:18 pm     INDICATION:  Right sided lower back pain. Lumbosacral spondylosis.     COMPARISON:  Lumbar spine radiograph 06/09/2022     ACCESSION NUMBER(S):  [id number]     ORDERING CLINICIAN:  ZACARIAS CROCKER     TECHNIQUE:  Sagittal T1, T2, STIR, axial T1 and T2 weighted images of the lumbar  spine were acquired.     FINDINGS:  Alignment: Mild straightening of the lumbar lordosis. Moderate  levoscoliosis centered at L3-L4.  Grade 1 anterolisthesis of L2 on L3  and L3 on L4.     Vertebrae/Intervertebral Discs: Vertebral body heights are  maintained.The marrow signal is within normal limits. Multilevel loss  of disc spaces most prominent at L2-L3 and L3-L4.     Conus: The lower thoracic cord appears unremarkable. The conus  terminates at L1-L2. Redundancy of the nerve roots at L3-L4 secondary  to severe canal narrowing..     Multilevel degenerative changes of the lumbar spine including  endplate remodeling, facet degenerative changes, disc bulges and  ligamentum flavum hypertrophy.     T12-L1:  Small disc bulge and facet degenerative changes without  canal stenosis. Neural foramina are patent.     L1-2:  Disc bulge slightly asymmetric to the left,  facet degenerative  changes and mild ligamentum flavum hypertrophy with flattening of the  ventral thecal sac. No significant canal stenosis. Neural foramina  are patent.     L2-3:  Symmetric disc bulge, ligamentum flavum hypertrophy and facet  degenerative changes with moderate canal stenosis. Right subarticular  recess narrowing. Right greater than left moderate neural foraminal  narrowing with abutment of the exiting right L2 nerve root.     L3-4:  Disc bulge asymmetric to the right, marked facet degenerative  changes and ligamentum flavum hypertrophy with severe canal stenosis.  Bilateral subarticular recess narrowing with abutment of the  traversing L4 nerve roots. Severe bilateral neural foraminal  narrowing with mild mass effect on the exiting L3 nerve roots.     L4-5:  Disc bulge, facet degenerative changes and ligamentum flavum  hypertrophy with severe canal stenosis. Bilateral subarticular recess  narrowing. Left-greater-than-right severe neural foraminal narrowing  with mass effect on the exiting left L4 nerve root.     L5-S1:  Disc bulge, moderate facet degenerative change and ligamentum  flavum hypertrophy without canal stenosis. Moderate bilateral neural  foraminal narrowing, greater on the left with abutment of the exiting  L5 nerve roots bilaterally.     The prevertebral and posterior paraspinous soft tissues are  unremarkable.  1.3 cm left interpolar region simple cyst.     IMPRESSION:  1.  Advanced multilevel degenerative changes of the lumbar spine with  up to severe canal stenosis and neural foraminal narrowing most  prominent at L3-L4 and L4-L5.  2. Varying degrees of multilevel abutment and mass effect on the  exiting nerve roots as described above.  3. Redundancy of the nerve roots at L3-L4 secondary to severe canal  narrowing and disc bulge.  4. Multilevel subarticular recess narrowing, most prominent at L3-L4  and L4-L5.   --------------        Media Information           Assessment/Plan    Problem List Items Addressed This Visit           ICD-10-CM    Facet arthropathy, lumbosacral - Primary M47.817    Relevant Orders    QUEST MISCELLANEOUS TEST (ROOM TEMPERATURE)    [place] pain management    Radiofrequency Ablation     Other Visit Diagnoses         Codes      Injury due to fall, subsequent encounter     W19.XXXD    Relevant Orders    XR forearm right 2 views (Completed)    XR elbow right 3+ views (Completed)      Pain of right forearm     M79.631    Relevant Orders    XR forearm right 2 views (Completed)      Right elbow pain     M25.521    Relevant Orders    XR elbow right 3+ views (Completed)      Medication management     Z79.899    Relevant Orders    QUEST MISCELLANEOUS TEST (ROOM TEMPERATURE)              Plan/Follow-up Instructions:     Continue with your prescribed medications.    I ordered x-ray to the right forearm and right elbow.  You may get this done anytime.    ---------------    Your next appointment is with Dr. Way in:    [facility]    For pain block/injection on: 7/15/2025, bilateral L4-L5, L5-S1 RFA    LOCAL    Stop Eliquis for 2 days    °You will receive a phone call the weekday before your appointment/procedure.   °Please KEEP your scheduled appointments.     °BRING your photo ID, insurance information, and a correct list of your home medications to EVERY visit.    °Please call our office at [phone] if you have any questions.     You will then be seen for a postprocedure follow-up in 8 to 10 weeks    Antibiotic: You will receive Cipro before the procedure due to your history of right total knee replacement  ---------------      Time     Prep time on date of the patient encounter: 2  Time spent directly with patient/family/caregiver: 30   Documentation time: 2  Total time on date of patient encounter: 34      Rosaura Ferro DNP, APRN, FNP-C    [facility]  Office #: [phone]  Fax # [phone]    ---------------------  Disclaimer: This  note was created using voice recognition software. It was not corrected for typographical or grammatical errors, inadvertent word insertion, or any unintended errors. Please feel free to contact me for clarification.  -----------------

## 2025-06-26 NOTE — PROGRESS NOTES
Last urine drug screening date/ordered today: 8/29/2024     Results of last screen: As expected.  Updated today      Last opioid risk screening date/ordered today: 8/30/2024      Pain Scale Screening:   Pain Assessment and Documentation Tool (PADT)   Date of Assessment: 66/26/2025  Analgesia:   Patient reports her pain level on average during the past week is 6on a 0 - 10 scale.   Patient reports that her pain level at its worst during the past week was 6 on a 0 -10 scale.   50% of pain has been relieved during the past week per patient   Patient states that the amount of pain relief she is now obtaining from her current pain reliever(s) is enough to make a real difference in her life.   Query to clinician: Is the patient's pain relief clinically significant? yes  Activities of Daily Living:   Physical functioning: better  Family relationships: unchanged  Social relationships: unchanged  Mood: unchanged  Sleep patterns: unchanged  Overall functioning: unchanged  Adverse Events: No, Zhanna Perez is not experiencing side effects from current pain reliever.  Patients overall severity of side effect:none  Specific Analgesic Plan: Continue present regimen.

## 2025-06-26 NOTE — H&P (VIEW-ONLY)
"History Of Present Illness  Zhanna Perez \"Juan Alberto\" is a pleasant 89 y.o. female who is here for postprocedure follow-up.  Patient is ambulatory with the use of a walker.  Gait is steady.  She arrives with her son.  Patient lives at an assisted [facility].      Patient had right SI RFA on 3/25/2025.  The procedure has improved her back pain.  She reports it provided 80% relief.  She is still feeling better.  The procedure has improved her pain, sleep, and ability to participate in her daily activities.    Patient had left SI joint injection done on 5/13/2025.  The injection has improved her back pain.  She reports it provided 80% relief that lasted for more than a month.  The injection has improved her pain and her ability to participate in her daily activities.    Patient continues to have chronic lower back pain.  She rates her pain as 5-6 out of 10.  Pain is constant.  She describes it as aching, tender and throbbing kind of pain.  Back pain is aggravated with prolonged standing or walking.  She denies pain, numbness or tingling sensation to her legs.  She denies increasingly weakness or change in balance.  She denies bowel or bladder incontinence.    Patient had an ED visit on 5/24/2025 for a fall incident.  She fell at the assisted [facility] on 5/23/2025.  She reports she was trying to use the bathroom and she does not know exactly how it happened but she ended up falling and hurting her left arm.  She reports that she also injured her nose.  She mentioned she was black and blue but is now getting better.  She denies feeling dizzy prior to the fall or passing out after.  She had CT of her head and x-rays to her right hand and wrist and I reviewed the results which were unremarkable.      Patient is only taking Tylenol for pain relief.  She is on duloxetine prescribed by her provider.  She does not want to take other medications.  She continues her Eliquis.  She continues to do her physical " therapy at her home in [facility].     I reviewed previous notes, labs and imaging.  I discussed the plan of care including pharmacologic and joint interventional procedure.  Patient had bilateral L4-L5, L5-S1 RFA on 12/17/2024.  She reports she obtained more than 50% relief for 6 months.  She would like this repeated.  Procedure is done under fluoroscopy.  The RFA is a thermal nonpulsed or thermal continuous ablation. Questions were answered during this encounter.    HEMAL was repeated today which she scored 38.  This form was scanned and included in this note.    The patient was counseled regarding diagnostic results, instructions for management, risk factor reductions, risks and benefits of treatment options and importance of compliance with treatment.    -------------  PROCEDURES:  5/13/2025: Left SI injection  3/25/2025: Right SI RFA  12/17/2024: Bilateral L4-L5, L5-S1 RFA  10/1/2024: Left L4-L5, L5-S1 diagnostic MBB #2  8/23/2024: Left L4-L5, L5-S1 diagnostic MBB #1  6/14/2024: Bilateral L2-L3, L3-L4 RFA  5/14/2024: Bilateral L2-L3, L3-L4 diagnostic MBB #2  4/2/2024: Bilateral L2-L3, L3-L4 diagnostic MBB #1  1/30/2024: Bilateral SI joint injection  12/11/2023: Right L4-L5, L5-S1 RFA  8/25/2023: Right SI joint injection  7/18/2023: Left L5 and S1 TFESI  12/7/2022: Left L3-L4, right L4-L5 TFESI with Dr. Banks  9/16/2022: Right L4-L5, L5-S1 RFA with Dr. Banks  ---------------    OARRS:  Rosaura Ferro, PANDA-CNP, DNP on 6/26/2025 11:07 AM  I have personally reviewed the OARRS report for Zhanna Perez. I have considered the risks of abuse, dependence, addiction and diversion    Past Medical History  She has a past medical history of Age-related cognitive decline (03/31/2015), Body mass index (BMI) 32.0-32.9, adult (10/28/2020), Bursitis of right shoulder (08/21/2018), Encounter for general adult medical examination without abnormal findings (04/01/2016), Encounter for other preprocedural examination  (08/30/2016), Erythematous condition, unspecified (05/09/2018), Hyperlipidemia, Mastitis without abscess (05/09/2018), Mastodynia (05/09/2018), Mastodynia (05/09/2018), Obesity, unspecified (08/02/2021), Obesity, unspecified (10/28/2020), Obesity, unspecified (09/15/2021), Obesity, unspecified (05/03/2021), Occipital neuralgia (05/12/2016), Other conditions influencing health status (01/28/2020), Other conditions influencing health status (04/07/2017), Pain in right shoulder (07/05/2018), Pain in unspecified hip (12/29/2014), Personal history of other diseases of the female genital tract (05/09/2018), Personal history of other diseases of the musculoskeletal system and connective tissue, Personal history of other diseases of the nervous system and sense organs (08/23/2017), Personal history of other diseases of the nervous system and sense organs (03/27/2014), Personal history of other diseases of the respiratory system (06/27/2016), Personal history of other diseases of the respiratory system (06/19/2020), Personal history of other infectious and parasitic diseases (02/25/2016), Trigger finger, right middle finger (03/19/2020), Trochanteric bursitis, left hip (06/03/2019), 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    Surgical History  Past Surgical History:   Procedure Laterality Date    BREAST SURGERY  03/02/2015    Breast Surgery    EYE SURGERY  03/02/2015    Eye Surgery    JOINT REPLACEMENT Right     knee replacement    KNEE ARTHROSCOPY W/ DEBRIDEMENT  04/30/2013    Knee Arthroscopy (Therapeutic)    MR NECK ANGIO WO IV CONTRAST  02/26/2016    MR NECK ANGIO WO IV CONTRAST 2/26/2016 [facility]    OTHER SURGICAL HISTORY  04/30/2013    Supracervical Hysterectomy Laparoscopic Uterus 250g Or Less    TONSILLECTOMY  04/30/2013    Tonsillectomy        Social History  She  reports that she quit smoking about 74 years ago. Her smoking use included cigarettes. She has been exposed to tobacco smoke. She has never used smokeless tobacco. She reports that she does not drink alcohol and does not use drugs.    Family History  Family History   Problem Relation Name Age of Onset    Brain cancer Father      Coronary artery disease Brother          Allergies  Codeine, Darvocet a500 [propoxyphene n-acetaminophen], Dilaudid [hydromorphone], Gabapentin, and Propoxyphene-acetaminophen    Medications    Current Outpatient Medications:     acetaminophen (TYLENOL ORAL), Take by mouth., Disp: , Rfl:     alendronate (Fosamax) 70 mg tablet, Take 1 tablet (70 mg) by mouth every 7 days. IN AM W/ 6-8OZ PLAIN WATER. DO NOT EAT/LIE DOWN FOR 30 MIN AFTER, Disp: 12 tablet, Rfl: 1    ammonium lactate (Lac-Hydrin) 12 % lotion, PLEASE APPLY TO BOTH FEET TWO TIMES PER DAY., Disp: , Rfl:     apixaban (Eliquis) 5 mg tablet, Take 1 tablet (5 mg) by mouth 2 times a day., Disp: 180 tablet, Rfl: 1    blood pressure monitor kit, Use to check BP daily and as needed, Disp: 1 kit, Rfl: 0    blood sugar diagnostic (Premier Test Strip) strip, TEST BLOOD SUGARonce daily. E11.9, Disp: 100 each, Rfl: 1    cholecalciferol (Vitamin D-3) 25 MCG (1000 UT) capsule, Take 1 capsule (25 mcg) by mouth once daily., Disp: 90 capsule, Rfl: 3    DULoxetine (Cymbalta) 30 mg DR capsule, Take 1 capsule (30 mg) by mouth once daily., Disp: 90 capsule, Rfl: 1    ferrous sulfate 325 (65 Fe) MG EC tablet, Take 1 tablet by mouth once daily with breakfast. Do not crush, chew, or split., Disp: 90 tablet, Rfl: 3    fluticasone (Flonase) 50 mcg/actuation nasal spray, Administer 2 sprays into each nostril once daily. Shake gently. Before first use, prime pump. After use, clean tip and replace cap., Disp: 48 mL, Rfl: 1    folic acid (Folvite) 1 mg tablet, Take 1 tablet (1 mg) by mouth once daily., Disp: 90 tablet, Rfl: 1    FreeStyle glucose monitoring kit,  "1 each if needed., Disp: , Rfl:     glimepiride (Amaryl) 2 mg tablet, Take 1 tablet (2 mg) by mouth once daily in the morning. Take before meals., Disp: 90 tablet, Rfl: 1    Lantus Solostar U-100 Insulin 100 unit/mL (3 mL) pen, INJECT 10 UNITS UNDER THE SKIN ONCE DAILY AT BEDTIME. TAKE AS DIRECTED PER INSULIN INSTRUCTIONS., Disp: 9 mL, Rfl: 1    levothyroxine (Synthroid, Levoxyl) 25 mcg tablet, Take 1 tablet (25 mcg) by mouth once daily., Disp: 90 tablet, Rfl: 1    lisinopril 40 mg tablet, TAKE 1 TABLET BY MOUTH EVERY DAY, Disp: 90 tablet, Rfl: 1    loperamide (Imodium) 2 mg capsule, Take 1 capsule (2 mg) by mouth 4 times a day as needed for diarrhea., Disp: , Rfl:     magnesium hydroxide (Milk of Magnesia) 400 mg/5 mL suspension, Take 30 mL by mouth once daily as needed for constipation., Disp: , Rfl:     meclizine (Antivert) 25 mg tablet, Take 1 tablet (25 mg) by mouth 3 times a day as needed for dizziness., Disp: 30 tablet, Rfl: 1    omeprazole (PriLOSEC) 20 mg DR capsule, Take 1 capsule (20 mg) by mouth once daily in the morning. Take before meals. Do not crush or chew., Disp: 90 capsule, Rfl: 1    pen needle, diabetic (BD Ultra-Fine Mini Pen Needle) 31 gauge x 3/16\" needle, INJECT 1 EACH UNDER THE SKIN ONCE DAILY. USE AS INSTRUCTED. USE TO INJECT INSULIN, Disp: 100 each, Rfl: 1    rosuvastatin (Crestor) 20 mg tablet, Take 1 tablet (20 mg) by mouth once daily., Disp: 90 tablet, Rfl: 1    tiZANidine (Zanaflex) 2 mg tablet, TAKE 1 TABLET (2 MG) BY MOUTH 2 TIMES A DAY AS NEEDED FOR MUSCLE SPASMS., Disp: 60 tablet, Rfl: 0    vit C/E/Zn/coppr/lutein/zeaxan (PRESERVISION AREDS-2 ORAL), Take 1 capsule by mouth 2 times a day., Disp: , Rfl:      Review of Systems   Constitutional: Negative.    HENT: Negative.     Eyes: Negative.    Respiratory: Negative.     Cardiovascular: Negative.    Gastrointestinal: Negative.    Endocrine: Negative.    Genitourinary: Negative.    Musculoskeletal:  Positive for arthralgias, back pain " "and myalgias. Negative for gait problem, joint swelling, neck pain and neck stiffness.   Skin: Negative.    Allergic/Immunologic: Negative.    Neurological: Negative.    Psychiatric/Behavioral: Negative.          Physical Exam  Vitals and nursing note reviewed.   HENT:      Head: Normocephalic.      Nose: Nose normal.   Eyes:      Extraocular Movements: Extraocular movements intact.      Conjunctiva/sclera: Conjunctivae normal.      Pupils: Pupils are equal, round, and reactive to light.   Cardiovascular:      Rate and Rhythm: Normal rate and regular rhythm.   Pulmonary:      Effort: Pulmonary effort is normal.      Breath sounds: Normal breath sounds.   Genitourinary:     Comments: Deferred  Musculoskeletal:         General: Tenderness present. No swelling, deformity or signs of injury.      Cervical back: No rigidity or tenderness.      Right lower leg: No edema.      Left lower leg: No edema.      Comments: \"For full disclosure, patient was asked to pull up their garment to properly visualize the spine. This is done by the patient without me touching their garment.  The spine/joints were examined using gloves.\"    Negative leg raise.  Positive for paraspinal tenderness at the lumbar region bilaterally at L4-L5, L5-S1 with rotation.  No radicular symptoms.  Positive for minimal left SI joint pain on palpation.  Positive for tenderness on palpation at the medial and lateral aspect of the right wrist.  With mild swelling.  Positive for Finklestein's test and Phalen's test.  Positive for tenderness on palpation at the right dorsal forearm and at the olecranon.  With mild swelling.  Negative for erythema, bruising, lesions or other skin discoloration.  BLE 4/4.   Skin:     General: Skin is warm and dry.   Neurological:      General: No focal deficit present.      Mental Status: She is alert and oriented to person, place, and time.   Psychiatric:         Mood and Affect: Mood normal.         Behavior: Behavior normal. "          Last Recorded Vitals  BP (!) 188/79 (BP Location: Left arm, Patient Position: Sitting, BP Cuff Size: Adult)   Pulse 81   Temp 36.7 °C (98 °F) (Temporal)   Resp 16   Wt 65.8 kg (145 lb)   SpO2 98%  Body mass index is 27.4 kg/m².       Pain Management Panel  More data exists         Latest Ref Rng & Units 8/29/2024 6/5/2024   Pain Management Panel   Amphetamine Screen, Urine Presumptive Negative Presumptive Negative  Presumptive Negative    Barbiturate Screen, Urine Presumptive Negative Presumptive Negative  Presumptive Negative    Codeine <50 ng/mL <50  <50    Hydromorphone Urine <25 ng/mL <25  <25    Morphine  <50 ng/mL <50  <50           Relevant Results    Narrative & Impression   MRN: [id number]  Patient Name: PADMINI WATT     STUDY:  MRI L-SPINE WO;  7/12/2022 4:18 pm     INDICATION:  Right sided lower back pain. Lumbosacral spondylosis.     COMPARISON:  Lumbar spine radiograph 06/09/2022     ACCESSION NUMBER(S):  [id number]     ORDERING CLINICIAN:  ZACARIAS CROCKER     TECHNIQUE:  Sagittal T1, T2, STIR, axial T1 and T2 weighted images of the lumbar  spine were acquired.     FINDINGS:  Alignment: Mild straightening of the lumbar lordosis. Moderate  levoscoliosis centered at L3-L4.  Grade 1 anterolisthesis of L2 on L3  and L3 on L4.     Vertebrae/Intervertebral Discs: Vertebral body heights are  maintained.The marrow signal is within normal limits. Multilevel loss  of disc spaces most prominent at L2-L3 and L3-L4.     Conus: The lower thoracic cord appears unremarkable. The conus  terminates at L1-L2. Redundancy of the nerve roots at L3-L4 secondary  to severe canal narrowing..     Multilevel degenerative changes of the lumbar spine including  endplate remodeling, facet degenerative changes, disc bulges and  ligamentum flavum hypertrophy.     T12-L1:  Small disc bulge and facet degenerative changes without  canal stenosis. Neural foramina are patent.     L1-2:  Disc bulge slightly asymmetric to the left,  facet degenerative  changes and mild ligamentum flavum hypertrophy with flattening of the  ventral thecal sac. No significant canal stenosis. Neural foramina  are patent.     L2-3:  Symmetric disc bulge, ligamentum flavum hypertrophy and facet  degenerative changes with moderate canal stenosis. Right subarticular  recess narrowing. Right greater than left moderate neural foraminal  narrowing with abutment of the exiting right L2 nerve root.     L3-4:  Disc bulge asymmetric to the right, marked facet degenerative  changes and ligamentum flavum hypertrophy with severe canal stenosis.  Bilateral subarticular recess narrowing with abutment of the  traversing L4 nerve roots. Severe bilateral neural foraminal  narrowing with mild mass effect on the exiting L3 nerve roots.     L4-5:  Disc bulge, facet degenerative changes and ligamentum flavum  hypertrophy with severe canal stenosis. Bilateral subarticular recess  narrowing. Left-greater-than-right severe neural foraminal narrowing  with mass effect on the exiting left L4 nerve root.     L5-S1:  Disc bulge, moderate facet degenerative change and ligamentum  flavum hypertrophy without canal stenosis. Moderate bilateral neural  foraminal narrowing, greater on the left with abutment of the exiting  L5 nerve roots bilaterally.     The prevertebral and posterior paraspinous soft tissues are  unremarkable.  1.3 cm left interpolar region simple cyst.     IMPRESSION:  1.  Advanced multilevel degenerative changes of the lumbar spine with  up to severe canal stenosis and neural foraminal narrowing most  prominent at L3-L4 and L4-L5.  2. Varying degrees of multilevel abutment and mass effect on the  exiting nerve roots as described above.  3. Redundancy of the nerve roots at L3-L4 secondary to severe canal  narrowing and disc bulge.  4. Multilevel subarticular recess narrowing, most prominent at L3-L4  and L4-L5.   --------------        Media Information           Assessment/Plan    Problem List Items Addressed This Visit           ICD-10-CM    Facet arthropathy, lumbosacral - Primary M47.817    Relevant Orders    [facility] MISCELLANEOUS TEST (ROOM TEMPERATURE)    [place] pain management    Radiofrequency Ablation     Other Visit Diagnoses         Codes      Injury due to fall, subsequent encounter     W19.XXXD    Relevant Orders    XR forearm right 2 views (Completed)    XR elbow right 3+ views (Completed)      Pain of right forearm     M79.631    Relevant Orders    XR forearm right 2 views (Completed)      Right elbow pain     M25.521    Relevant Orders    XR elbow right 3+ views (Completed)      Medication management     Z79.899    Relevant Orders    QUEST MISCELLANEOUS TEST (ROOM TEMPERATURE)              Plan/Follow-up Instructions:     Continue with your prescribed medications.    I ordered x-ray to the right forearm and right elbow.  You may get this done anytime.    ---------------    Your next appointment is with Dr. Way in:    [facility]    For pain block/injection on: 7/15/2025, bilateral L4-L5, L5-S1 RFA    LOCAL    Stop Eliquis for 2 days    °You will receive a phone call the weekday before your appointment/procedure.   °Please KEEP your scheduled appointments.     °BRING your photo ID, insurance information, and a correct list of your home medications to EVERY visit.    °Please call our office at [phone] if you have any questions.     You will then be seen for a postprocedure follow-up in 8 to 10 weeks    Antibiotic: You will receive Cipro before the procedure due to your history of right total knee replacement  ---------------      Time     Prep time on date of the patient encounter: 2  Time spent directly with patient/family/caregiver: 30   Documentation time: 2  Total time on date of patient encounter: 34      Rosaura Ferro DNP, APRN, FNP-C    [facility]  Office #: [phone]  Fax # [phone]    ---------------------  Disclaimer: This  note was created using voice recognition software. It was not corrected for typographical or grammatical errors, inadvertent word insertion, or any unintended errors. Please feel free to contact me for clarification.  -----------------

## 2025-06-26 NOTE — PATIENT INSTRUCTIONS
Continue with your prescribed medications.    I ordered x-ray to the right forearm and right elbow.  You may get this done anytime.    ---------------    Your next appointment is with Dr. Way in:    [facility]    For pain block/injection on: 7/15/2025, bilateral L4-L5, L5-S1 RFA    LOCAL    Stop Eliquis for 2 days    °You will receive a phone call the weekday before your appointment/procedure.   °Please KEEP your scheduled appointments.     °BRING your photo ID, insurance information, and a correct list of your home medications to EVERY visit.    °Please call our office at [phone] if you have any questions.     You will then be seen for a postprocedure follow-up in 8 to 10 weeks    Antibiotic: You will receive Cipro before the procedure due to your history of right total knee replacement  ---------------

## 2025-06-30 LAB — QUEST FLEXITEST1 RESULTS:: NORMAL

## 2025-07-15 ENCOUNTER — HOSPITAL ENCOUNTER (OUTPATIENT)
Dept: PAIN MEDICINE | Facility: HOSPITAL | Age: OVER 89
Discharge: HOME | End: 2025-07-15
Payer: MEDICARE

## 2025-07-15 VITALS
DIASTOLIC BLOOD PRESSURE: 66 MMHG | TEMPERATURE: 97.5 F | HEIGHT: 58 IN | SYSTOLIC BLOOD PRESSURE: 147 MMHG | HEART RATE: 75 BPM | BODY MASS INDEX: 31.49 KG/M2 | RESPIRATION RATE: 16 BRPM | OXYGEN SATURATION: 100 % | WEIGHT: 150 LBS

## 2025-07-15 DIAGNOSIS — M47.817 FACET ARTHROPATHY, LUMBOSACRAL: ICD-10-CM

## 2025-07-15 PROCEDURE — 64636 DESTROY L/S FACET JNT ADDL: CPT | Mod: 50 | Performed by: ANESTHESIOLOGY

## 2025-07-15 PROCEDURE — 2720000007 HC OR 272 NO HCPCS

## 2025-07-15 PROCEDURE — 64635 DESTROY LUMB/SAC FACET JNT: CPT | Mod: 50 | Performed by: ANESTHESIOLOGY

## 2025-07-15 PROCEDURE — 2500000001 HC RX 250 WO HCPCS SELF ADMINISTERED DRUGS (ALT 637 FOR MEDICARE OP): Performed by: NURSE PRACTITIONER

## 2025-07-15 PROCEDURE — 2500000004 HC RX 250 GENERAL PHARMACY W/ HCPCS (ALT 636 FOR OP/ED)

## 2025-07-15 PROCEDURE — 7100000009 HC PHASE TWO TIME - INITIAL BASE CHARGE

## 2025-07-15 PROCEDURE — 7100000010 HC PHASE TWO TIME - EACH INCREMENTAL 1 MINUTE

## 2025-07-15 PROCEDURE — 64635 DESTROY LUMB/SAC FACET JNT: CPT | Performed by: ANESTHESIOLOGY

## 2025-07-15 PROCEDURE — 64636 DESTROY L/S FACET JNT ADDL: CPT | Performed by: ANESTHESIOLOGY

## 2025-07-15 PROCEDURE — 2500000004 HC RX 250 GENERAL PHARMACY W/ HCPCS (ALT 636 FOR OP/ED): Performed by: ANESTHESIOLOGY

## 2025-07-15 RX ORDER — HYDRALAZINE HYDROCHLORIDE 20 MG/ML
10 INJECTION INTRAMUSCULAR; INTRAVENOUS ONCE
Status: COMPLETED | OUTPATIENT
Start: 2025-07-15 | End: 2025-07-15

## 2025-07-15 RX ORDER — ONDANSETRON HYDROCHLORIDE 2 MG/ML
INJECTION, SOLUTION INTRAVENOUS
Status: COMPLETED
Start: 2025-07-15 | End: 2025-07-15

## 2025-07-15 RX ORDER — BUPIVACAINE HYDROCHLORIDE 2.5 MG/ML
INJECTION, SOLUTION EPIDURAL; INFILTRATION; INTRACAUDAL; PERINEURAL AS NEEDED
Status: COMPLETED | OUTPATIENT
Start: 2025-07-15 | End: 2025-07-15

## 2025-07-15 RX ORDER — LIDOCAINE HYDROCHLORIDE 10 MG/ML
INJECTION, SOLUTION EPIDURAL; INFILTRATION; INTRACAUDAL; PERINEURAL AS NEEDED
Status: COMPLETED | OUTPATIENT
Start: 2025-07-15 | End: 2025-07-15

## 2025-07-15 RX ORDER — LIDOCAINE HYDROCHLORIDE 20 MG/ML
INJECTION, SOLUTION EPIDURAL; INFILTRATION; INTRACAUDAL; PERINEURAL AS NEEDED
Status: COMPLETED | OUTPATIENT
Start: 2025-07-15 | End: 2025-07-15

## 2025-07-15 RX ORDER — AZITHROMYCIN 250 MG/1
TABLET, FILM COATED ORAL
COMMUNITY
Start: 2025-04-09 | End: 2025-07-15 | Stop reason: ALTCHOICE

## 2025-07-15 RX ORDER — CIPROFLOXACIN 500 MG/1
TABLET, FILM COATED ORAL
Status: DISPENSED
Start: 2025-07-15 | End: 2025-07-15

## 2025-07-15 RX ORDER — HYDROXYZINE PAMOATE 25 MG/1
CAPSULE ORAL
COMMUNITY
Start: 2025-04-09

## 2025-07-15 RX ORDER — ONDANSETRON HYDROCHLORIDE 2 MG/ML
4 INJECTION, SOLUTION INTRAVENOUS ONCE
Status: DISCONTINUED | OUTPATIENT
Start: 2025-07-15 | End: 2025-07-16 | Stop reason: HOSPADM

## 2025-07-15 RX ORDER — METHYLPREDNISOLONE ACETATE 40 MG/ML
INJECTION, SUSPENSION INTRA-ARTICULAR; INTRALESIONAL; INTRAMUSCULAR; SOFT TISSUE AS NEEDED
Status: COMPLETED | OUTPATIENT
Start: 2025-07-15 | End: 2025-07-15

## 2025-07-15 RX ORDER — CIPROFLOXACIN 500 MG/1
500 TABLET, FILM COATED ORAL ONCE
Status: COMPLETED | OUTPATIENT
Start: 2025-07-15 | End: 2025-07-15

## 2025-07-15 RX ORDER — HYDRALAZINE HYDROCHLORIDE 20 MG/ML
INJECTION INTRAMUSCULAR; INTRAVENOUS
Status: DISCONTINUED
Start: 2025-07-15 | End: 2025-07-16 | Stop reason: HOSPADM

## 2025-07-15 RX ADMIN — CIPROFLOXACIN 500 MG: 500 TABLET ORAL at 10:46

## 2025-07-15 RX ADMIN — BUPIVACAINE HYDROCHLORIDE 10 ML: 2.5 INJECTION, SOLUTION EPIDURAL; INFILTRATION; INTRACAUDAL; PERINEURAL at 11:45

## 2025-07-15 RX ADMIN — LIDOCAINE HYDROCHLORIDE 10 ML: 20 INJECTION, SOLUTION EPIDURAL; INFILTRATION; INTRACAUDAL; PERINEURAL at 11:42

## 2025-07-15 RX ADMIN — METHYLPREDNISOLONE ACETATE 80 MG: 40 INJECTION, SUSPENSION INTRA-ARTICULAR; INTRALESIONAL; INTRAMUSCULAR; SOFT TISSUE at 11:47

## 2025-07-15 RX ADMIN — HYDRALAZINE HYDROCHLORIDE 10 MG: 20 INJECTION INTRAMUSCULAR; INTRAVENOUS at 12:35

## 2025-07-15 RX ADMIN — ONDANSETRON 4 MG: 2 INJECTION INTRAMUSCULAR; INTRAVENOUS at 12:12

## 2025-07-15 RX ADMIN — LIDOCAINE HYDROCHLORIDE 20 ML: 10 INJECTION, SOLUTION EPIDURAL; INFILTRATION; INTRACAUDAL; PERINEURAL at 11:41

## 2025-07-15 ASSESSMENT — PAIN SCALES - GENERAL
PAINLEVEL_OUTOF10: 7
PAINLEVEL_OUTOF10: 0 - NO PAIN

## 2025-07-15 ASSESSMENT — PAIN - FUNCTIONAL ASSESSMENT
PAIN_FUNCTIONAL_ASSESSMENT: 0-10

## 2025-07-15 ASSESSMENT — PAIN DESCRIPTION - DESCRIPTORS: DESCRIPTORS: ACHING

## 2025-07-15 NOTE — POST-PROCEDURE NOTE
PACU received Ducky from the procedure room actively vomiting some gastric juices sitting in a wheelchair. About 100 mls of greenish yellow fluid. She did not have any sedation. She drank a cup of coffee earlier this morning. Her BP was reaching upwards of 229/111. Stated her head felt heavy. A quick neuro assessment was negative for any focal deficits. She was moved to a bed. Given 4 mg of zofran and 10 mg of hydralazine. After about 25 minutes BP has decreased to 153/68. Arrived at the outpatient facility at a BP of 156/71. She is no longer nauseous. Son, Damián, is at the bedside. Everything was discussed with Dr. Way. She is okay to discharge. Encouraged to go to the Emergency Department with any changes in vision, intractable headache and dizziness, CP, etc.

## 2025-07-15 NOTE — DISCHARGE INSTRUCTIONS
Discharge Instructions:   ° Keep Band-Aid on for the next 24 hours.    ° No showering/bathing for the next 24 hours.    ° You may notice soreness or increased pain in the area of your injection, which may continue for the first 48 hours.    ° Avoid driving or operating any heavy machinery until the next day after the procedure.  ° You should resume any medications and your regular diet after the procedure.  ° You may resume regular daily activity but should avoid strenuous activity the day of the procedure.  Some of the side affects you may experience from the steroids are:  ° Insomnia (inability to sleep)  ° Increased sweating  ° Headaches  ° Increased fluid retention (swelling of your extremities)  ° Increase appetite  ° Face flushing  ° If you are a diabetic, your blood sugars may go up.  Closely monitor your diet.  Your blood sugar should return to normal in a few days.  Complications:   ° Complications are rare with the most common being temporary increase pain near the injection site. You can apply ice to affected area on the day of the procedure.   ° If the discomfort persists, apply moist heat to the area. Serious complications are very uncommon but may include bleeding, infection or nerve damage.   ° If severe pain, fever, redness or swelling near the injection site, have someone take you to the nearest emergency room to be evaluated for procedure complications or infection.  Expectations:   ° Local anesthetics wear off in several hours but duration of relief varies from individual to individual.     If you have any questions, please call the office at [phone].    If this is an emergency, call 911 or go to your nearest hospital.

## 2025-07-15 NOTE — INTERVAL H&P NOTE
H&P reviewed. The patient was examined and there are no changes to the H&P.    No significant findings; reviewed medications and allergies; patient seen and discussed with attending physician responsible for performing the procedure.

## 2025-09-08 ENCOUNTER — APPOINTMENT (OUTPATIENT)
Dept: PAIN MEDICINE | Facility: HOSPITAL | Age: OVER 89
End: 2025-09-08
Payer: MEDICARE

## 2025-09-15 ENCOUNTER — APPOINTMENT (OUTPATIENT)
Dept: ORTHOPEDIC SURGERY | Facility: CLINIC | Age: OVER 89
End: 2025-09-15
Payer: MEDICARE